# Patient Record
Sex: FEMALE | Race: WHITE | Employment: OTHER | URBAN - NONMETROPOLITAN AREA
[De-identification: names, ages, dates, MRNs, and addresses within clinical notes are randomized per-mention and may not be internally consistent; named-entity substitution may affect disease eponyms.]

---

## 2018-02-19 ENCOUNTER — HOSPITAL ENCOUNTER (OUTPATIENT)
Dept: WOMENS IMAGING | Age: 63
Discharge: HOME | End: 2018-02-19
Payer: COMMERCIAL

## 2018-02-19 DIAGNOSIS — Z13.9 VISIT FOR SCREENING: ICD-10-CM

## 2018-02-19 PROCEDURE — 77063 BREAST TOMOSYNTHESIS BI: CPT

## 2019-03-08 ENCOUNTER — HOSPITAL ENCOUNTER (OUTPATIENT)
Dept: WOMENS IMAGING | Age: 64
Discharge: HOME | End: 2019-03-08
Payer: COMMERCIAL

## 2019-03-08 DIAGNOSIS — Z13.9 VISIT FOR SCREENING: ICD-10-CM

## 2019-03-08 PROCEDURE — 77063 BREAST TOMOSYNTHESIS BI: CPT

## 2020-03-16 ENCOUNTER — HOSPITAL ENCOUNTER (OUTPATIENT)
Dept: WOMENS IMAGING | Age: 65
Discharge: HOME | End: 2020-03-16
Payer: MEDICARE

## 2020-03-16 PROCEDURE — 77063 BREAST TOMOSYNTHESIS BI: CPT

## 2020-06-19 ENCOUNTER — OFFICE VISIT (OUTPATIENT)
Dept: FAMILY MEDICINE CLINIC | Age: 65
End: 2020-06-19
Payer: MEDICARE

## 2020-06-19 VITALS
HEART RATE: 80 BPM | WEIGHT: 108 LBS | DIASTOLIC BLOOD PRESSURE: 60 MMHG | RESPIRATION RATE: 20 BRPM | HEIGHT: 64 IN | SYSTOLIC BLOOD PRESSURE: 138 MMHG | TEMPERATURE: 98.5 F | BODY MASS INDEX: 18.44 KG/M2

## 2020-06-19 PROBLEM — Z87.891 HISTORY OF SMOKING AT LEAST 1 PACK PER DAY FOR AT LEAST 30 YEARS: Status: ACTIVE | Noted: 2020-06-19

## 2020-06-19 PROBLEM — Z80.3 FAMILY HISTORY OF BREAST CANCER IN SISTER: Status: ACTIVE | Noted: 2020-06-19

## 2020-06-19 PROCEDURE — 99204 OFFICE O/P NEW MOD 45 MIN: CPT | Performed by: NURSE PRACTITIONER

## 2020-06-19 RX ORDER — MULTIVITAMIN
TABLET ORAL DAILY
COMMUNITY

## 2020-06-19 RX ORDER — BIOTIN 1 MG
TABLET ORAL DAILY
COMMUNITY

## 2020-06-19 SDOH — HEALTH STABILITY: MENTAL HEALTH: HOW MANY STANDARD DRINKS CONTAINING ALCOHOL DO YOU HAVE ON A TYPICAL DAY?: 3 OR 4

## 2020-06-19 SDOH — HEALTH STABILITY: MENTAL HEALTH: HOW OFTEN DO YOU HAVE A DRINK CONTAINING ALCOHOL?: 4 OR MORE TIMES A WEEK

## 2020-06-19 ASSESSMENT — ENCOUNTER SYMPTOMS
CONSTIPATION: 0
EYE PAIN: 0
VOMITING: 0
RHINORRHEA: 0
COUGH: 0
ABDOMINAL PAIN: 0
ALLERGIC/IMMUNOLOGIC NEGATIVE: 1
BACK PAIN: 0
EYE REDNESS: 0
NAUSEA: 0
SHORTNESS OF BREATH: 0
SORE THROAT: 0
DIARRHEA: 0
EYE DISCHARGE: 0
WHEEZING: 0
TROUBLE SWALLOWING: 0

## 2020-06-19 ASSESSMENT — PATIENT HEALTH QUESTIONNAIRE - PHQ9
2. FEELING DOWN, DEPRESSED OR HOPELESS: 0
1. LITTLE INTEREST OR PLEASURE IN DOING THINGS: 0
SUM OF ALL RESPONSES TO PHQ9 QUESTIONS 1 & 2: 0
SUM OF ALL RESPONSES TO PHQ QUESTIONS 1-9: 0
SUM OF ALL RESPONSES TO PHQ QUESTIONS 1-9: 0

## 2020-06-19 NOTE — PROGRESS NOTES
 DTaP/Tdap/Td vaccine (1 - Tdap) 03/30/1973    Lipid screen  03/30/1994    Shingles Vaccine (1 of 2) 03/30/2004    Colon cancer screen colonoscopy  03/30/2004    DEXA (modify frequency per FRAX score)  03/30/2009    Pneumococcal 65+ years Vaccine (1 of 1 - PPSV23) 03/30/2019    Annual Wellness Visit (AWV)  02/16/2020    Flu vaccine (Season Ended) 09/01/2020    Breast cancer screen  03/16/2021    Hepatitis A vaccine  Aged Out    Hepatitis B vaccine  Aged Out    Hib vaccine  Aged Out    Meningococcal (ACWY) vaccine  Aged Out       Future Appointments   Date Time Provider Yvonne Dailey   8/20/2020 10:40 AM NATALY Moe CNP [facility] [address]      Exam is fairly normal, but I did note what may be  systolic and diastolic murmurs. She states she has had a murmur since she was a child but never been evaluated. Diagnosis Orders   1. Wellness examination  CBC With Auto Differential    Basic Metabolic Panel    Lipid Panel    Hepatic Function Panel    Vitamin B12 & Folate    Hepatitis C Antibody   2. History of smoking at least 1 pack per day for at least 30 years     3. Screening for colon cancer  Cologuard (For External Results Only)   4. Family history of breast cancer in sister     11. Vaginal discharge  Culture, Genital       PLAN      1. Patient self swabbed for vaginal culture. 2.  Wellness labs ordered. 3.  Cologuard prescribed. 4.  Patient will inquire about vaccine payment in the office. 5.  Follow-up in 2 months.   Electronically signed by NATALY Moe CNP on 6/19/2020 at 12:04 PM

## 2020-06-19 NOTE — PATIENT INSTRUCTIONS
Patient Education        Stopping Smoking: Care Instructions  Your Care Instructions     Cigarette smokers crave the nicotine in cigarettes. Giving it up is much harder than simply changing a habit. Your body has to stop craving the nicotine. It is hard to quit, but you can do it. There are many tools that people use to quit smoking. You may find that combining tools works best for you. There are several steps to quitting. First you get ready to quit. Then you get support to help you. After that, you learn new skills and behaviors to become a nonsmoker. For many people, a necessary step is getting and using medicine. Your doctor will help you set up the plan that best meets your needs. You may want to attend a smoking cessation program to help you quit smoking. When you choose a program, look for one that has proven success. Ask your doctor for ideas. You will greatly increase your chances of success if you take medicine as well as get counseling or join a cessation program.  Some of the changes you feel when you first quit tobacco are uncomfortable. Your body will miss the nicotine at first, and you may feel short-tempered and grumpy. You may have trouble sleeping or concentrating. Medicine can help you deal with these symptoms. You may struggle with changing your smoking habits and rituals. The last step is the tricky one: Be prepared for the smoking urge to continue for a time. This is a lot to deal with, but keep at it. You will feel better. Follow-up care is a key part of your treatment and safety. Be sure to make and go to all appointments, and call your doctor if you are having problems. It's also a good idea to know your test results and keep a list of the medicines you take. How can you care for yourself at home? · Ask your family, friends, and coworkers for support. You have a better chance of quitting if you have help and support.   · Join a support group, such as Nicotine Anonymous, for people who

## 2020-06-22 LAB
GENITAL CULTURE, ROUTINE: NORMAL
GRAM STAIN RESULT: NORMAL

## 2020-06-23 ENCOUNTER — NURSE ONLY (OUTPATIENT)
Dept: LAB | Age: 65
End: 2020-06-23

## 2020-06-23 LAB
ALBUMIN SERPL-MCNC: 4.1 G/DL (ref 3.5–5.1)
ALP BLD-CCNC: 71 U/L (ref 38–126)
ALT SERPL-CCNC: 18 U/L (ref 11–66)
ANION GAP SERPL CALCULATED.3IONS-SCNC: 12 MEQ/L (ref 8–16)
AST SERPL-CCNC: 22 U/L (ref 5–40)
BASOPHILS # BLD: 0.8 %
BASOPHILS ABSOLUTE: 0 THOU/MM3 (ref 0–0.1)
BILIRUB SERPL-MCNC: 0.4 MG/DL (ref 0.3–1.2)
BILIRUBIN DIRECT: < 0.2 MG/DL (ref 0–0.3)
BUN BLDV-MCNC: 19 MG/DL (ref 7–22)
CALCIUM SERPL-MCNC: 9 MG/DL (ref 8.5–10.5)
CHLORIDE BLD-SCNC: 108 MEQ/L (ref 98–111)
CHOLESTEROL, TOTAL: 155 MG/DL (ref 100–199)
CO2: 23 MEQ/L (ref 23–33)
CREAT SERPL-MCNC: 0.9 MG/DL (ref 0.4–1.2)
EOSINOPHIL # BLD: 1.9 %
EOSINOPHILS ABSOLUTE: 0.1 THOU/MM3 (ref 0–0.4)
ERYTHROCYTE [DISTWIDTH] IN BLOOD BY AUTOMATED COUNT: 11.9 % (ref 11.5–14.5)
ERYTHROCYTE [DISTWIDTH] IN BLOOD BY AUTOMATED COUNT: 46.7 FL (ref 35–45)
FOLATE: > 20 NG/ML (ref 4.8–24.2)
GFR SERPL CREATININE-BSD FRML MDRD: 63 ML/MIN/1.73M2
GLUCOSE BLD-MCNC: 94 MG/DL (ref 70–108)
HCT VFR BLD CALC: 38.5 % (ref 37–47)
HDLC SERPL-MCNC: 85 MG/DL
HEMOGLOBIN: 12.4 GM/DL (ref 12–16)
HEPATITIS C ANTIBODY: NEGATIVE
IMMATURE GRANS (ABS): 0.1 THOU/MM3 (ref 0–0.07)
IMMATURE GRANULOCYTES: 1.9 %
LDL CHOLESTEROL CALCULATED: 57 MG/DL
LYMPHOCYTES # BLD: 27.6 %
LYMPHOCYTES ABSOLUTE: 1.5 THOU/MM3 (ref 1–4.8)
MCH RBC QN AUTO: 34.3 PG (ref 26–33)
MCHC RBC AUTO-ENTMCNC: 32.2 GM/DL (ref 32.2–35.5)
MCV RBC AUTO: 106.6 FL (ref 81–99)
MONOCYTES # BLD: 13 %
MONOCYTES ABSOLUTE: 0.7 THOU/MM3 (ref 0.4–1.3)
NUCLEATED RED BLOOD CELLS: 0 /100 WBC
PLATELET # BLD: 200 THOU/MM3 (ref 130–400)
PMV BLD AUTO: 11 FL (ref 9.4–12.4)
POTASSIUM SERPL-SCNC: 4.7 MEQ/L (ref 3.5–5.2)
RBC # BLD: 3.61 MILL/MM3 (ref 4.2–5.4)
SCAN OF BLOOD SMEAR: NORMAL
SEG NEUTROPHILS: 54.8 %
SEGMENTED NEUTROPHILS ABSOLUTE COUNT: 2.9 THOU/MM3 (ref 1.8–7.7)
SODIUM BLD-SCNC: 143 MEQ/L (ref 135–145)
TOTAL PROTEIN: 6.3 G/DL (ref 6.1–8)
TRIGL SERPL-MCNC: 63 MG/DL (ref 0–199)
VITAMIN B-12: 384 PG/ML (ref 211–911)
WBC # BLD: 5.3 THOU/MM3 (ref 4.8–10.8)

## 2020-06-24 ENCOUNTER — TELEPHONE (OUTPATIENT)
Dept: FAMILY MEDICINE CLINIC | Age: 65
End: 2020-06-24

## 2020-06-24 NOTE — TELEPHONE ENCOUNTER
----- Message from NATALY Winston CNP sent at 6/24/2020  8:49 AM EDT -----  Please advise patient her labs were normal, with the exception of the genital culture. It suggested the possibility of bacterial vaginosis, but the test is less reliable in postmenopausal women. I would like the patient to try an over-the-counter vaginal probiotic (lactobacillus) product that comes in either suppository or tablets. She should try this for a couple of weeks and see if she gets an improvement in her symptoms. I do not believe antibiotics are called for at this time.

## 2020-06-29 ENCOUNTER — TELEPHONE (OUTPATIENT)
Dept: FAMILY MEDICINE CLINIC | Age: 65
End: 2020-06-29

## 2020-07-02 NOTE — TELEPHONE ENCOUNTER
Pt came into office 7/2/20 for [place] for  Shingrix, but did not want to sign ABN waiver so immunization was not given. She was informed typically medicare does not cover shingrix and tetanus in the office, they will cover flu and pneumovax but states she will have all done at her pharmacy.

## 2020-07-08 ENCOUNTER — TELEPHONE (OUTPATIENT)
Dept: FAMILY MEDICINE CLINIC | Age: 65
End: 2020-07-08

## 2020-07-08 NOTE — TELEPHONE ENCOUNTER
Pt req order for Dexa Scan, she has already been given the scheduling information and will self schedule. Pt does not need re-notified of order being placed.

## 2020-07-09 NOTE — TELEPHONE ENCOUNTER
Patient said she called [facility] to schedule her Dexa Scan and they told her the office needed to send and order or schedule this. Please advise patient.

## 2020-08-09 ENCOUNTER — HOSPITAL ENCOUNTER (EMERGENCY)
Age: 65
Discharge: HOME | End: 2020-08-09
Payer: MEDICARE

## 2020-08-09 ENCOUNTER — APPOINTMENT (OUTPATIENT)
Dept: GENERAL RADIOLOGY | Age: 65
End: 2020-08-09
Payer: MEDICARE

## 2020-08-09 VITALS
RESPIRATION RATE: 16 BRPM | BODY MASS INDEX: 18.05 KG/M2 | SYSTOLIC BLOOD PRESSURE: 129 MMHG | DIASTOLIC BLOOD PRESSURE: 70 MMHG | OXYGEN SATURATION: 97 % | HEIGHT: 67 IN | TEMPERATURE: 97.8 F | HEART RATE: 79 BPM | WEIGHT: 115 LBS

## 2020-08-09 PROCEDURE — 29125 APPL SHORT ARM SPLINT STATIC: CPT

## 2020-08-09 PROCEDURE — 73110 X-RAY EXAM OF WRIST: CPT

## 2020-08-09 PROCEDURE — 99283 EMERGENCY DEPT VISIT LOW MDM: CPT

## 2020-08-09 PROCEDURE — 6370000000 HC RX 637 (ALT 250 FOR IP): Performed by: PHYSICIAN ASSISTANT

## 2020-08-09 RX ORDER — NAPROXEN 375 MG/1
375 TABLET ORAL 2 TIMES DAILY WITH MEALS
Qty: 60 TABLET | Refills: 0 | Status: SHIPPED | OUTPATIENT
Start: 2020-08-09 | End: 2020-09-21 | Stop reason: ALTCHOICE

## 2020-08-09 RX ORDER — LIDOCAINE 40 MG/G
CREAM TOPICAL PRN
Status: DISCONTINUED | OUTPATIENT
Start: 2020-08-09 | End: 2020-08-09 | Stop reason: HOSPADM

## 2020-08-09 RX ORDER — NAPROXEN 250 MG/1
375 TABLET ORAL ONCE
Status: COMPLETED | OUTPATIENT
Start: 2020-08-09 | End: 2020-08-09

## 2020-08-09 RX ADMIN — LIDOCAINE 4%: 4 CREAM TOPICAL at 12:44

## 2020-08-09 RX ADMIN — NAPROXEN 375 MG: 250 TABLET ORAL at 12:44

## 2020-08-09 ASSESSMENT — PAIN SCALES - GENERAL
PAINLEVEL_OUTOF10: 9
PAINLEVEL_OUTOF10: 9

## 2020-08-09 ASSESSMENT — PAIN DESCRIPTION - DESCRIPTORS: DESCRIPTORS: SHARP;SORE

## 2020-08-09 ASSESSMENT — PAIN DESCRIPTION - LOCATION: LOCATION: WRIST

## 2020-08-09 ASSESSMENT — PAIN DESCRIPTION - PAIN TYPE: TYPE: ACUTE PAIN

## 2020-08-09 ASSESSMENT — PAIN DESCRIPTION - ORIENTATION: ORIENTATION: LEFT

## 2020-08-09 NOTE — ED PROVIDER NOTES
[facility]  eMERGENCY dEPARTMENT eNCOUnter          CHIEF COMPLAINT       Chief Complaint   Patient presents with    Wrist Injury     Left       Nurses Notes reviewed and I agree except as noted inthe HPI. HISTORY OF PRESENT ILLNESS    Kathryne Cowden is a 76 y.o. female who presents to the Emergency Department for the evaluation of left wrist injury. Patient states that yesterday evening she was accidentally hit in the left wrist by the heavy, spring-loaded metal screen door at her home. She states she had immediate onset of pain which is been persistent since onset. It worsens most notably with supination but to a degree with all movement. She is taken Tylenol at home which did provide some improvement in the pain. She is right-hand dominant. She denies any associated numbness, tingling or weakness. She denies any anticoagulant use. No other injuries or complaints at this time. The HPI was provided by the patient. REVIEW OF SYSTEMS     Review of Systems   Constitutional: Negative for chills and fever. Musculoskeletal: Positive for arthralgias. Skin: Negative for wound. Neurological: Negative for weakness and numbness. PAST MEDICAL HISTORY    has no past medical history on file. SURGICAL HISTORY      has a past surgical history that includes Total hip arthroplasty (). CURRENT MEDICATIONS       Discharge Medication List as of 2020  1:02 PM      CONTINUE these medications which have NOT CHANGED    Details   Multiple Vitamin TABS Take by mouth dailyHistorical Med      Biotin 1000 MCG TABS Take by mouth dailyHistorical Med             ALLERGIES     has No Known Allergies. FAMILY HISTORY     She indicated that her mother is . She indicated that her father is . family history includes Cancer in her father; Kidney Disease in her mother. SOCIAL HISTORY      reports that she has been smoking cigarettes.  She started smoking about 48 years ago. She has been smoking about 1.00 pack per day. She has never used smokeless tobacco. She reports current alcohol use. She reports that she does not use drugs. PHYSICAL EXAM     INITIAL VITALS:  height is 5' 7\" (1.702 m) and weight is 115 lb (52.2 kg). Her oral temperature is 97.8 °F (36.6 °C). Her blood pressure is 129/70 and her pulse is 79. Her respiration is 16 and oxygen saturation is 97%. Physical Exam  Vitals signs and nursing note reviewed. Constitutional:       Appearance: Normal appearance. HENT:      Head: Normocephalic and atraumatic. Eyes:      Conjunctiva/sclera: Conjunctivae normal.   Neck:      Musculoskeletal: Normal range of motion. Cardiovascular:      Pulses:           Radial pulses are 2+ on the left side. Pulmonary:      Effort: Pulmonary effort is normal. No respiratory distress. Musculoskeletal:      Left wrist: She exhibits bony tenderness and swelling. She exhibits normal range of motion and no deformity. Left forearm: Normal.      Left hand: Normal.      Comments: Swelling noted primarily along the dorsum of the left wrist as well as the radial aspect. There is tenderness to the distal radius, no ulnar tenderness and no tenderness over the carpal bones. Sensation intact with full range of motion of the hand and wrist on the left   Skin:     General: Skin is warm and dry. Neurological:      General: No focal deficit present. Mental Status: She is alert and oriented to person, place, and time.    Psychiatric:         Mood and Affect: Mood normal.         Behavior: Behavior normal.         DIFFERENTIAL DIAGNOSIS:   Differential diagnoses are discussed    DIAGNOSTIC RESULTS     EKG: All EKG's are interpreted by the Emergency Department Physician who either signs or Co-signsthis chart in the absence of a cardiologist.        RADIOLOGY: non-plain film images(s) such as CT, Ultrasound and MRI are read by the radiologist.    XR WRIST LEFT (MIN 3 VIEWS) Final Result   Dorsally angulated fracture distal radius. **This report has been created using voice recognition software. It may contain minor errors which are inherent in voice recognition technology. **      Final report electronically signed by Dr. Leonidas Rutherford on 8/9/2020 12:40 PM          LABS:    Labs Reviewed - No data to display    EMERGENCY DEPARTMENT COURSE:   Vitals:    Vitals:    08/09/20 1152 08/09/20 1320   BP: 134/75 129/70   Pulse: 89 79   Resp: 17 16   Temp: 97.8 °F (36.6 °C)    TempSrc: Oral    SpO2: 99% 97%   Weight: 115 lb (52.2 kg)    Height: 5' 7\" (1.702 m)       12:51 PM EDT: The patient was seen and evaluated. Patient presents for complaints of left wrist pain after an injury that occurred yesterday. She is neurovascularly intact with good range of motion on exam though supination especially elicits pain. She is treated with Naprosyn and lidocaine cream.  X-ray confirms a dorsally angulated fracture of the distal radius. As the injury occurred yesterday, pain is well controlled and she is neurovascularly intact with good range of motion, we will defer reduction at this time. She was placed in a sugar tong splint and provided with sling. Follow-up tomorrow with the orthopedic service was discussed and she was agreeable. She will be discharged home with short course of pain control and return precautions were discussed. She denied further needs upon discharge. CRITICAL CARE:   None    CONSULTS:  None    PROCEDURES:  Patient was placed in sugar tong splint by nursing staff. She is in appropriate position and neurovascularly intact following placement. FINAL IMPRESSION      1.  Closed fracture of distal end of left radius, unspecified fracture morphology, initial encounter          DISPOSITION/PLAN   Discharge    PATIENT REFERRED TO:  [address]  [place] [address] [address] [facility] [phone]  In 1 day  Appointment scheduled for Monday, 8/10/2020 at 12:40 PM    [facility] EMERGENCY DEPT  [address] [facility]  [phone]    If symptoms worsen      DISCHARGEMEDICATIONS:  Discharge Medication List as of 8/9/2020  1:02 PM      START taking these medications    Details   naproxen (NAPROSYN) 375 MG tablet Take 1 tablet by mouth 2 times daily (with meals), Disp-60 tablet,R-0Print             (Please note that portions of this note were completedwith a voice recognition program.  Efforts were made to edit the dictations but occasionally words are mis-transcribed.)       Cara Mckenna PA-C  08/09/20 3136

## 2020-08-09 NOTE — ED NOTES
Presents to ER with complaints of left wrist pain. Pt states she was pushing a screen door open when it swung back and hit her wrist. Swelling noted to left wrist. Ice pack applied. Ratespain a 9 out of 10 in severity.       Rosanne Merritt RN  08/09/20 9623

## 2020-08-10 ENCOUNTER — HOSPITAL ENCOUNTER (OUTPATIENT)
Dept: GENERAL RADIOLOGY | Age: 65
Discharge: HOME | End: 2020-08-10
Payer: MEDICARE

## 2020-08-10 ENCOUNTER — HOSPITAL ENCOUNTER (OUTPATIENT)
Age: 65
Discharge: HOME | End: 2020-08-10
Payer: MEDICARE

## 2020-08-10 LAB
EKG ATRIAL RATE: 65 BPM
EKG P AXIS: 55 DEGREES
EKG P-R INTERVAL: 134 MS
EKG Q-T INTERVAL: 400 MS
EKG QRS DURATION: 88 MS
EKG QTC CALCULATION (BAZETT): 416 MS
EKG R AXIS: 19 DEGREES
EKG T AXIS: 58 DEGREES
EKG VENTRICULAR RATE: 65 BPM

## 2020-08-10 PROCEDURE — 93005 ELECTROCARDIOGRAM TRACING: CPT | Performed by: ORTHOPAEDIC SURGERY

## 2020-08-10 PROCEDURE — 71046 X-RAY EXAM CHEST 2 VIEWS: CPT

## 2020-08-11 ENCOUNTER — TELEPHONE (OUTPATIENT)
Dept: FAMILY MEDICINE CLINIC | Age: 65
End: 2020-08-11

## 2020-08-11 ENCOUNTER — OFFICE VISIT (OUTPATIENT)
Dept: FAMILY MEDICINE CLINIC | Age: 65
End: 2020-08-11
Payer: MEDICARE

## 2020-08-11 VITALS
HEIGHT: 65 IN | WEIGHT: 107.6 LBS | RESPIRATION RATE: 16 BRPM | BODY MASS INDEX: 17.93 KG/M2 | SYSTOLIC BLOOD PRESSURE: 132 MMHG | DIASTOLIC BLOOD PRESSURE: 66 MMHG | HEART RATE: 66 BPM | TEMPERATURE: 97.9 F

## 2020-08-11 PROCEDURE — 99214 OFFICE O/P EST MOD 30 MIN: CPT | Performed by: NURSE PRACTITIONER

## 2020-08-11 PROCEDURE — 93010 ELECTROCARDIOGRAM REPORT: CPT | Performed by: INTERNAL MEDICINE

## 2020-08-11 ASSESSMENT — ENCOUNTER SYMPTOMS
DIARRHEA: 0
ABDOMINAL PAIN: 0
CONSTIPATION: 0
EYE REDNESS: 0
TROUBLE SWALLOWING: 0
SHORTNESS OF BREATH: 0
VOMITING: 0
EYE DISCHARGE: 0
ALLERGIC/IMMUNOLOGIC NEGATIVE: 1
SORE THROAT: 0
COUGH: 0
EYE PAIN: 0
WHEEZING: 0
RHINORRHEA: 0
NAUSEA: 0
BACK PAIN: 0

## 2020-08-11 NOTE — PROGRESS NOTES
[address] [place]   [address] [facility]  [address]  Dept: [phone]  Dept Fax: [phone]    SUBJECTIVE         Pt presents for PRE-OP physical exam. Surgery is scheduled for reduction of left wrist fracture  with Dr. Cecily Kwon on 8/13. General anesthesia is planned. Current symptoms include minimal pain - takes no medication. CURRENT EXERCISE REGIMEN: none    PREVIOUS ANESTHESIA PROBLEMS? none    Current medical problems include   Patient Active Problem List   Diagnosis    History of smoking at least 1 pack per day for at least 27 years    Family history of breast cancer in sister       History reviewed. No pertinent past medical history. Past Surgical History:   Procedure Laterality Date    TOTAL HIP [facility]  2012    [facility]         No Known Allergies      Current Outpatient Medications:     naproxen (NAPROSYN) 375 MG tablet, Take 1 tablet by mouth 2 times daily (with meals), Disp: 60 tablet, Rfl: 0    Multiple Vitamin TABS, Take by mouth daily, Disp: , Rfl:     Biotin 1000 MCG TABS, Take by mouth daily, Disp: , Rfl:         Family History   Problem Relation Age of Onset    Kidney Disease Mother     Cancer Father         Bone Cancer       Social History     Tobacco Use    Smoking status: Current Every Day Smoker     Packs/day: 1.00     Types: Cigarettes     Start date: 1/1/1972    Smokeless tobacco: Never Used    Tobacco comment: down to 1/2ppd as of2019   Substance Use Topics    Alcohol use: Yes     Frequency: 4 or more times a week     Drinks per session: 3 or 4     Comment: 4 beers daily    Drug use: Never       Review of Systems   Constitutional: Negative for activity change, fatigue and fever. HENT: Negative for congestion, ear pain, rhinorrhea, sore throat and trouble swallowing. Eyes: Negative for pain, discharge and redness. Respiratory: Negative for cough, shortness of breath and wheezing. Cardiovascular: Negative.     Gastrointestinal: Negative for abdominal pain, constipation, diarrhea, nausea and vomiting. Endocrine: Negative. Genitourinary: Negative for dysuria, frequency and urgency. Musculoskeletal: Negative for arthralgias, back pain and myalgias. Skin: Negative for rash. Allergic/Immunologic: Negative. Neurological: Negative for dizziness, tremors, weakness and headaches. Hematological: Negative. Psychiatric/Behavioral: Negative for dysphoric mood and sleep disturbance. The patient is not nervous/anxious. OBJECTIVE     /66 (Site: Left Upper Arm)   Pulse 66   Temp 97.9 °F (36.6 °C) (Temporal)   Resp 16   Ht 5' 4.5\" (1.638 m)   Wt 107 lb 9.6 oz (48.8 kg)   BMI 18.18 kg/m²     Wt Readings from Last 3 Encounters:   08/11/20 107 lb 9.6 oz (48.8 kg)   08/09/20 115 lb (52.2 kg)   06/19/20 108 lb (49 kg)       Physical Exam  Constitutional:       General: She is not in acute distress. Appearance: She is well-developed. She is not diaphoretic. HENT:      Right Ear: External ear normal.      Left Ear: External ear normal.      Nose: Nose normal.   Eyes:      General:         Right eye: No discharge. Left eye: No discharge. Conjunctiva/sclera: Conjunctivae normal.      Pupils: Pupils are equal, round, and reactive to light. Neck:      Musculoskeletal: Normal range of motion. Vascular: No JVD. Cardiovascular:      Rate and Rhythm: Normal rate and regular rhythm. Pulses: Normal pulses. Heart sounds: Normal heart sounds. No murmur. Pulmonary:      Effort: Pulmonary effort is normal. No respiratory distress. Breath sounds: Normal breath sounds. No wheezing. Abdominal:      General: Abdomen is flat. Bowel sounds are normal.      Palpations: Abdomen is soft. Musculoskeletal: Normal range of motion. General: No tenderness or deformity. Skin:     General: Skin is warm and dry. Capillary Refill: Capillary refill takes less than 2 seconds.       Coloration: Skin is not pale.      Findings: No erythema or rash. Neurological:      Mental Status: She is alert and oriented to person, place, and time. Coordination: Coordination normal.   Psychiatric:         Behavior: Behavior normal.         Thought Content: Thought content normal.         Judgment: Judgment normal.           Immunization History   Administered Date(s) Administered    Pneumococcal Conjugate 13-valent ([id number]) 07/02/2020    Tdap (Boostrix, Adacel) 07/15/2020    Zoster Recombinant (Shingrix) 07/02/2020           ASSESSMENT       Diagnosis Orders   1. Pre-op examination     2. History of smoking at least 1 pack per day for at least 30 years         STOP-Bang Questionnaire  * Do you currently see a pulmonologist? No              If yes STOP, do not complete. 1.  Do you snore loudly (able to be heard in the next room)? No     2. Do you often feel tired or sleepy during the daytime? No             3.  Has anyone ever told you that you stop breathing during your sleep? No          4. Do you have or are you being treated for high blood pressure? No              5.  BMI more than 35? BMI (Calculated):   18         6. Age over 48 years? yes     7. Neck Circumference greater than 17 inches for male or 16 inches for female? No                8.  Gender Male? No                    TOTAL SCORE:   1  SARAH - Low Risk : Yes to 0 - 2 questions  SARAH - Intermediate Risk : Yes to 3 - 4 questions  SARAH - High Risk : Yes to 5 - 8 questions       PLAN      Patient exam unremarkable today. Chest x-ray and EKG reviewed with no concerning issues that prevent her surgery. Recent labs are acceptable. Preoperative clearance given.     Electronically signed by NATALY Redmond CNP on 8/11/2020 at 2:01 PM

## 2020-08-11 NOTE — TELEPHONE ENCOUNTER
Lux Cheney at [facility] spoke with Marybel Das and informed her the labs were from June. States the cbc and bmp should be fine from then. Results were faxed.

## 2020-08-11 NOTE — PROGRESS NOTES
Pre-op clearance, BMP, CBC results from June was faxed to Dr. Slim Faye at [phone]. See 8/11/20 telephone encounter. Confirmation was received.

## 2020-09-16 ENCOUNTER — HOSPITAL ENCOUNTER (OUTPATIENT)
Dept: WOMENS IMAGING | Age: 65
Discharge: HOME | End: 2020-09-16
Payer: MEDICARE

## 2020-09-16 PROCEDURE — 77080 DXA BONE DENSITY AXIAL: CPT

## 2020-09-17 ENCOUNTER — TELEPHONE (OUTPATIENT)
Dept: FAMILY MEDICINE CLINIC | Age: 65
End: 2020-09-17

## 2020-09-17 NOTE — TELEPHONE ENCOUNTER
----- Message from NATALY Brown CNP sent at 9/17/2020  7:21 AM EDT -----  Please schedule the patient for an annual wellness visit, and let her know that this DEXA scans showed she had osteoporosis. I will talk to her about those results and the results of her last checks x-ray when she comes for her visit. Also place referral for the bone fragility clinic.

## 2020-09-21 ENCOUNTER — OFFICE VISIT (OUTPATIENT)
Dept: FAMILY MEDICINE CLINIC | Age: 65
End: 2020-09-21
Payer: MEDICARE

## 2020-09-21 VITALS
RESPIRATION RATE: 16 BRPM | DIASTOLIC BLOOD PRESSURE: 52 MMHG | SYSTOLIC BLOOD PRESSURE: 130 MMHG | TEMPERATURE: 97.1 F | OXYGEN SATURATION: 99 % | WEIGHT: 108.2 LBS | HEART RATE: 77 BPM | HEIGHT: 64 IN | BODY MASS INDEX: 18.47 KG/M2

## 2020-09-21 PROCEDURE — G0402 INITIAL PREVENTIVE EXAM: HCPCS | Performed by: NURSE PRACTITIONER

## 2020-09-21 ASSESSMENT — LIFESTYLE VARIABLES
HAS A RELATIVE, FRIEND, DOCTOR, OR ANOTHER HEALTH PROFESSIONAL EXPRESSED CONCERN ABOUT YOUR DRINKING OR SUGGESTED YOU CUT DOWN: 0
HOW OFTEN DURING THE LAST YEAR HAVE YOU FOUND THAT YOU WERE NOT ABLE TO STOP DRINKING ONCE YOU HAD STARTED: 0
HOW OFTEN DURING THE LAST YEAR HAVE YOU NEEDED AN ALCOHOLIC DRINK FIRST THING IN THE MORNING TO GET YOURSELF GOING AFTER A NIGHT OF HEAVY DRINKING: 0
AUDIT TOTAL SCORE: 4
HOW OFTEN DURING THE LAST YEAR HAVE YOU FAILED TO DO WHAT WAS NORMALLY EXPECTED FROM YOU BECAUSE OF DRINKING: 0
HOW OFTEN DURING THE LAST YEAR HAVE YOU BEEN UNABLE TO REMEMBER WHAT HAPPENED THE NIGHT BEFORE BECAUSE YOU HAD BEEN DRINKING: 0
HAVE YOU OR SOMEONE ELSE BEEN INJURED AS A RESULT OF YOUR DRINKING: 0
HOW OFTEN DO YOU HAVE A DRINK CONTAINING ALCOHOL: 3
HOW MANY STANDARD DRINKS CONTAINING ALCOHOL DO YOU HAVE ON A TYPICAL DAY: 1
HOW OFTEN DURING THE LAST YEAR HAVE YOU HAD A FEELING OF GUILT OR REMORSE AFTER DRINKING: 0
AUDIT-C TOTAL SCORE: 4
HOW OFTEN DO YOU HAVE SIX OR MORE DRINKS ON ONE OCCASION: 0

## 2020-09-21 ASSESSMENT — PATIENT HEALTH QUESTIONNAIRE - PHQ9
SUM OF ALL RESPONSES TO PHQ QUESTIONS 1-9: 0
SUM OF ALL RESPONSES TO PHQ9 QUESTIONS 1 & 2: 0
SUM OF ALL RESPONSES TO PHQ QUESTIONS 1-9: 0
1. LITTLE INTEREST OR PLEASURE IN DOING THINGS: 0
2. FEELING DOWN, DEPRESSED OR HOPELESS: 0

## 2020-09-21 NOTE — PATIENT INSTRUCTIONS
Personalized Preventive Plan for Chris Holland - 9/21/2020  Medicare offers a range of preventive health benefits. Some of the tests and screenings are paid in full while other may be subject to a deductible, co-insurance, and/or copay. Some of these benefits include a comprehensive review of your medical history including lifestyle, illnesses that may run in your family, and various assessments and screenings as appropriate. After reviewing your medical record and screening and assessments performed today your provider may have ordered immunizations, labs, imaging, and/or referrals for you. A list of these orders (if applicable) as well as your Preventive Care list are included within your After Visit Summary for your review. Other Preventive Recommendations:    · A preventive eye exam performed by an eye specialist is recommended every 1-2 years to screen for glaucoma; cataracts, macular degeneration, and other eye disorders. · A preventive dental visit is recommended every 6 months. · Try to get at least 150 minutes of exercise per week or 10,000 steps per day on a pedometer . · Order or download the FREE \"Exercise & Physical Activity: Your Everyday Guide\" from [employer]. Call 4-772.457.7073 or search [employer] online. · You need 6395-1436 mg of calcium and 0390-5184 IU of vitamin D per day. It is possible to meet your calcium requirement with diet alone, but a vitamin D supplement is usually necessary to meet this goal.  · When exposed to the sun, use a sunscreen that protects against both UVA and UVB radiation with an SPF of 30 or greater. Reapply every 2 to 3 hours or after sweating, drying off with a towel, or swimming. · Always wear a seat belt when traveling in a car. Always wear a helmet when riding a bicycle or motorcycle.

## 2020-09-21 NOTE — PROGRESS NOTES
dry, no rash or erythema  Head: normocephalic and atraumatic  Eyes: pupils equal, round, and reactive to light, extraocular eye movements intact, conjunctivae normal  ENT: tympanic membrane, external ear and ear canal normal bilaterally, nose without deformity, nasal mucosa and turbinates normal without polyps  Neck: supple and non-tender without mass, no thyromegaly or thyroid nodules, no cervical lymphadenopathy  Pulmonary/Chest: clear to auscultation bilaterally- no wheezes, rales or rhonchi, normal air movement, no respiratory distress  Cardiovascular: normal rate, regular rhythm, normal S1 and S2, no murmurs, rubs, clicks, or gallops, distal pulses intact, no carotid bruits  Abdomen: soft, non-tender, non-distended, normal bowel sounds, no masses or organomegaly  Extremities: no cyanosis, clubbing or edema  Musculoskeletal: normal range of motion, no joint swelling, deformity or tenderness  Neurologic: reflexes normal and symmetric, no cranial nerve deficit, gait, coordination and speech normal    Patient's complete Health Risk Assessment and screening values have been reviewed and are found in Flowsheets. The following problems were reviewed today and where indicated follow up appointments were made and/or referrals ordered. Positive Risk Factor Screenings with Interventions:     Substance History:  Social History     Tobacco History     Smoking Status  Current Every Day Smoker Smoking Start Date  1/1/1972 Smoking Frequency  1 pack/day for 48 years (50 pk yrs) Smoking Tobacco Type  Cigarettes    Smokeless Tobacco Use  Never Used    Tobacco Comment  down to 3 cigs a day as of 2020          Alcohol History     Alcohol Use Status  Yes Comment  4 beers daily          Drug Use     Drug Use Status  Never          Sexual Activity     Sexually Active  Yes Partners  Male               Alcohol Screening: Audit-C Score: 4  Total Score: 4    A score of 8 or more is associated with harmful or hazardous drinking.  A cancer screen  03/16/2021    Pneumococcal 65+ years Vaccine (2 of 2 - PPSV23) 07/02/2021    Colon cancer screen fecal DNA test (Cologuard)  07/08/2023    Lipid screen  06/23/2025    DTaP/Tdap/Td vaccine (2 - Td) 07/15/2030    DEXA (modify frequency per FRAX score)  Completed    Shingles Vaccine  Completed    Hepatitis C screen  Completed    Hepatitis A vaccine  Aged Out    Hepatitis B vaccine  Aged Out    Hib vaccine  Aged Out    Meningococcal (ACWY) vaccine  Aged Out     Recommendations for [employer] Due: see orders and patient instructions/AVS.  . Recommended screening schedule for the next 5-10 years is provided to the patient in written form: see Patient Instructions/AVS.    Bogdan Castellanos was seen today for medicare awv and other.     Diagnoses and all orders for this visit:    Routine general medical examination at a health care facility    Other osteoporosis with current pathological fracture, initial encounter  -     Titi Gary

## 2021-03-17 ENCOUNTER — HOSPITAL ENCOUNTER (OUTPATIENT)
Dept: WOMENS IMAGING | Age: 66
Discharge: HOME | End: 2021-03-17
Payer: MEDICARE

## 2021-03-17 DIAGNOSIS — Z12.31 VISIT FOR SCREENING MAMMOGRAM: ICD-10-CM

## 2021-03-17 PROCEDURE — 77063 BREAST TOMOSYNTHESIS BI: CPT

## 2021-03-22 ENCOUNTER — TELEPHONE (OUTPATIENT)
Dept: FAMILY MEDICINE CLINIC | Age: 66
End: 2021-03-22

## 2021-03-22 NOTE — TELEPHONE ENCOUNTER
After age 72, most women who have not been diagnosed with cervical cancer or precancer can stop having Pap smears as long as they have had three negative tests within the past 10 years.

## 2021-03-22 NOTE — TELEPHONE ENCOUNTER
[facility] received a call from:    Name of Caller: Emma Null    Relationship to patient: Self    Organization name: N/A    Best contact number: [phone]    Reason for call: Pt called to see if she still needs to get pap smears at her age. She is scheduled on 4/1/21 for a physical and pap. Pt will come in for physical either way but would like a call back about pap.

## 2021-04-01 ENCOUNTER — OFFICE VISIT (OUTPATIENT)
Dept: FAMILY MEDICINE CLINIC | Age: 66
End: 2021-04-01
Payer: MEDICARE

## 2021-04-01 VITALS
TEMPERATURE: 97.7 F | SYSTOLIC BLOOD PRESSURE: 152 MMHG | DIASTOLIC BLOOD PRESSURE: 60 MMHG | BODY MASS INDEX: 18.54 KG/M2 | HEART RATE: 76 BPM | WEIGHT: 108 LBS | RESPIRATION RATE: 16 BRPM

## 2021-04-01 DIAGNOSIS — J30.2 SEASONAL ALLERGIES: Primary | ICD-10-CM

## 2021-04-01 DIAGNOSIS — Z87.891 HISTORY OF SMOKING 30 OR MORE PACK YEARS: ICD-10-CM

## 2021-04-01 DIAGNOSIS — Z87.891 STOPPED SMOKING BETWEEN 1 AND 3 MONTHS AGO: ICD-10-CM

## 2021-04-01 PROCEDURE — 99214 OFFICE O/P EST MOD 30 MIN: CPT | Performed by: NURSE PRACTITIONER

## 2021-04-01 RX ORDER — FLUTICASONE PROPIONATE 50 MCG
1 SPRAY, SUSPENSION (ML) NASAL DAILY
Qty: 2 BOTTLE | Refills: 1 | Status: SHIPPED | OUTPATIENT
Start: 2021-04-01 | End: 2022-08-22

## 2021-04-01 RX ORDER — PREDNISONE 20 MG/1
20 TABLET ORAL 2 TIMES DAILY
Qty: 10 TABLET | Refills: 0 | Status: SHIPPED | OUTPATIENT
Start: 2021-04-01 | End: 2021-04-06

## 2021-04-01 ASSESSMENT — ENCOUNTER SYMPTOMS
SHORTNESS OF BREATH: 0
ALLERGIC/IMMUNOLOGIC NEGATIVE: 1
DIARRHEA: 0
TROUBLE SWALLOWING: 0
EYE DISCHARGE: 0
SINUS PRESSURE: 1
COUGH: 0
NAUSEA: 0
EYE REDNESS: 0
BACK PAIN: 0
CONSTIPATION: 0
VOMITING: 0
SORE THROAT: 0
RHINORRHEA: 1
ABDOMINAL PAIN: 0
EYE PAIN: 0
WHEEZING: 0

## 2021-04-01 ASSESSMENT — PATIENT HEALTH QUESTIONNAIRE - PHQ9
SUM OF ALL RESPONSES TO PHQ QUESTIONS 1-9: 0
SUM OF ALL RESPONSES TO PHQ QUESTIONS 1-9: 0

## 2021-04-01 NOTE — PROGRESS NOTES
[address] [place] [address] [facility]  [address] [place] [address] [address] [address]  Dept: [phone]  Dept Fax: [phone]  Loc: Allie Rashid is a 78 y. o.female for . Patient Active Problem List   Diagnosis    Stopped smoking between 1 and 3 months ago    Family history of breast cancer in sister    History of smoking 27 or more pack years       Current Outpatient Medications   Medication Sig Dispense Refill    fluticasone (FLONASE) 50 MCG/ACT nasal spray 1 spray by Each Nostril route daily 2 Bottle 1    predniSONE (DELTASONE) 20 MG tablet Take 1 tablet by mouth 2 times daily for 5 days 10 tablet 0    Multiple Vitamin TABS Take by mouth daily      Biotin 1000 MCG TABS Take by mouth daily       No current facility-administered medications for this visit. Review of Systems   Constitutional: Negative for activity change, fatigue and fever. HENT: Positive for congestion, postnasal drip, rhinorrhea and sinus pressure. Negative for ear pain, sore throat and trouble swallowing. Eyes: Negative for pain, discharge and redness. Respiratory: Negative for cough, shortness of breath and wheezing. Cardiovascular: Negative. Gastrointestinal: Negative for abdominal pain, constipation, diarrhea, nausea and vomiting. Endocrine: Negative. Genitourinary: Negative for dysuria, frequency and urgency. Musculoskeletal: Negative for arthralgias, back pain and myalgias. Skin: Negative for rash. Allergic/Immunologic: Negative. Neurological: Negative for dizziness, tremors, weakness and headaches. Hematological: Negative. Psychiatric/Behavioral: Negative for dysphoric mood and sleep disturbance. The patient is not nervous/anxious.             OBJECTIVE     BP (!) 152/60   Pulse 76   Temp 97.7 °F (36.5 °C) (Oral)   Resp 16   Wt 108 lb (49 kg)   BMI 18.54 kg/m²     Wt Readings from Last 3 Encounters:   04/01/21 108 lb (49 kg)   09/21/20 108 lb 3.2 oz (49.1 kg)   08/11/20 107 lb 9.6 oz (48.8 kg)     Body mass index is 18.54 kg/m². Physical Exam  Constitutional:       General: She is not in acute distress. Appearance: Normal appearance. She is well-developed. She is ill-appearing. She is not diaphoretic. HENT:      Right Ear: Hearing, ear canal and external ear normal. Tympanic membrane is bulging. Left Ear: Hearing, ear canal and external ear normal. Tympanic membrane is bulging. Nose: Congestion present. No rhinorrhea. Mouth/Throat:      Dentition: Normal dentition. Pharynx: Uvula midline. Posterior oropharyngeal erythema present. Tonsils: No tonsillar exudate. 0 on the right. 0 on the left. Eyes:      General: Lids are normal.         Right eye: No discharge. Left eye: No discharge. Conjunctiva/sclera: Conjunctivae normal.      Right eye: Right conjunctiva is not injected. No chemosis. Left eye: No chemosis. Pupils: Pupils are equal, round, and reactive to light. Neck:      Musculoskeletal: Full passive range of motion without pain and normal range of motion. Vascular: No JVD. Trachea: Trachea normal.   Cardiovascular:      Rate and Rhythm: Normal rate and regular rhythm. Heart sounds: Normal heart sounds. No murmur. Pulmonary:      Effort: Pulmonary effort is normal. No respiratory distress. Breath sounds: Normal breath sounds. No stridor. No wheezing. Abdominal:      General: Bowel sounds are normal.      Palpations: Abdomen is soft. Tenderness: There is no abdominal tenderness. Musculoskeletal: Normal range of motion. General: No tenderness or deformity. Lymphadenopathy:      Cervical: No cervical adenopathy. Skin:     General: Skin is warm and dry. Capillary Refill: Capillary refill takes less than 2 seconds. Coloration: Skin is not pale. Findings: No erythema or rash.    Neurological:      Mental Status: She

## 2021-07-07 ENCOUNTER — OFFICE VISIT (OUTPATIENT)
Dept: FAMILY MEDICINE CLINIC | Age: 66
End: 2021-07-07
Payer: MEDICARE

## 2021-07-07 VITALS
WEIGHT: 103.2 LBS | RESPIRATION RATE: 16 BRPM | BODY MASS INDEX: 17.71 KG/M2 | TEMPERATURE: 98.1 F | DIASTOLIC BLOOD PRESSURE: 58 MMHG | SYSTOLIC BLOOD PRESSURE: 128 MMHG | HEART RATE: 94 BPM

## 2021-07-07 DIAGNOSIS — L23.7 ALLERGIC CONTACT DERMATITIS DUE TO PLANTS, EXCEPT FOOD: Primary | ICD-10-CM

## 2021-07-07 PROCEDURE — 99213 OFFICE O/P EST LOW 20 MIN: CPT | Performed by: FAMILY MEDICINE

## 2021-07-07 RX ORDER — MOMETASONE FUROATE 1 MG/G
CREAM TOPICAL
Qty: 45 G | Refills: 0 | Status: SHIPPED | OUTPATIENT
Start: 2021-07-07 | End: 2021-08-24 | Stop reason: ALTCHOICE

## 2021-07-07 RX ORDER — PREDNISONE 20 MG/1
40 TABLET ORAL DAILY
Qty: 14 TABLET | Refills: 0 | Status: SHIPPED | OUTPATIENT
Start: 2021-07-07 | End: 2021-07-14

## 2021-07-07 SDOH — ECONOMIC STABILITY: FOOD INSECURITY: WITHIN THE PAST 12 MONTHS, YOU WORRIED THAT YOUR FOOD WOULD RUN OUT BEFORE YOU GOT MONEY TO BUY MORE.: NEVER TRUE

## 2021-07-07 SDOH — ECONOMIC STABILITY: FOOD INSECURITY: WITHIN THE PAST 12 MONTHS, THE FOOD YOU BOUGHT JUST DIDN'T LAST AND YOU DIDN'T HAVE MONEY TO GET MORE.: NEVER TRUE

## 2021-07-07 ASSESSMENT — SOCIAL DETERMINANTS OF HEALTH (SDOH): HOW HARD IS IT FOR YOU TO PAY FOR THE VERY BASICS LIKE FOOD, HOUSING, MEDICAL CARE, AND HEATING?: NOT HARD AT ALL

## 2021-07-11 ASSESSMENT — ENCOUNTER SYMPTOMS
ABDOMINAL PAIN: 0
DIARRHEA: 0
RHINORRHEA: 0
NAUSEA: 0
EYES NEGATIVE: 1
CHEST TIGHTNESS: 0
COUGH: 0
SORE THROAT: 0
SHORTNESS OF BREATH: 0
BACK PAIN: 0
VOMITING: 0

## 2021-07-11 NOTE — PROGRESS NOTES
[address] [address] [address] [facility] [address]  Dept: [phone]  Dept Fax: [phone]  Loc: [phone]  PROGRESS NOTE      VisitDate: 2021    Amrit Harper is a 78 y.o. female who presents today for:     Chief Complaint   Patient presents with   [facility]     on arms and stomach         Subjective:  HPI  Patient comes in with rash on arms and stomach this occurred after being exposed to poison ivy. No relief with over-the-counter agents    Review of Systems   Constitutional: Negative for activity change, appetite change, fatigue and fever. HENT: Negative for congestion, rhinorrhea and sore throat. Eyes: Negative. Respiratory: Negative for cough, chest tightness and shortness of breath. Cardiovascular: Negative for chest pain and palpitations. Gastrointestinal: Negative for abdominal pain, diarrhea, nausea and vomiting. Genitourinary: Negative for dysuria and urgency. Musculoskeletal: Negative for arthralgias and back pain. Skin: Positive for rash. Neurological: Negative for dizziness and headaches. Psychiatric/Behavioral: Negative for dysphoric mood. The patient is not nervous/anxious. History reviewed. No pertinent past medical history.    Past Surgical History:   Procedure Laterality Date    TOTAL HIP ARTHROPLASTY      RTh       Family History   Problem Relation Age of Onset    Kidney Disease Mother     Cancer Father         Bone Cancer     Social History     Tobacco Use    Smoking status: Former Smoker     Packs/day: 1.00     Years: 48.00     Pack years: 48.00     Types: Cigarettes     Start date: 1972     Quit date: 2021     Years since quittin.5    Smokeless tobacco: Never Used   Substance Use Topics    Alcohol use: Yes     Comment: 4 beers daily      Current Outpatient Medications   Medication Sig Dispense Refill    mometasone (ELOCON) 0.1 % cream Apply topically 2x daily. 45 g 0    predniSONE (DELTASONE) 20 MG tablet Take 2 tablets by mouth daily for 7 days 14 tablet 0    fluticasone (FLONASE) 50 MCG/ACT nasal spray 1 spray by Each Nostril route daily 2 Bottle 1    Multiple Vitamin TABS Take by mouth daily      Biotin 1000 MCG TABS Take by mouth daily       No current facility-administered medications for this visit. No Known Allergies  Health Maintenance   Topic Date Due    COVID-19 Vaccine (1) Never done    Low dose CT lung screening  Never done    Flu vaccine (1) 09/01/2021    Annual Wellness Visit (AWV)  09/22/2021    Breast cancer screen  03/17/2022    Colon cancer screen fecal DNA test (Cologuard)  07/08/2023    Lipid screen  06/23/2025    DTaP/Tdap/Td vaccine (2 - Td or Tdap) 07/15/2030    DEXA (modify frequency per FRAX score)  Completed    Shingles Vaccine  Completed    Pneumococcal 65+ years Vaccine  Completed    Hepatitis C screen  Completed    Hepatitis A vaccine  Aged Out    Hepatitis B vaccine  Aged Out    Hib vaccine  Aged Out    Meningococcal (ACWY) vaccine  Aged Out         Objective:     Physical Exam  Constitutional:       General: She is not in acute distress. Appearance: She is well-developed. She is not diaphoretic. HENT:      Head: Normocephalic and atraumatic. Eyes:      General: No scleral icterus. Conjunctiva/sclera: Conjunctivae normal.   Neck:      Thyroid: No thyromegaly. Vascular: No JVD. Comments: No bruits  Cardiovascular:      Rate and Rhythm: Normal rate and regular rhythm. Heart sounds: Normal heart sounds. Pulmonary:      Effort: Pulmonary effort is normal. No respiratory distress. Breath sounds: Normal breath sounds. No wheezing or rales. Abdominal:      Palpations: Abdomen is soft. There is no mass. Tenderness: There is no abdominal tenderness. There is no guarding. Musculoskeletal:         General: No tenderness. Skin:     General: Skin is warm and dry.       Findings: Rash present. Neurological:      Mental Status: She is alert and oriented to person, place, and time. Cranial Nerves: No cranial nerve deficit. BP (!) 128/58   Pulse 94   Temp 98.1 °F (36.7 °C) (Oral)   Resp 16   Wt 103 lb 3.2 oz (46.8 kg)   BMI 17.71 kg/m²       Impression/Plan:  1. Allergic contact dermatitis due to plants, except food      Requested Prescriptions     Signed Prescriptions Disp Refills    mometasone (ELOCON) 0.1 % cream 45 g 0     Sig: Apply topically 2x daily.  predniSONE (DELTASONE) 20 MG tablet 14 tablet 0     Sig: Take 2 tablets by mouth daily for 7 days     No orders of the defined types were placed in this encounter. Patient giveneducational materials - see patient instructions. Discussed use, benefit, and side effects of prescribed medications. All patient questions answered. Pt voiced understanding. Reviewed health maintenance. Patient agreedwith treatment plan. Follow up as directed. **This report has been created using voice recognition software. It may contain minor errorswhich are inherent in voice recognition technology. **       Electronically signed by Graham Buckley MD on 7/11/2021 at 1:37 PM

## 2021-08-23 ENCOUNTER — NURSE TRIAGE (OUTPATIENT)
Dept: OTHER | Facility: CLINIC | Age: 66
End: 2021-08-23

## 2021-08-23 NOTE — TELEPHONE ENCOUNTER
5. PAIN: \"Is the swelling painful to touch? \" If so, ask: \"How painful is it? \"   (Scale 1-10; mild, moderate or severe)      4- 5 out of 10   Noted to lower legs     6. FEVER: \"Do you have a fever? \" If so, ask: \"What is it, how was it measured, and when did it start? \"       None    7. CAUSE: \"What do you think is causing the leg swelling? \"      Standing on concrete all day and not wearing a good pair of shoes she then got getter shoes and   8. MEDICAL HISTORY: \"Do you have a history of heart failure, kidney disease, liver failure, or cancer? \"      No     9. RECURRENT SYMPTOM: \"Have you had leg swelling before? \" If so, ask: \"When was the last time? \" \"What happened that time? \"      No     10. OTHER SYMPTOMS: \"Do you have any other symptoms? \" (e.g., chest pain, difficulty breathing)        No   11. PREGNANCY: \"Is there any chance you are pregnant? \" \"When was your last menstrual period? \"        Na    Protocols used: LEG SWELLING AND EDEMA-ADULT-OH

## 2021-08-24 ENCOUNTER — OFFICE VISIT (OUTPATIENT)
Dept: FAMILY MEDICINE CLINIC | Age: 66
End: 2021-08-24
Payer: MEDICARE

## 2021-08-24 VITALS
TEMPERATURE: 98.2 F | BODY MASS INDEX: 18.68 KG/M2 | HEART RATE: 96 BPM | DIASTOLIC BLOOD PRESSURE: 58 MMHG | SYSTOLIC BLOOD PRESSURE: 122 MMHG | WEIGHT: 108.8 LBS | RESPIRATION RATE: 16 BRPM

## 2021-08-24 DIAGNOSIS — R60.0 BILATERAL LOWER EXTREMITY EDEMA: Primary | ICD-10-CM

## 2021-08-24 DIAGNOSIS — Z87.891 HISTORY OF SMOKING 30 OR MORE PACK YEARS: ICD-10-CM

## 2021-08-24 DIAGNOSIS — R01.1 HEART MURMUR: ICD-10-CM

## 2021-08-24 PROCEDURE — 99214 OFFICE O/P EST MOD 30 MIN: CPT | Performed by: NURSE PRACTITIONER

## 2021-08-24 RX ORDER — HYDROCHLOROTHIAZIDE 25 MG/1
25 TABLET ORAL EVERY MORNING
Qty: 30 TABLET | Refills: 1 | Status: SHIPPED | OUTPATIENT
Start: 2021-08-24 | End: 2021-09-15

## 2021-08-24 NOTE — PATIENT INSTRUCTIONS
Patient Education        Leg and Ankle Edema: Care Instructions  Your Care Instructions  Swelling in the legs, ankles, and feet is called edema. It is common after you sit or stand for a while. Long plane flights or car rides often cause swelling in the legs and feet. You may also have swelling if you have to stand for long periods of time at your job. Problems with the veins in the legs (varicose veins) and changes in hormones can also cause swelling. Sometimes the swelling in the ankles and feet is caused by a more serious problem, such as heart failure, infection, blood clots, or liver or kidney disease. Follow-up care is a key part of your treatment and safety. Be sure to make and go to all appointments, and call your doctor if you are having problems. It's also a good idea to know your test results and keep a list of the medicines you take. How can you care for yourself at home? · If your doctor gave you medicine, take it as prescribed. Call your doctor if you think you are having a problem with your medicine. · Whenever you are resting, raise your legs up. Try to keep the swollen area higher than the level of your heart. · Take breaks from standing or sitting in one position. ? Walk around to increase the blood flow in your lower legs. ? Move your feet and ankles often while you stand, or tighten and relax your leg muscles. · Wear support stockings. Put them on in the morning, before swelling gets worse. · Eat a balanced diet. Lose weight if you need to. · Limit the amount of salt (sodium) in your diet. Salt holds fluid in the body and may increase swelling. When should you call for help? Call 911 anytime you think you may need emergency care. For example, call if:    · You have symptoms of a blood clot in your lung (called a pulmonary embolism). These may include:  ? Sudden chest pain. ? Trouble breathing. ? Coughing up blood.    Call your doctor now or seek immediate medical care if:    · You have signs of a blood clot, such as:  ? Pain in your calf, back of the knee, thigh, or groin. ? Redness and swelling in your leg or groin.     · You have symptoms of infection, such as:  ? Increased pain, swelling, warmth, or redness. ? Red streaks or pus. ? A fever. Watch closely for changes in your health, and be sure to contact your doctor if:    · Your swelling is getting worse.     · You have new or worsening pain in your legs.     · You do not get better as expected. Where can you learn more? Go to [email or web address] and sign in to your [employer] account. Enter X992 in the ROBAUTO box to learn more about \"Leg and Ankle Edema: Care Instructions. \"     If you do not have an account, please click on the \"Sign Up Now\" link. Current as of: October 19, 2020               Content Version: 12.9  © 2006-2021 [employer]. Care instructions adapted under license by [facility] ([facility]). If you have questions about a medical condition or this instruction, always ask your healthcare professional. [place] [address] any warranty or liability for your use of this information. Patient Education        Heart Murmur: Care Instructions  Your Care Instructions     A heart murmur is a blowing, whooshing, or rasping sound made by blood moving through the heart or the blood vessels near the heart. Murmurs can be heard through a stethoscope. Heart murmurs can occur during pregnancy or during a temporary illness, such as a fever. These murmurs usually are not a problem and go away on their own. However, sometimes a heart murmur is a sign of a serious problem, such as congenital heart disease or heart valve problems, that may need treatment. You may need more tests to check your heart. The treatment depends on the specific heart problem causing the murmur. Follow-up care is a key part of your treatment and safety.  Be sure to make and go to all appointments, and call your doctor if you are having problems. It's also a good idea to know your test results and keep a list of the medicines you take. How can you care for yourself at home? · Take your medicines exactly as prescribed. Call your doctor if you think you are having a problem with your medicine. You will get more details on the specific medicines your doctor prescribes. · Follow a heart-healthy lifestyle to help keep your heart and body healthy. ? Do not smoke. Smoking increases your risk of heart attack and stroke. If you need help quitting, talk to your doctor about stop-smoking programs and medicines. These can increase your chances of quitting for good. ? Eat heart-healthy foods such as fruits, vegetables, whole grains, fish, lean meats, and low-fat or nonfat dairy foods. Limit sodium, sugars, and alcohol. ? If your doctor recommends it, get more exercise. Walking is a good choice. Bit by bit, increase the amount you walk every day. Try for 30 minutes on most days of the week. You also may want to swim, bike, or do other activities. ? Stay at a healthy weight. Lose weight if you need to. When should you call for help? Call 911 anytime you think you may need emergency care. For example, call if:    · You have severe trouble breathing.     · You cough up pink, foamy mucus and you have trouble breathing.     · You passed out (lost consciousness).     · You have a seizure.     · You have symptoms of a stroke. These may include:  ? Sudden numbness, tingling, weakness, or loss of movement in your face, arm, or leg, especially on only one side of your body. ? Sudden vision changes. ? Sudden trouble speaking. ? Sudden confusion or trouble understanding simple statements. ? Sudden problems with walking or balance. ? A sudden, severe headache that is different from past headaches.    Call your doctor now or seek immediate medical care if:    · You have new or increased shortness of breath.     · You feel

## 2021-08-28 ASSESSMENT — ENCOUNTER SYMPTOMS
WHEEZING: 0
SHORTNESS OF BREATH: 0
ABDOMINAL PAIN: 0
CHEST TIGHTNESS: 0
BACK PAIN: 0
ABDOMINAL DISTENTION: 0
COUGH: 0

## 2021-08-28 NOTE — PROGRESS NOTES
[facility]  [address] [facility] [address]  Dept: [phone]  Dept Fax: [phone]  Loc: [phone]  PROGRESS NOTE      VisitDate: 8/24/2021    Jonathan Gore is a 78 y.o. 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 Patient reports swelling up in the knees. Reports that edema will resolve with elevation. Denies chest pain, shortness of breath, fever, dizziness, syncope, abdominal pain. .. Pack-a-day smoker past 40 years. Review of Systems   Constitutional: Negative for activity change, appetite change, chills, fatigue and fever. HENT: Negative. Eyes: Negative for visual disturbance. Respiratory: Negative for cough, chest tightness, shortness of breath and wheezing. Cardiovascular: Positive for leg swelling. Negative for chest pain and palpitations. Gastrointestinal: Negative for abdominal distention and abdominal pain. Genitourinary: Negative for dysuria. Musculoskeletal: Negative for arthralgias, back pain and neck pain. Skin: Negative. Negative for rash. Neurological: Negative for dizziness, light-headedness and headaches. Hematological: Negative for adenopathy. Psychiatric/Behavioral: Negative for decreased concentration and dysphoric mood. All other systems reviewed and are negative. History reviewed. No pertinent past medical history.    Past Surgical History:   Procedure Laterality Date    TOTAL HIP ARTHROPLASTY  2012    RTh       Family History   Problem Relation Age of Onset    Kidney Disease Mother     Cancer Father         Bone Cancer     Social History     Tobacco Use    Smoking status: Former Smoker     Packs/day: 1.00 Years: 48.00     Pack years: 48.00     Types: Cigarettes     Start date: 1972     Quit date: 2021     Years since quittin.6    Smokeless tobacco: Never Used   Substance Use Topics    Alcohol use: Yes     Comment: 4 beers daily      Current Outpatient Medications   Medication Sig Dispense Refill    Multiple Vitamins-Minerals (PRESERVISION AREDS PO) Take 2 tablets by mouth daily      hydroCHLOROthiazide (HYDRODIURIL) 25 MG tablet Take 1 tablet by mouth every morning 30 tablet 1    fluticasone (FLONASE) 50 MCG/ACT nasal spray 1 spray by Each Nostril route daily 2 Bottle 1    Multiple Vitamin TABS Take by mouth daily      Biotin 1000 MCG TABS Take by mouth daily       No current facility-administered medications for this visit. No Known Allergies  Health Maintenance   Topic Date Due    COVID-19 Vaccine (1) Never done    Low dose CT lung screening  Never done    Potassium monitoring  2021    Creatinine monitoring  2021    Annual Wellness Visit (AWV)  2021    Flu vaccine (1) 2021    Breast cancer screen  2022    Colon cancer screen fecal DNA test (Cologuard)  2023    Lipid screen  2025    DTaP/Tdap/Td vaccine (2 - Td or Tdap) 07/15/2030    DEXA (modify frequency per FRAX score)  Completed    Shingles Vaccine  Completed    Pneumococcal 65+ years Vaccine  Completed    Hepatitis C screen  Completed    Hepatitis A vaccine  Aged Out    Hepatitis B vaccine  Aged Out    Hib vaccine  Aged Out    Meningococcal (ACWY) vaccine  Aged Out         Objective:     Physical Exam  Vitals and nursing note reviewed. Constitutional:       Appearance: Normal appearance. She is well-developed and normal weight. HENT:      Head: Normocephalic. Right Ear: Tympanic membrane and ear canal normal.      Left Ear: Tympanic membrane and ear canal normal.      Nose: Nose normal.      Mouth/Throat:      Pharynx: Oropharynx is clear.    Eyes:      Extraocular Movements: Extraocular movements intact. Conjunctiva/sclera: Conjunctivae normal.   Cardiovascular:      Rate and Rhythm: Normal rate and regular rhythm. Pulses: Normal pulses. Heart sounds: Murmur heard. Systolic murmur is present with a grade of 2/6. Pulmonary:      Effort: Pulmonary effort is normal.      Breath sounds: Normal breath sounds. Abdominal:      General: Bowel sounds are normal.      Palpations: Abdomen is soft. Musculoskeletal:         General: Normal range of motion. Cervical back: Normal range of motion and neck supple. Right lower leg: No tenderness. 2+ Pitting Edema present. Left lower leg: No tenderness. 2+ Pitting Edema present. Legs:    Skin:     General: Skin is warm and dry. Neurological:      General: No focal deficit present. Mental Status: She is alert and oriented to person, place, and time. Psychiatric:         Mood and Affect: Mood normal.         Thought Content: Thought content normal.         Judgment: Judgment normal.       BP (!) 122/58 (Site: Right Upper Arm)   Pulse 96   Temp 98.2 °F (36.8 °C) (Oral)   Resp 16   Wt 108 lb 12.8 oz (49.4 kg)   BMI 18.68 kg/m²       Impression/Plan:  1. Bilateral lower extremity edema    2. Heart murmur    3.  History of smoking 30 or more pack years      Requested Prescriptions     Signed Prescriptions Disp Refills    hydroCHLOROthiazide (HYDRODIURIL) 25 MG tablet 30 tablet 1     Sig: Take 1 tablet by mouth every morning     Orders Placed This Encounter   Procedures    CBC Auto Differential     Standing Status:   Future     Standing Expiration Date:   8/24/2022    Comprehensive Metabolic Panel     Standing Status:   Future     Standing Expiration Date:   8/24/2022    T4, Free     Standing Status:   Future     Standing Expiration Date:   8/24/2022    TSH without Reflex     Standing Status:   Future     Standing Expiration Date:   8/24/2022    Magnesium     Standing Status:   Future Standing Expiration Date:   8/24/2022    EKG 12 lead     Standing Status:   Future     Standing Expiration Date:   10/23/2021     Order Specific Question:   Reason for Exam?     Answer: Other     Order Specific Question:   Reason for Exam?     Answer:   Murmur    Echocardiogram complete     Standing Status:   Future     Standing Expiration Date:   10/23/2021     Order Specific Question:   Reason for exam:     Answer:   edema, sytolic murmur       Patient giveneducational materials - see patient instructions. Discussed use, benefit, and side effects of prescribed medications. All patient questions answered. Pt voiced understanding. Reviewed health maintenance. Patient agreedwith treatment plan. Follow up as directed. Hydrochlorothiazide 25 mg 1 p.o. daily. Daily weights. Low-sodium diet. Keep logbook of weights. EkG and echo ordered. cbC, CMP, TSH, free T4 magnesium ordered. Follow-up to 3 weeks.     Electronically signed by NATALY Avendano CNP on 8/28/2021 at 11:05 AM

## 2021-09-02 ENCOUNTER — TELEPHONE (OUTPATIENT)
Dept: FAMILY MEDICINE CLINIC | Age: 66
End: 2021-09-02

## 2021-09-02 DIAGNOSIS — D64.9 ANEMIA, UNSPECIFIED TYPE: Primary | ICD-10-CM

## 2021-09-02 NOTE — TELEPHONE ENCOUNTER
----- Message from NATALY Odom CNP sent at 9/2/2021  9:28 AM EDT -----  CBC indicates significant anemia hemoglobin 9 3. Recommend check B12, iron TIBC, Hemoccult.   Follow-up next week I also ordered an EKG and echo like patient to have that done this week if possible thank you

## 2021-09-02 NOTE — TELEPHONE ENCOUNTER
Patient informed. She is scheduled for echo on 9/9 so I scheduled her to f/u here to go over labs, EKG and echo on 9/14. Orders faxed to [employer] [facility].

## 2021-09-09 ENCOUNTER — HOSPITAL ENCOUNTER (OUTPATIENT)
Age: 66
Discharge: HOME | End: 2021-09-09
Payer: MEDICARE

## 2021-09-09 ENCOUNTER — HOSPITAL ENCOUNTER (OUTPATIENT)
Dept: NON INVASIVE DIAGNOSTICS | Age: 66
Discharge: HOME | End: 2021-09-09
Payer: MEDICARE

## 2021-09-09 DIAGNOSIS — R60.0 BILATERAL LOWER EXTREMITY EDEMA: ICD-10-CM

## 2021-09-09 DIAGNOSIS — R01.1 HEART MURMUR: ICD-10-CM

## 2021-09-09 DIAGNOSIS — Z87.891 HISTORY OF SMOKING 30 OR MORE PACK YEARS: ICD-10-CM

## 2021-09-09 LAB
LV EF: 50 %
LVEF MODALITY: NORMAL

## 2021-09-09 PROCEDURE — 93306 TTE W/DOPPLER COMPLETE: CPT

## 2021-09-09 PROCEDURE — 93005 ELECTROCARDIOGRAM TRACING: CPT | Performed by: NURSE PRACTITIONER

## 2021-09-10 ENCOUNTER — TELEPHONE (OUTPATIENT)
Dept: FAMILY MEDICINE CLINIC | Age: 66
End: 2021-09-10

## 2021-09-10 LAB
EKG ATRIAL RATE: 84 BPM
EKG P AXIS: -12 DEGREES
EKG P-R INTERVAL: 132 MS
EKG Q-T INTERVAL: 364 MS
EKG QRS DURATION: 88 MS
EKG QTC CALCULATION (BAZETT): 430 MS
EKG R AXIS: 54 DEGREES
EKG T AXIS: -7 DEGREES
EKG VENTRICULAR RATE: 84 BPM

## 2021-09-10 NOTE — TELEPHONE ENCOUNTER
----- Message from NATALY Irving CNP sent at 9/10/2021  8:01 AM EDT -----  Abnormal echo moderate to severe aortic regurgitation aortic stenosis noted.   Overall left ventricular function appears normal.  Consult with cardiology

## 2021-09-14 ENCOUNTER — OFFICE VISIT (OUTPATIENT)
Dept: FAMILY MEDICINE CLINIC | Age: 66
End: 2021-09-14
Payer: MEDICARE

## 2021-09-14 VITALS
RESPIRATION RATE: 20 BRPM | TEMPERATURE: 97.9 F | WEIGHT: 106 LBS | DIASTOLIC BLOOD PRESSURE: 52 MMHG | HEIGHT: 64 IN | HEART RATE: 88 BPM | BODY MASS INDEX: 18.1 KG/M2 | SYSTOLIC BLOOD PRESSURE: 118 MMHG

## 2021-09-14 DIAGNOSIS — R60.0 BILATERAL LOWER EXTREMITY EDEMA: Primary | ICD-10-CM

## 2021-09-14 DIAGNOSIS — R01.1 HEART MURMUR: ICD-10-CM

## 2021-09-14 DIAGNOSIS — R94.31 ABNORMAL EKG: ICD-10-CM

## 2021-09-14 DIAGNOSIS — I35.1 AORTIC VALVE INSUFFICIENCY, ETIOLOGY OF CARDIAC VALVE DISEASE UNSPECIFIED: ICD-10-CM

## 2021-09-14 DIAGNOSIS — I35.0 AORTIC VALVE STENOSIS, ETIOLOGY OF CARDIAC VALVE DISEASE UNSPECIFIED: ICD-10-CM

## 2021-09-14 DIAGNOSIS — R93.1 ABNORMAL ECHOCARDIOGRAM: ICD-10-CM

## 2021-09-14 DIAGNOSIS — D50.9 IRON DEFICIENCY ANEMIA, UNSPECIFIED IRON DEFICIENCY ANEMIA TYPE: ICD-10-CM

## 2021-09-14 DIAGNOSIS — Z87.891 HISTORY OF SMOKING 30 OR MORE PACK YEARS: ICD-10-CM

## 2021-09-14 PROCEDURE — 99214 OFFICE O/P EST MOD 30 MIN: CPT | Performed by: NURSE PRACTITIONER

## 2021-09-14 RX ORDER — POTASSIUM CHLORIDE 750 MG/1
10 TABLET, EXTENDED RELEASE ORAL DAILY
Qty: 30 TABLET | Refills: 0 | Status: SHIPPED | OUTPATIENT
Start: 2021-09-14 | End: 2021-11-15 | Stop reason: ALTCHOICE

## 2021-09-14 RX ORDER — FERROUS SULFATE 325(65) MG
325 TABLET ORAL 2 TIMES DAILY
Qty: 60 TABLET | Refills: 2 | Status: SHIPPED | OUTPATIENT
Start: 2021-09-14 | End: 2021-09-15 | Stop reason: ALTCHOICE

## 2021-09-14 RX ORDER — FERROUS SULFATE 325(65) MG
325 TABLET ORAL
COMMUNITY

## 2021-09-14 RX ORDER — FUROSEMIDE 20 MG/1
20 TABLET ORAL DAILY
Qty: 30 TABLET | Refills: 0 | Status: SHIPPED | OUTPATIENT
Start: 2021-09-14 | End: 2021-10-21 | Stop reason: SDUPTHER

## 2021-09-14 NOTE — PATIENT INSTRUCTIONS
Patient Education        Iron Deficiency Anemia: Care Instructions  Your Care Instructions     Anemia means that you don't have enough red blood cells. Red blood cells carry oxygen around your body. When you have anemia, it can make you pale, weak, and tired. Many things can cause anemia. The most common cause is loss of blood. This can happen if you have heavy menstrual periods. It can also happen if you have bleeding in your stomach or bowel. You can also get anemia if you don't have enough iron in your diet or if it's hard for your body to absorb iron. In some cases, pregnancy causes anemia. That's because a pregnant woman needs more iron. Your doctor may do more tests to find the cause of your anemia. If a disease or other health problem is causing it, your doctor will treat that problem. It's important to follow up with your doctor to make sure that your iron level returns to normal.  Follow-up care is a key part of your treatment and safety. Be sure to make and go to all appointments, and call your doctor if you are having problems. It's also a good idea to know your test results and keep a list of the medicines you take. How can you care for yourself at home? · If your doctor recommended iron pills, take them as directed. ? Try to take the pills on an empty stomach. You can do this about 1 hour before or 2 hours after meals. But you may need to take iron with food to avoid an upset stomach. ? Do not take antacids or drink milk or anything with caffeine within 2 hours of when you take your iron. They can keep your body from absorbing the iron well. ? Vitamin C helps your body absorb iron. You may want to take iron pills with a glass of orange juice or some other food high in vitamin C.  ? Iron pills may cause stomach problems. These include heartburn, nausea, diarrhea, constipation, and cramps. It can help to drink plenty of fluids and include fruits, vegetables, and fiber in your diet.   ? It's normal for iron pills to make your stool a greenish or grayish black. But internal bleeding can also cause dark stool. So it's important to tell your doctor about any color changes. ? Call your doctor if you think you are having a problem with your iron pills. Even after you start to feel better, it will take several months for your body to build up its supply of iron. ? If you miss a pill, don't take a double dose. ? Keep iron pills out of the reach of small children. Too much iron can be very dangerous. · Eat foods with a lot of iron. These include red meat, shellfish, poultry, and eggs. They also include beans, raisins, whole-grain bread, and leafy green vegetables. · Steam your vegetables. This is the best way to prepare them if you want to get as much iron as possible. · Be safe with medicines. Do not take nonsteroidal anti-inflammatory pain relievers unless your doctor tells you to. These include aspirin, naproxen (Aleve), and ibuprofen (Advil, Motrin). · Liquid iron can stain your teeth. But you can mix it with water or juice and drink it with a straw. Then it won't get on your teeth. When should you call for help? Call 911 anytime you think you may need emergency care. For example, call if:    · You passed out (lost consciousness). Call your doctor now or seek immediate medical care if:    · You are short of breath.     · You are dizzy or light-headed, or you feel like you may faint.     · You have new or worse bleeding. Watch closely for changes in your health, and be sure to contact your doctor if:    · You feel weaker or more tired than usual.     · You do not get better as expected. Where can you learn more? Go to https://Sense Networksadin.[employer]. org and sign in to your [employer] account. Enter B548 in the Cognitive Security box to learn more about \"Iron Deficiency Anemia: Care Instructions. \"     If you do not have an account, please click on the \"Sign Up Now\" link.   Current as attack, and stroke are lower. · After 10 years, your risk of dying from lung cancer is cut by about half. And your risk for many other types of cancer is lower too. How would quitting help others in your life? When you quit smoking, you improve the health of everyone who now breathes in your smoke. · Their heart, lung, and cancer risks drop, much like yours. · They are sick less. For babies and small children, living smoke-free means they're less likely to have ear infections, pneumonia, and bronchitis. · If you're a woman who is or will be pregnant someday, quitting smoking means a healthier . · Children who are close to you are less likely to become adult smokers. Where can you learn more? Go to https://[employer]. org and sign in to your Neofonie account. Enter [id number] [phone] in the GTxcel box to learn more about \"Learning About Benefits From Quitting Smoking. \"     If you do not have an account, please click on the \"Sign Up Now\" link. Current as of: 2021               Content Version: 12.9  © 0419-4136 [employer]. Care instructions adapted under license by [facility] [place] [facility] [facility] If you have questions about a medical condition or this instruction, always ask your healthcare professional. [place] [address] any warranty or liability for your use of this information. Patient Education        Aortic Valve Stenosis: Care Instructions  Overview     Having aortic valve stenosis means that the valve between your heart and the large blood vessel that carries blood to the body (aorta) has narrowed. That forces the heart to pump harder to get enough blood through the valve. As stenosis gets worse, the valve gets narrower. This can cause symptoms. Symptoms include chest pain, dizziness, fainting, or shortness of breath. Your doctor will check your heart regularly. You may take medicine to lower blood pressure or cholesterol.  If the stenosis gets worse, you may choose to have the valve replaced. Follow-up care is a key part of your treatment and safety. Be sure to make and go to all appointments, and call your doctor if you are having problems. It's also a good idea to know your test results and keep a list of the medicines you take. How can you care for yourself at home? · Be safe with medicines. Take your medicines exactly as prescribed. Call your doctor if you think you are having a problem with your medicine. · Call your doctor if you have new symptoms or your symptoms get worse. · Eat heart-healthy foods. These include vegetables, fruits, nuts, beans, lean meat, fish, and whole grains. Limit sodium, alcohol, and sugar. · Be active. Ask your doctor what type and level of exercise is safe for you. · Do not smoke. · Stay at a healthy weight. Lose weight if you need to. · Manage other health problems. If you think you may have a problem with alcohol or drug use, talk to your doctor. · Avoid colds and flu. Get a pneumococcal vaccine shot if you need to. Get a flu vaccine every year. · Take care of your teeth and gums. Get regular dental checkups. Good dental health is important because bacteria can spread from infected teeth and gums to the heart valves. When should you call for help? Call 911 anytime you think you may need emergency care. For example, call if:    · You passed out (lost consciousness).     · You have symptoms of a heart attack. These may include:  ? Chest pain or pressure, or a strange feeling in the chest.  ? Sweating. ? Shortness of breath. ? Nausea or vomiting. ? Pain, pressure, or a strange feeling in the back, neck, jaw, or upper belly or in one or both shoulders or arms. ? Lightheadedness or sudden weakness. ? A fast or irregular heartbeat. After you call 911, the  may tell you to chew 1 adult-strength or 2 to 4 low-dose aspirin. Wait for an ambulance. Do not try to drive yourself.   Call your doctor now or seek immediate medical care if:    · You have new symptoms or your symptoms get worse.     · You have new or increased shortness of breath.     · You are dizzy or lightheaded, or you feel like you may faint.     · You have sudden weight gain, such as more than 2 to 3 pounds in a day or 5 pounds in a week. (Your doctor may suggest a different range of weight gain.)     · You have new or increased swelling in your legs, ankles, or feet.     · You are suddenly so tired or weak that you cannot do your usual activities. Watch closely for changes in your health, and be sure to contact your doctor if you have any problems. Where can you learn more? Go to https://[employer].Evernote. org and sign in to your Amazing Hiring account. Enter W901 in the [employer]. box to learn more about \"Aortic Valve Stenosis: Care Instructions. \"     If you do not have an account, please click on the \"Sign Up Now\" link. Current as of: August 31, 2020               Content Version: 12.9  © 5564-3934 Healthwise, Incorporated. Care instructions adapted under license by [facility] ([facility]). If you have questions about a medical condition or this instruction, always ask your healthcare professional. [address] any warranty or liability for your use of this information.

## 2021-09-15 ENCOUNTER — OFFICE VISIT (OUTPATIENT)
Dept: CARDIOLOGY CLINIC | Age: 66
End: 2021-09-15
Payer: MEDICARE

## 2021-09-15 VITALS
DIASTOLIC BLOOD PRESSURE: 54 MMHG | HEART RATE: 100 BPM | HEIGHT: 60 IN | WEIGHT: 106.4 LBS | BODY MASS INDEX: 20.89 KG/M2 | SYSTOLIC BLOOD PRESSURE: 120 MMHG

## 2021-09-15 DIAGNOSIS — I35.1 MODERATE AORTIC REGURGITATION: Primary | ICD-10-CM

## 2021-09-15 PROCEDURE — 99204 OFFICE O/P NEW MOD 45 MIN: CPT | Performed by: INTERNAL MEDICINE

## 2021-09-15 ASSESSMENT — ENCOUNTER SYMPTOMS
CHEST TIGHTNESS: 0
SHORTNESS OF BREATH: 0
BACK PAIN: 0
ABDOMINAL PAIN: 0
WHEEZING: 0
COUGH: 0
ABDOMINAL DISTENTION: 0

## 2021-09-15 NOTE — PROGRESS NOTES
[facility]  [address] [facility] [place] [address]  Dept: [phone]  Dept Fax: [phone]  Loc: [phone]  PROGRESS NOTE      VisitDate: 9/14/2021    Ashleigh Garcia is a 78 y.o. female who presents today for:     Chief Complaint   Patient presents with    Results     go over results         Subjective:  Patient here to review recent abnormal echo, EKG labs. Follow-up regarding lower extremity edema/new heart murmur. Patient does report fatigue. Denies fever. Denies chest pain, dizziness, shortness of breath, syncope, abdominal pain. History of pack-a-day smoker past 48 years stopped in January 2021. Recently placed on hydrochlorothiazide. Patient reports subtle improvement to edema lower extremities. Review of Systems   Constitutional: Positive for fatigue. Negative for activity change, appetite change, chills and fever. Eyes: Negative for visual disturbance. Respiratory: Negative for cough, chest tightness, shortness of breath and wheezing. Cardiovascular: Positive for leg swelling. Negative for chest pain and palpitations. Gastrointestinal: Negative for abdominal distention and abdominal pain. Genitourinary: Negative for dysuria. Musculoskeletal: Negative for arthralgias, back pain and neck pain. Skin: Negative. Negative for rash. Neurological: Negative for dizziness, syncope, light-headedness and headaches. Hematological: Negative for adenopathy. Psychiatric/Behavioral: Negative for decreased concentration and dysphoric mood. All other systems reviewed and are negative. History reviewed. No pertinent past medical history.    Past Surgical History:   Procedure Laterality Date    TOTAL HIP ARTHROPLASTY  2012    RTh       Family History   Problem Relation Age of Onset    Kidney Disease Mother     Cancer Father         Bone Cancer     Social History     Tobacco Use    Smoking status: Former Smoker     Packs/day: 1.00     Years: 48.00     Pack years: 48.00     Types: Cigarettes     Start date: 1972     Quit date: 2021     Years since quittin.7    Smokeless tobacco: Never Used   Substance Use Topics    Alcohol use: Yes     Comment: 4 beers daily      Current Outpatient Medications   Medication Sig Dispense Refill    ferrous sulfate (IRON 325) 325 (65 Fe) MG tablet Take 325 mg by mouth daily (with breakfast)      ferrous sulfate (IRON 325) 325 (65 Fe) MG tablet Take 1 tablet by mouth 2 times daily 60 tablet 2    furosemide (LASIX) 20 MG tablet Take 1 tablet by mouth daily 30 tablet 0    potassium chloride (KLOR-CON M) 10 MEQ extended release tablet Take 1 tablet by mouth daily 30 tablet 0    Multiple Vitamins-Minerals (PRESERVISION AREDS PO) Take 2 tablets by mouth daily      hydroCHLOROthiazide (HYDRODIURIL) 25 MG tablet Take 1 tablet by mouth every morning 30 tablet 1    fluticasone (FLONASE) 50 MCG/ACT nasal spray 1 spray by Each Nostril route daily 2 Bottle 1    Multiple Vitamin TABS Take by mouth daily      Biotin 1000 MCG TABS Take by mouth daily       No current facility-administered medications for this visit.      No Known Allergies  Health Maintenance   Topic Date Due    COVID-19 Vaccine (1) Never done    Low dose CT lung screening  Never done    Flu vaccine (1) 2021    Annual Wellness Visit (AWV)  2021    Breast cancer screen  2022    Potassium monitoring  2022    Creatinine monitoring  2022    Colon cancer screen fecal DNA test (Cologuard)  2023    Lipid screen  2025    DTaP/Tdap/Td vaccine (2 - Td or Tdap) 07/15/2030    DEXA (modify frequency per FRAX score)  Completed    Shingles Vaccine  Completed    Pneumococcal 65+ years Vaccine  Completed    Hepatitis C screen  Completed    Hepatitis A vaccine  Aged Out    Hepatitis B vaccine  Aged Out    Hib vaccine  Aged Out    Meningococcal (ACWY) vaccine  Aged Out Objective:     Physical Exam  Vitals and nursing note reviewed. Constitutional:       Appearance: Normal appearance. She is underweight. HENT:      Head: Normocephalic. Nose: Nose normal.      Mouth/Throat:      Pharynx: Oropharynx is clear. Neck:      Vascular: No carotid bruit. Cardiovascular:      Rate and Rhythm: Normal rate and regular rhythm. Pulses: Normal pulses. Heart sounds: Murmur heard. Systolic murmur is present with a grade of 3/6. Pulmonary:      Effort: Pulmonary effort is normal.      Breath sounds: Normal breath sounds. No rales. Abdominal:      General: Bowel sounds are normal. There is no distension. Palpations: Abdomen is soft. There is no mass. Musculoskeletal:      Cervical back: Normal range of motion. Right lower le+ Pitting Edema present. Left lower le+ Pitting Edema present. Legs:    Lymphadenopathy:      Cervical: No cervical adenopathy. Skin:     General: Skin is warm. Neurological:      General: No focal deficit present. Mental Status: She is alert and oriented to person, place, and time. Psychiatric:         Mood and Affect: Mood normal.         Thought Content:  Thought content normal.         Judgment: Judgment normal.       BP (!) 118/52   Pulse 88   Temp 97.9 °F (36.6 °C) (Oral)   Resp 20   Ht 5' 4.25\" (1.632 m)   Wt 106 lb (48.1 kg)   BMI 18.05 kg/m²                                Reading Date Result Priority   Husam Herrera MD  [phone] 2021       Narrative & Impression  Transthoracic Echocardiography Report (TTE)      Demographics      Patient Name    Mahin Sweeney  Gender               Female                   M      MR #            [id number]      Race                                                      Ethnicity      Account #       [de-identified]      Room Number      Accession       [id number]     Date of Study        2021   Number      Date of Birth   1955 Referring Physician  TEETEE Urbina Jose      Age             78 year(s)     [address] [facility]                                     Interpreting         Echo reader of the                                  Physician            su Goodman MD     Procedure     Type of Study      TTE procedure:ECHOCARDIOGRAM COMPLETE 2D W DOPPLER W COLOR. Procedure Date  Date: 09/09/2021 Start: 10:12 AM     Study Location: Echo Lab  Technical Quality: Adequate visualization     Indications:Lower extremity edema and Heart murmur.     Additional Medical History: Former smoker, heart murmur     Patient Status: Routine     Height: 64 inches Weight: 108 pounds BSA: 1.51 m^2 BMI: 18.54 kg/m^2     BP: 122/58 mmHg      Conclusions      Summary   Ejection fraction is visually estimated at 50%. Overall left ventricular function is normal.   Aortic valve appears tricuspid. Thickened aortic valve leaflets noted. Aortic valve leaflets are Moderately calcified. Moderate-to-severe aortic regurgitation is noted. Mild aortic stenosis is present. Signature      ----------------------------------------------------------------   Electronically signed by Janie Goodman MD (Interpreting   physician) on 09/09/2021 at 04:33 PM   ----------------------------------------------------------------      Findings      Mitral Valve   Mild mitral regurgitation is present. Aortic Valve   Aortic valve appears tricuspid. Thickened aortic valve leaflets noted. Aortic valve leaflets are Moderately calcified. Moderate-to-severe aortic regurgitation is noted. Mild aortic stenosis is present. Tricuspid Valve   Mild tricuspid regurgitation visualized. Pulmonic Valve   The pulmonic valve leaflets exhibited normal thickness, no calcification,   and normal cuspal separation.  DOPPLER: The transpulmonic velocity was   within the normal range with no evidence for regurgitation. Left Atrium   Left atrial size was normal.      Left Ventricle   Ejection fraction is visually estimated at 50%. Overall left ventricular function is normal.      Right Atrium   Right atrial size was normal.      Right Ventricle   The right ventricular size was normal with normal systolic function and   wall thickness. Pericardial Effusion   The pericardium was normal in appearance with no evidence of a pericardial   effusion. Pleural Effusion   No evidence of pleural effusion. Aorta / Great Vessels   -Aortic root dimension within normal limits.   -The Pulmonary artery is within normal limits. -IVC size is within normal limits with normal respiratory phasic changes.      M-Mode/2D Measurements & Calculations      LV Diastolic   LV Systolic Dimension:    AV Cusp Separation: 1.4 cmLA   Dimension: 5   3.6 cm                    Dimension: 3.2 cmAO Root   cm             LV Volume Diastolic: 546  Dimension: 3.7 cmLA Area: 18.2   LV FS:28 %     ml                        cm^2   LV PW          LV Volume Systolic: 53.2   Diastolic: 0.9 ml   cm             LV EDV/LV EDV Index: 118   Septum         ml/78 m^2LV ESV/LV ESV    RV Diastolic Dimension: 2.4 cm   Diastolic: 0.7 Index: 42.5 ml/36 m^2   cm             EF Calculated: 53.9 %     LA/Aorta: 0.86                                               LA volume/Index: 52.5 ml /35m^2                     LVOT: 2.1 cm     Doppler Measurements & Calculations      MV Peak E-Wave:     AV Peak Velocity: 183   LVOT Peak Velocity: 131 cm/s   93.8 cm/s           cm/s                    LVOT Mean Velocity: 95.6 cm/s   MV Peak A-Wave: 59  AV Peak Gradient: 13.4  LVOT Peak Gradient: 7 mmHgLVOT   cm/s                mmHg                    Mean Gradient: 4 mmHg   MV E/A Ratio: 1.59  AV Mean Velocity: 135   MV Peak Gradient:   cm/s                    TV Peak E-Wave: 74.6 cm/s   3.52 mmHg           AV Mean Gradient: 8     TV Peak A-Wave: 42.4 cm/s mmHg   MV Deceleration     AV VTI: 42 cm           TV Peak Gradient: 2.23 mmHg   Time: 201 msec      AV Area                 TR Velocity:271 cm/s   MV P1/2t: 59 msec   (Continuity):2.09 cm^2  TR Gradient:29.38 mmHg   MVA by PHT:3.73                             PV Peak Velocity: 55.7 cm/s   cm^2                LVOT VTI: 25.4 cm       PV Peak Gradient: 1.24 mmHg                       AV P1/2t: 255 msec   MV E' Septal   Velocity: 11.9 cm/s   MV A' Septal   Velocity: 15.8 cm/s AV DVI (VTI): 0.6AV DVI   MV E' Lateral       (Vmax):0.72   Velocity: 12.1 cm/s   MV A' Lateral   Velocity: 7 cm/s   E/E' septal: 7.88   E/E' lateral: 7.75   MR Velocity: 404   cm/s     [email or web address] [id number]        Specimen Collected: 09/09/21 10:12 Last Resulted: 09/09/21 16:33       Order Details     View Encounter     Lab and Collection Details     Routing     Result History           Result Care Coordination    Result Notes   NATALY Abraham CNP   9/10/2021  8:01 AM EDT Back to Top      Abnormal echo moderate to severe aortic regurgitation aortic stenosis noted.  Overall left ventricular function appears normal.  Consult with cardiology          Follow-up Encounters     9/10/2021 Telephone Back to Top            All Reviewers List    NATALY Isabel CNP on 9/10/2021 08:01   Echocardiogram complete: Result Notes   NATALY Abraham CNP   9/10/2021  8:01 AM EDT       Abnormal echo moderate to severe aortic regurgitation aortic stenosis noted.  Overall left ventricular function appears normal.  Consult with cardiology          Routing History    Priority Sent On From To Message Type    9/10/2021  8:01 AM Devendra Risser, APRN - CNP [facility] Clinical Support Result Notes    9/9/2021  4:33 PM Jason, Lynnetta Party Incoming Cardiovascular Orders To Joaquín Norton [address], APRN - CNP Results   PACS Images     Show images for Echocardiogram complete   Order Report    Order Details      Impression/Plan:  1. Bilateral lower extremity edema    2. Heart murmur    3. History of smoking 30 or more pack years    4. Iron deficiency anemia, unspecified iron deficiency anemia type    5. Abnormal echocardiogram    6. Abnormal EKG    7. Aortic valve insufficiency, etiology of cardiac valve disease unspecified    8. Aortic valve stenosis, etiology of cardiac valve disease unspecified      Requested Prescriptions     Signed Prescriptions Disp Refills    ferrous sulfate (IRON 325) 325 (65 Fe) MG tablet 60 tablet 2     Sig: Take 1 tablet by mouth 2 times daily    furosemide (LASIX) 20 MG tablet 30 tablet 0     Sig: Take 1 tablet by mouth daily    potassium chloride (KLOR-CON M) 10 MEQ extended release tablet 30 tablet 0     Sig: Take 1 tablet by mouth daily     Orders Placed This Encounter   Procedures   Lucía Shook MD, Hematology & Oncology, [facility]     Referral Priority:   Routine     Referral Type:   Eval and Treat     Referral Reason:   Specialty Services Required     Referred to Provider:   Kelley Peace MD     Requested Specialty:   Hematology and Oncology     Number of Visits Requested:   Tapan Oviedo MD, Cardiology, [facility]     Referral Priority:   Routine     Referral Type:   Eval and Treat     Referral Reason:   Specialty Services Required     Referred to Provider:   Eleuterio Madrigal MD     Requested Specialty:   Cardiology     Number of Visits Requested:   1       Patient giveneducational materials - see patient instructions. Discussed use, benefit, and side effects of prescribed medications. All patient questions answered. Pt voiced understanding. Reviewed health maintenance. Patient agreedwith treatment plan. Follow up as directed. Hold hydrochlorothiazide. Consult with hematology regarding anemia status. Consult cardiology regarding abnormal EKG and echo. Lasix 20 mg 1 p.o. daily. Potassium tablet 10 mEq 1 p.o. daily. Ferrous sulfate 3 2 5 mg 1 p.o. twice daily. Off work until consultations. Follow-up me 2 weeks.  30 min  Electronically signed by NATALY Hager CNP on 9/15/2021 at 7:55 AM

## 2021-10-01 ENCOUNTER — HOSPITAL ENCOUNTER (OUTPATIENT)
Dept: NURSING | Age: 66
Discharge: HOME | End: 2021-10-01
Payer: MEDICARE

## 2021-10-01 ENCOUNTER — TELEPHONE (OUTPATIENT)
Dept: CARDIOLOGY CLINIC | Age: 66
End: 2021-10-01

## 2021-10-01 VITALS
TEMPERATURE: 98.3 F | OXYGEN SATURATION: 97 % | DIASTOLIC BLOOD PRESSURE: 54 MMHG | RESPIRATION RATE: 16 BRPM | SYSTOLIC BLOOD PRESSURE: 114 MMHG | HEART RATE: 84 BPM

## 2021-10-01 DIAGNOSIS — R93.1 ABNORMAL ECHOCARDIOGRAM: Primary | ICD-10-CM

## 2021-10-01 DIAGNOSIS — I35.1 MODERATE AORTIC REGURGITATION: ICD-10-CM

## 2021-10-01 LAB
LV EF: 38 %
LVEF MODALITY: NORMAL

## 2021-10-01 PROCEDURE — 93312 ECHO TRANSESOPHAGEAL: CPT

## 2021-10-01 PROCEDURE — 6370000000 HC RX 637 (ALT 250 FOR IP)

## 2021-10-01 PROCEDURE — 2580000003 HC RX 258: Performed by: NURSE PRACTITIONER

## 2021-10-01 PROCEDURE — 99152 MOD SED SAME PHYS/QHP 5/>YRS: CPT

## 2021-10-01 PROCEDURE — 93325 DOPPLER ECHO COLOR FLOW MAPG: CPT

## 2021-10-01 PROCEDURE — 93320 DOPPLER ECHO COMPLETE: CPT

## 2021-10-01 PROCEDURE — 6360000002 HC RX W HCPCS: Performed by: INTERNAL MEDICINE

## 2021-10-01 RX ORDER — FLUMAZENIL 0.1 MG/ML
0.2 INJECTION, SOLUTION INTRAVENOUS PRN
Status: DISCONTINUED | OUTPATIENT
Start: 2021-10-01 | End: 2021-10-02 | Stop reason: HOSPADM

## 2021-10-01 RX ORDER — SODIUM CHLORIDE 9 MG/ML
INJECTION, SOLUTION INTRAVENOUS CONTINUOUS
Status: DISCONTINUED | OUTPATIENT
Start: 2021-10-01 | End: 2021-10-02 | Stop reason: HOSPADM

## 2021-10-01 RX ORDER — NALOXONE HYDROCHLORIDE 0.4 MG/ML
0.4 INJECTION, SOLUTION INTRAMUSCULAR; INTRAVENOUS; SUBCUTANEOUS PRN
Status: DISCONTINUED | OUTPATIENT
Start: 2021-10-01 | End: 2021-10-02 | Stop reason: HOSPADM

## 2021-10-01 RX ORDER — FENTANYL CITRATE 50 UG/ML
25 INJECTION, SOLUTION INTRAMUSCULAR; INTRAVENOUS EVERY 5 MIN PRN
Status: DISCONTINUED | OUTPATIENT
Start: 2021-10-01 | End: 2021-10-02 | Stop reason: HOSPADM

## 2021-10-01 RX ORDER — SODIUM CHLORIDE 9 MG/ML
25 INJECTION, SOLUTION INTRAVENOUS PRN
Status: DISCONTINUED | OUTPATIENT
Start: 2021-10-01 | End: 2021-10-02 | Stop reason: HOSPADM

## 2021-10-01 RX ORDER — SODIUM CHLORIDE 0.9 % (FLUSH) 0.9 %
5-40 SYRINGE (ML) INJECTION PRN
Status: DISCONTINUED | OUTPATIENT
Start: 2021-10-01 | End: 2021-10-02 | Stop reason: HOSPADM

## 2021-10-01 RX ORDER — SODIUM CHLORIDE 0.9 % (FLUSH) 0.9 %
5-40 SYRINGE (ML) INJECTION EVERY 12 HOURS SCHEDULED
Status: DISCONTINUED | OUTPATIENT
Start: 2021-10-01 | End: 2021-10-02 | Stop reason: HOSPADM

## 2021-10-01 RX ORDER — MIDAZOLAM HYDROCHLORIDE 1 MG/ML
1 INJECTION INTRAMUSCULAR; INTRAVENOUS EVERY 5 MIN PRN
Status: DISCONTINUED | OUTPATIENT
Start: 2021-10-01 | End: 2021-10-02 | Stop reason: HOSPADM

## 2021-10-01 RX ADMIN — MIDAZOLAM 1 MG: 1 INJECTION INTRAMUSCULAR; INTRAVENOUS at 09:49

## 2021-10-01 RX ADMIN — SODIUM CHLORIDE: 9 INJECTION, SOLUTION INTRAVENOUS at 07:32

## 2021-10-01 RX ADMIN — FENTANYL CITRATE 50 MCG: 0.05 INJECTION, SOLUTION INTRAMUSCULAR; INTRAVENOUS at 09:49

## 2021-10-01 RX ADMIN — MIDAZOLAM 1 MG: 1 INJECTION INTRAMUSCULAR; INTRAVENOUS at 09:48

## 2021-10-01 RX ADMIN — FENTANYL CITRATE 50 MCG: 0.05 INJECTION, SOLUTION INTRAMUSCULAR; INTRAVENOUS at 09:47

## 2021-10-01 ASSESSMENT — PAIN SCALES - GENERAL
PAINLEVEL_OUTOF10: 0

## 2021-10-01 ASSESSMENT — PAIN - FUNCTIONAL ASSESSMENT: PAIN_FUNCTIONAL_ASSESSMENT: 0-10

## 2021-10-01 NOTE — TELEPHONE ENCOUNTER
Per Dr. Vicenta LIN he would like to have pt scheduled for [facility] during his interventional week. Giving to scheduling.      Agree to Vo

## 2021-10-01 NOTE — PROGRESS NOTES
9040 Patient arrived to [facility] ambulatory for DEREK. Oriented to room and call light  PT RIGHTS AND RESPONSIBILITIES OFFERED TO PT. She states she has a  and she has been [facility] Dr. Camarillo Creed arrived  0945 Time out completed  [phone] Scope in  0959Scope out. Procedure completed and she tolerated well. Dr Camarillo Creed out to speak with family. jhonny as charted. She denies pain. 1015 jhonny as charted. She denies pain. Fiance at bedside    1030 vitals as charted. She denies pain    1045 vitals as charted. She denies pain    1100 vitals as charted. She denies pain. Iv removed. Discharge instructions given and explained and she denies questions.   Discharged in wheel chair      _M___ Safety:       (Environmental)  [facility] environment   Ensure ID band is correct and in place/ allergy band as needed   Assess for fall risk   Initiate fall precautions as applicable (fall band, side rails, etc.)   Call light within reach   Bed in low position/ wheels locked    _M___ Pain:        Assess pain level and characteristics   Administer analgesics as ordered   Assess effectiveness of pain management and report to MD as needed    __M__ Knowledge Deficit:   Assess baseline knowledge   Provide teaching at level of understanding   Provide teaching via preferred learning method   Evaluate teaching effectiveness    _M___ Hemodynamic/Respiratory Status:       (Pre and Post Procedure Monitoring)   Assess/Monitor vital signs and LOC   Assess Baseline SpO2 prior to any sedation   Obtain weight/height   Assess vital signs/ LOC until patient meets discharge criteria   Monitor procedure site and notify MD of any issues

## 2021-10-04 ENCOUNTER — TELEPHONE (OUTPATIENT)
Dept: CARDIOLOGY CLINIC | Age: 66
End: 2021-10-04

## 2021-10-04 DIAGNOSIS — I35.1 MODERATE AORTIC REGURGITATION: Primary | ICD-10-CM

## 2021-10-04 NOTE — TELEPHONE ENCOUNTER
Pt returned call  Date, time and instructions reviewed and mailed to pt  Pt seemed confused on why she was having heart cath, she states she was told she needed to see a surgeon for her valve. Informed CV surgeon appt is not scheduled yet. Reviewed upcoming appts and date of heart cath.   Pt understood

## 2021-10-04 NOTE — TELEPHONE ENCOUNTER
Heart cath scheduled 10-20-21 @ 1:00pm    Left msg for pt to call office for date, time and instructions

## 2021-10-04 NOTE — TELEPHONE ENCOUNTER
PROCEDURE: CARDIAC CATH     DATE OF SERVICE: 10/20/2021. SERVICE LOCATION: [facility]. CPT CODE: 38213. PHYSICIAN: Dr. Siobhan Alejandra. DATE PRIOR AUTH SUBMITTED: 10/04/2021. STATUS: APPROVED. AUTH NUMBER: [id number]. VALID: 10/04/2021 - 12/02/2021.

## 2021-10-05 ENCOUNTER — OFFICE VISIT (OUTPATIENT)
Dept: FAMILY MEDICINE CLINIC | Age: 66
End: 2021-10-05
Payer: MEDICARE

## 2021-10-05 VITALS
WEIGHT: 105 LBS | HEIGHT: 64 IN | DIASTOLIC BLOOD PRESSURE: 50 MMHG | BODY MASS INDEX: 17.93 KG/M2 | HEART RATE: 86 BPM | RESPIRATION RATE: 16 BRPM | SYSTOLIC BLOOD PRESSURE: 128 MMHG | TEMPERATURE: 97.6 F

## 2021-10-05 DIAGNOSIS — Z00.00 ROUTINE GENERAL MEDICAL EXAMINATION AT A HEALTH CARE FACILITY: Primary | ICD-10-CM

## 2021-10-05 DIAGNOSIS — Z87.891 PERSONAL HISTORY OF TOBACCO USE: ICD-10-CM

## 2021-10-05 PROCEDURE — G0008 ADMIN INFLUENZA VIRUS VAC: HCPCS | Performed by: NURSE PRACTITIONER

## 2021-10-05 PROCEDURE — G0438 PPPS, INITIAL VISIT: HCPCS | Performed by: NURSE PRACTITIONER

## 2021-10-05 PROCEDURE — 90694 VACC AIIV4 NO PRSRV 0.5ML IM: CPT | Performed by: NURSE PRACTITIONER

## 2021-10-05 RX ORDER — CALCIUM CARBONATE 500(1250)
500 TABLET ORAL DAILY
COMMUNITY

## 2021-10-05 ASSESSMENT — PATIENT HEALTH QUESTIONNAIRE - PHQ9
2. FEELING DOWN, DEPRESSED OR HOPELESS: 0
SUM OF ALL RESPONSES TO PHQ QUESTIONS 1-9: 0
SUM OF ALL RESPONSES TO PHQ9 QUESTIONS 1 & 2: 0
1. LITTLE INTEREST OR PLEASURE IN DOING THINGS: 0
SUM OF ALL RESPONSES TO PHQ QUESTIONS 1-9: 0
SUM OF ALL RESPONSES TO PHQ QUESTIONS 1-9: 0

## 2021-10-05 ASSESSMENT — LIFESTYLE VARIABLES
HOW MANY STANDARD DRINKS CONTAINING ALCOHOL DO YOU HAVE ON A TYPICAL DAY: 1
AUDIT TOTAL SCORE: 5
HOW OFTEN DURING THE LAST YEAR HAVE YOU BEEN UNABLE TO REMEMBER WHAT HAPPENED THE NIGHT BEFORE BECAUSE YOU HAD BEEN DRINKING: 0
HOW OFTEN DURING THE LAST YEAR HAVE YOU FOUND THAT YOU WERE NOT ABLE TO STOP DRINKING ONCE YOU HAD STARTED: 0
HOW OFTEN DURING THE LAST YEAR HAVE YOU FAILED TO DO WHAT WAS NORMALLY EXPECTED FROM YOU BECAUSE OF DRINKING: 0
HOW OFTEN DO YOU HAVE A DRINK CONTAINING ALCOHOL: 4
HOW OFTEN DO YOU HAVE SIX OR MORE DRINKS ON ONE OCCASION: 0
HOW OFTEN DURING THE LAST YEAR HAVE YOU NEEDED AN ALCOHOLIC DRINK FIRST THING IN THE MORNING TO GET YOURSELF GOING AFTER A NIGHT OF HEAVY DRINKING: 0
HOW OFTEN DURING THE LAST YEAR HAVE YOU HAD A FEELING OF GUILT OR REMORSE AFTER DRINKING: 0
HAS A RELATIVE, FRIEND, DOCTOR, OR ANOTHER HEALTH PROFESSIONAL EXPRESSED CONCERN ABOUT YOUR DRINKING OR SUGGESTED YOU CUT DOWN: 0
AUDIT-C TOTAL SCORE: 5
HAVE YOU OR SOMEONE ELSE BEEN INJURED AS A RESULT OF YOUR DRINKING: 0

## 2021-10-05 NOTE — PROGRESS NOTES
Medicare Annual Wellness Visit  Name: Aixa Silverio Date: 10/5/2021   MRN: [id number] Sex: Female   Age: 78 y.o. Ethnicity: Non- / Non    : 1955 Race: White (non-)      Bhumika Espinosa is here for Medicare AWV and Flu Vaccine    Screenings for behavioral, psychosocial and functional/safety risks, and cognitive dysfunction are all negative except as indicated below. These results, as well as other patient data from the [address] form, are documented in Flowsheets linked to this Encounter. No Known Allergies      Prior to Visit Medications    Medication Sig Taking? Authorizing Provider   calcium carbonate (OSCAL) 500 MG TABS tablet Take 500 mg by mouth daily Yes Historical Provider, MD   ferrous sulfate (IRON 325) 325 (65 Fe) MG tablet Take 325 mg by mouth daily (with breakfast) Yes Historical Provider, MD   furosemide (LASIX) 20 MG tablet Take 1 tablet by mouth daily Yes NATALY Membreno CNP   potassium chloride (KLOR-CON M) 10 MEQ extended release tablet Take 1 tablet by mouth daily Yes NATALY Membreno CNP   Multiple Vitamins-Minerals (PRESERVISION AREDS PO) Take 2 tablets by mouth daily Yes Historical Provider, MD   fluticasone (FLONASE) 50 MCG/ACT nasal spray 1 spray by Each Nostril route daily Yes NATALY Hernandez CNP   Multiple Vitamin TABS Take by mouth daily Yes Historical Provider, MD   Biotin 1000 MCG TABS Take by mouth daily Yes Historical Provider, MD       History reviewed. No pertinent past medical history.     Past Surgical History:   Procedure Laterality Date    TOTAL HIP ARTHROPLASTY      RT           Family History   Problem Relation Age of Onset    Kidney Disease Mother     Cancer Father         Bone Cancer       CareTeam (Including outside providers/suppliers regularly involved in providing care):   Patient Care Team:  NATALY Hernandez CNP as PCP - General (Nurse Practitioner Family)  Kristin Perry APRN - CNP as PCP - [address] [place] Provider    Wt Readings from Last 3 Encounters:   10/05/21 105 lb (47.6 kg)   09/15/21 106 lb 6.4 oz (48.3 kg)   09/14/21 106 lb (48.1 kg)     Vitals:    10/05/21 0934   BP: (!) 128/50   Pulse: 86   Resp: 16   Temp: 97.6 °F (36.4 °C)   TempSrc: Oral   Weight: 105 lb (47.6 kg)   Height: 5' 4\" (1.626 m)     Body mass index is 18.02 kg/m². Based upon direct observation of the patient, evaluation of cognition reveals recent and remote memory intact. General Appearance: alert and oriented to person, place and time, well developed and well- nourished, in no acute distress  Skin: warm and dry, no rash or erythema  Head: normocephalic and atraumatic  Eyes: pupils equal, round, and reactive to light, extraocular eye movements intact, conjunctivae normal  ENT: tympanic membrane, external ear and ear canal normal bilaterally, nose without deformity, nasal mucosa and turbinates normal without polyps  Neck: supple and non-tender without mass, no thyromegaly or thyroid nodules, no cervical lymphadenopathy  Pulmonary/Chest: clear to auscultation bilaterally- no wheezes, rales or rhonchi, normal air movement, no respiratory distress  Cardiovascular: normal rate, regular rhythm, normal S1 and S2, murmur noted, no rubs, clicks, or gallops, distal pulses intact, no carotid bruits  Abdomen: soft, non-tender, non-distended, normal bowel sounds, no masses or organomegaly  Extremities: no cyanosis, clubbing or edema  Musculoskeletal: normal range of motion, no joint swelling, deformity or tenderness  Neurologic: reflexes normal and symmetric, no cranial nerve deficit, gait, coordination and speech normal    Patient's complete Health Risk Assessment and screening values have been reviewed and are found in Flowsheets. The following problems were reviewed today and where indicated follow up appointments were made and/or referrals ordered.     Positive Risk Factor Screenings with Interventions: General Health and ACP:  General  In general, how would you say your health is?: Good  In the past 7 days, have you experienced any of the following?  New or Increased Pain, New or Increased Fatigue, Loneliness, Social Isolation, Stress or Anger?: None of These  Do you get the social and emotional support that you need?: Yes  Do you have a Living Will?: (!) No  Advance Directives     Power of [facility] Will ACP-Advance Directive ACP-Power of     Not on File Not on File Not on File Not on File      General Health Risk Interventions:  · recommended will assistance    Health Habits/Nutrition:  Health Habits/Nutrition  Do you exercise for at least 20 minutes 2-3 times per week?: Yes  Have you lost any weight without trying in the past 3 months?: (!) Yes  Do you eat only one meal per day?: No  Have you seen the dentist within the past year?: Yes  Body mass index: (!) 18.02  Health Habits/Nutrition Interventions:  · Declines     Safety:  Safety  Do you have working smoke detectors?: Yes  Have all throw rugs been removed or fastened?: (!) No  Do you have non-slip mats or surfaces in all bathtubs/showers?: Yes  Do all of your stairways have a railing or banister?: Yes  Are your doorways, halls and stairs free of clutter?: Yes  Do you always fasten your seatbelt when you are in a car?: Yes  Safety Interventions:  · Patient declines any further evaluation/treatment for this issue     Personalized Preventive Plan   Current Health Maintenance Status  Immunization History   Administered Date(s) Administered    Influenza, High Dose (Fluzone 65 yrs and older) 11/13/2020    Pneumococcal Conjugate 13-valent ([id number]) 07/02/2020    Pneumococcal Polysaccharide ([id number]) 07/06/2021    Tdap (Boostrix, Adacel) 07/15/2020    Zoster Recombinant (Shingrix) 07/02/2020, 09/16/2020        Health Maintenance   Topic Date Due    COVID-19 Vaccine (1) Never done    Low dose CT lung screening  Never done   [employer] Wellness Visit (AWV)  Never done    Flu vaccine (1) 09/01/2021    Breast cancer screen  03/17/2022    Potassium monitoring  08/31/2022    Creatinine monitoring  08/31/2022    Colon cancer screen fecal DNA test (Cologuard)  07/08/2023    Lipid screen  06/23/2025    DTaP/Tdap/Td vaccine (2 - Td or Tdap) 07/15/2030    DEXA (modify frequency per FRAX score)  Completed    Shingles Vaccine  Completed    Pneumococcal 65+ years Vaccine  Completed    Hepatitis C screen  Completed    Hepatitis A vaccine  Aged Out    Hepatitis B vaccine  Aged Out    Hib vaccine  Aged Out    Meningococcal (ACWY) vaccine  Aged Out     Recommendations for [employer] Due: see orders and patient instructions/AVS.  . Recommended screening schedule for the next 5-10 years is provided to the patient in written form: see Patient Instructions/AVS.    Ester Martin was seen today for medicare awv and flu vaccine. Diagnoses and all orders for this visit:    Routine general medical examination at a health care facility    Personal history of tobacco use  -     CT chest diagnostic low dose;  Future    Other orders  -     INFLUENZA, QUADV, ADJUVANTED, 65 YRS =, IM, PF, PREFILL SYR, 0.5ML (FLUAD)

## 2021-10-05 NOTE — PATIENT INSTRUCTIONS
Personalized Preventive Plan for Elisha Umaña - 10/5/2021  Medicare offers a range of preventive health benefits. Some of the tests and screenings are paid in full while other may be subject to a deductible, co-insurance, and/or copay. Some of these benefits include a comprehensive review of your medical history including lifestyle, illnesses that may run in your family, and various assessments and screenings as appropriate. After reviewing your medical record and screening and assessments performed today your provider may have ordered immunizations, labs, imaging, and/or referrals for you. A list of these orders (if applicable) as well as your Preventive Care list are included within your After Visit Summary for your review. Other Preventive Recommendations:    · A preventive eye exam performed by an eye specialist is recommended every 1-2 years to screen for glaucoma; cataracts, macular degeneration, and other eye disorders. · A preventive dental visit is recommended every 6 months. · Try to get at least 150 minutes of exercise per week or 10,000 steps per day on a pedometer . · Order or download the FREE \"Exercise & Physical Activity: Your Everyday Guide\" from [employer]. Call 2-816.968.1779 or search [employer] online. · You need 0134-3984 mg of calcium and 3035-9294 IU of vitamin D per day. It is possible to meet your calcium requirement with diet alone, but a vitamin D supplement is usually necessary to meet this goal.  · When exposed to the sun, use a sunscreen that protects against both UVA and UVB radiation with an SPF of 30 or greater. Reapply every 2 to 3 hours or after sweating, drying off with a towel, or swimming. · Always wear a seat belt when traveling in a car. Always wear a helmet when riding a bicycle or motorcycle.

## 2021-10-11 ENCOUNTER — HOSPITAL ENCOUNTER (OUTPATIENT)
Dept: INFUSION THERAPY | Age: 66
Discharge: HOME | End: 2021-10-11
Payer: MEDICARE

## 2021-10-11 ENCOUNTER — OFFICE VISIT (OUTPATIENT)
Dept: ONCOLOGY | Age: 66
End: 2021-10-11
Payer: MEDICARE

## 2021-10-11 VITALS
BODY MASS INDEX: 16.4 KG/M2 | DIASTOLIC BLOOD PRESSURE: 53 MMHG | HEIGHT: 67 IN | SYSTOLIC BLOOD PRESSURE: 113 MMHG | HEART RATE: 100 BPM | OXYGEN SATURATION: 97 % | RESPIRATION RATE: 16 BRPM | TEMPERATURE: 97.7 F | WEIGHT: 104.5 LBS

## 2021-10-11 DIAGNOSIS — D64.9 NORMOCYTIC ANEMIA: Primary | ICD-10-CM

## 2021-10-11 DIAGNOSIS — D64.9 NORMOCYTIC ANEMIA: ICD-10-CM

## 2021-10-11 LAB
ATYPICAL LYMPHOCYTES: ABNORMAL %
BASOPHILS # BLD: 0.5 %
BASOPHILS ABSOLUTE: 0 THOU/MM3 (ref 0–0.1)
EOSINOPHIL # BLD: 1 %
EOSINOPHILS ABSOLUTE: 0 THOU/MM3 (ref 0–0.4)
ERYTHROCYTE [DISTWIDTH] IN BLOOD BY AUTOMATED COUNT: 15.9 % (ref 11.5–14.5)
ERYTHROCYTE [DISTWIDTH] IN BLOOD BY AUTOMATED COUNT: 60.3 FL (ref 35–45)
FERRITIN: 124 NG/ML (ref 10–291)
FOLATE: 18.9 NG/ML (ref 4.8–24.2)
HCT VFR BLD CALC: 35.5 % (ref 37–47)
HEMOGLOBIN: 10.7 GM/DL (ref 12–16)
IMMATURE GRANS (ABS): 0.01 THOU/MM3 (ref 0–0.07)
IMMATURE GRANULOCYTES: 0.3 %
IRON: 85 UG/DL (ref 50–170)
LYMPHOCYTES # BLD: 35.6 %
LYMPHOCYTES ABSOLUTE: 1.4 THOU/MM3 (ref 1–4.8)
MCH RBC QN AUTO: 30.7 PG (ref 26–33)
MCHC RBC AUTO-ENTMCNC: 30.1 GM/DL (ref 32.2–35.5)
MCV RBC AUTO: 102 FL (ref 81–99)
MONOCYTES # BLD: 29.6 %
MONOCYTES ABSOLUTE: 1.1 THOU/MM3 (ref 0.4–1.3)
NUCLEATED RED BLOOD CELLS: 0 /100 WBC
PLATELET # BLD: 137 THOU/MM3 (ref 130–400)
PMV BLD AUTO: 11.4 FL (ref 9.4–12.4)
RBC # BLD: 3.48 MILL/MM3 (ref 4.2–5.4)
SCAN OF BLOOD SMEAR: NORMAL
SEG NEUTROPHILS: 33 %
SEGMENTED NEUTROPHILS ABSOLUTE COUNT: 1.3 THOU/MM3 (ref 1.8–7.7)
TOTAL IRON BINDING CAPACITY: 281 UG/DL (ref 171–450)
VITAMIN B-12: 591 PG/ML (ref 211–911)
WBC # BLD: 3.8 THOU/MM3 (ref 4.8–10.8)

## 2021-10-11 PROCEDURE — 85025 COMPLETE CBC W/AUTO DIFF WBC: CPT

## 2021-10-11 PROCEDURE — 82746 ASSAY OF FOLIC ACID SERUM: CPT

## 2021-10-11 PROCEDURE — 83540 ASSAY OF IRON: CPT

## 2021-10-11 PROCEDURE — 36415 COLL VENOUS BLD VENIPUNCTURE: CPT

## 2021-10-11 PROCEDURE — 99211 OFF/OP EST MAY X REQ PHY/QHP: CPT

## 2021-10-11 PROCEDURE — 82607 VITAMIN B-12: CPT

## 2021-10-11 PROCEDURE — 83550 IRON BINDING TEST: CPT

## 2021-10-11 PROCEDURE — 82728 ASSAY OF FERRITIN: CPT

## 2021-10-11 PROCEDURE — 99204 OFFICE O/P NEW MOD 45 MIN: CPT | Performed by: PHYSICIAN ASSISTANT

## 2021-10-11 NOTE — PATIENT INSTRUCTIONS
1. Will obtain CBC, ferritin, iron, TIBC, folate, vitamin B12 today  2. Will call with results to determine further work-up/plan.      Update 10/18/21:  Return to clinic in 4 weeks  Labs on RTC

## 2021-10-11 NOTE — PROGRESS NOTES
[facility], [address] [address]  [address] [address]  Dept: [phone]  Dept Fax: [phone]: [phone]      Visit Date:10/11/2021     Maryellen Torres is a 78 y.o. female who presents today for:   Chief Complaint   Patient presents with    New Patient     Iron deficiency anemia        HPI:   Maryellen Torres is a 78 y.o. female referred to Hematology/Oncology clinic for evaluation of iron deficiency anemia per her PCP GABRIELLE Blas. The patient had lab work completed on 8/31/2021 with CBC showing Hgb 9.3, Hct 29.1, MCV 95. WBC count 4.7, platelet count 873. She had iron studies on 9/2/21 showing iron 26, iron sat 14%, TIBC 183. Vitamin B12 within normal limits. She was recommended over-the-counter iron supplementation daily and referred for further evaluation. The patient denies any abnormal bleeding. Denies epistaxis, hemoptysis, hematemesis, melena, hematochezia, hematuria or vaginal bleeding. She denies prior history of EGD or colonoscopy. Patient states she has Cologuard testing annually with last 1 year ago which was negative. The patient denies history of malabsorptive disorder such as IBD or celiac disease. She denies prior history of gastrointestinal surgeries. The patient affirms adequate intake of iron in her diet. She has been taking oral iron supplementation 2 times daily for the last 4 weeks. She denies recent increase in fatigue. She affirms intermittent lightheadedness, dizziness, chest pain, shortness of breath, and palpitations. The patient attributes this to known aortic regurgitation and is following with Cardiology. Her PMH includes aortic regurgitation. The patient is a current smoker of 1 ppd and drinks 3 beers nightly.       Past Medical History:   Diagnosis Date    Anemia     Valvular heart disease 09/2021      Past Surgical History:   Procedure Laterality Date    HIP SURGERY  2012    TOTAL HIP ARTHROPLASTY  2012    [address]Th       Family History   Problem Relation Age of Onset    Kidney Disease Mother     Cancer Father         Bone Cancer      Social History     Tobacco Use    Smoking status: Former Smoker     Packs/day: 1.00     Years: 48.00     Pack years: 48.00     Types: Cigarettes     Start date: 1972     Quit date: 2021     Years since quittin.7    Smokeless tobacco: Never Used   Substance Use Topics    Alcohol use: Yes     Comment: 4 beers daily      Current Outpatient Medications   Medication Sig Dispense Refill    calcium carbonate (OSCAL) 500 MG TABS tablet Take 500 mg by mouth daily      ferrous sulfate (IRON 325) 325 (65 Fe) MG tablet Take 325 mg by mouth 2 times daily       furosemide (LASIX) 20 MG tablet Take 1 tablet by mouth daily 30 tablet 0    potassium chloride (KLOR-CON M) 10 MEQ extended release tablet Take 1 tablet by mouth daily 30 tablet 0    Multiple Vitamins-Minerals (PRESERVISION AREDS PO) Take 2 tablets by mouth daily      fluticasone (FLONASE) 50 MCG/ACT nasal spray 1 spray by Each Nostril route daily 2 Bottle 1    Multiple Vitamin TABS Take by mouth daily      Biotin 1000 MCG TABS Take by mouth daily       No current facility-administered medications for this visit. No Known Allergies       Review of Systems:   Review of Systems   Pertinent review of systems noted in HPI, all other ROS negative. Objective:   Physical Exam   BP (!) 113/53 (Site: Right Upper Arm, Position: Sitting, Cuff Size: Medium Adult)   Pulse 100   Temp 97.7 °F (36.5 °C) (Oral)   Resp 16   Ht 5' 7\" (1.702 m)   Wt 104 lb 8 oz (47.4 kg)   SpO2 97%   BMI 16.37 kg/m²    General appearance: No apparent distress, chronically ill appearing, thin, and cooperative. HEENT: Pupils equal, round, and reactive to light. Conjunctivae/corneas clear. Neck: Supple, with full range of motion. Trachea midline. Respiratory:  Normal respiratory effort.  Clear to auscultation, bilaterally without

## 2021-10-15 ENCOUNTER — HOSPITAL ENCOUNTER (OUTPATIENT)
Dept: CT IMAGING | Age: 66
Discharge: HOME | End: 2021-10-15
Payer: MEDICARE

## 2021-10-15 ENCOUNTER — TELEPHONE (OUTPATIENT)
Dept: ONCOLOGY | Age: 66
End: 2021-10-15

## 2021-10-15 DIAGNOSIS — Z00.00 ROUTINE GENERAL MEDICAL EXAMINATION AT A HEALTH CARE FACILITY: ICD-10-CM

## 2021-10-15 DIAGNOSIS — Z87.891 PERSONAL HISTORY OF TOBACCO USE: ICD-10-CM

## 2021-10-15 PROCEDURE — 71271 CT THORAX LUNG CANCER SCR C-: CPT

## 2021-10-18 ENCOUNTER — TELEPHONE (OUTPATIENT)
Dept: ONCOLOGY | Age: 66
End: 2021-10-18

## 2021-10-18 ENCOUNTER — TELEPHONE (OUTPATIENT)
Dept: FAMILY MEDICINE CLINIC | Age: 66
End: 2021-10-18

## 2021-10-18 NOTE — TELEPHONE ENCOUNTER
----- Message from NATALY Rob CNP sent at 10/18/2021  7:59 AM EDT -----  COPD and other chronic findings noted. Nothing concerning or urgent. Repeat in 1 year.

## 2021-10-19 ENCOUNTER — PREP FOR PROCEDURE (OUTPATIENT)
Dept: CARDIOLOGY | Age: 66
End: 2021-10-19

## 2021-10-19 DIAGNOSIS — Z01.818 PRE-OP TESTING: Primary | ICD-10-CM

## 2021-10-19 RX ORDER — ASPIRIN 325 MG
325 TABLET ORAL ONCE
Status: CANCELLED | OUTPATIENT
Start: 2021-10-19 | End: 2021-10-19

## 2021-10-19 RX ORDER — NITROGLYCERIN 0.4 MG/1
0.4 TABLET SUBLINGUAL EVERY 5 MIN PRN
Status: CANCELLED | OUTPATIENT
Start: 2021-10-19

## 2021-10-19 RX ORDER — SODIUM CHLORIDE 0.9 % (FLUSH) 0.9 %
5-40 SYRINGE (ML) INJECTION EVERY 12 HOURS SCHEDULED
Status: CANCELLED | OUTPATIENT
Start: 2021-10-19

## 2021-10-19 RX ORDER — SODIUM CHLORIDE 9 MG/ML
INJECTION, SOLUTION INTRAVENOUS CONTINUOUS
Status: CANCELLED | OUTPATIENT
Start: 2021-10-19

## 2021-10-19 RX ORDER — SODIUM CHLORIDE 9 MG/ML
25 INJECTION, SOLUTION INTRAVENOUS PRN
Status: CANCELLED | OUTPATIENT
Start: 2021-10-19

## 2021-10-19 RX ORDER — SODIUM CHLORIDE 0.9 % (FLUSH) 0.9 %
5-40 SYRINGE (ML) INJECTION PRN
Status: CANCELLED | OUTPATIENT
Start: 2021-10-19

## 2021-10-20 NOTE — TELEPHONE ENCOUNTER
Pt said her swelling has improved since taking the lasix. Cardio thought she should've continued it but didn't know if it was a short term to protect her kidneys due to her upcoming surgery.

## 2021-10-20 NOTE — TELEPHONE ENCOUNTER
----- Message from Jessica Braxton sent at 10/20/2021  3:27 PM EDT -----  Subject: Refill Request    QUESTIONS  Name of Medication? potassium chloride (KLOR-CON M) 10 MEQ extended   release tablet  Patient-reported dosage and instructions? once daily  How many days do you have left? 0  Preferred Pharmacy? [facility] phone number (if available)? [phone]  ---------------------------------------------------------------------------  --------------,  Name of Medication? furosemide (LASIX) 20 MG tablet  Patient-reported dosage and instructions? once daily  How many days do you have left? 0  Preferred Pharmacy? [facility] phone number (if available)? [phone]  Additional Information for Provider? Pt was advised by the nurse at her   cardiac appt that she should still be taking these meds. ---------------------------------------------------------------------------  --------------  Omelia Counter INFO  What is the best way for the office to contact you? OK to leave message on   voicemail  Preferred Call Back Phone Number?  [phone]

## 2021-10-21 RX ORDER — FUROSEMIDE 20 MG/1
20 TABLET ORAL DAILY
Qty: 30 TABLET | Refills: 0 | Status: ON HOLD | OUTPATIENT
Start: 2021-10-21 | End: 2021-11-23 | Stop reason: HOSPADM

## 2021-10-21 NOTE — TELEPHONE ENCOUNTER
Patient was placed on Lasix due to her bilateral leg swelling. If she is not having this problem, she does not need to take Lasix anymore. If she is it is okay to continue it until her surgery which should help resolve it.

## 2021-10-24 NOTE — H&P
[address] [address]  Sedation/Analgesia History & Physical    Pt Name: Damaris Hi  Account number: [de-identified]  MRN: [id number]  YOB: 1955  Provider Performing Procedure: Florencio Ayers MD MD  Referring Provider: No att. providers found   Primary Care Physician: NATALY Robert - TEETEE  Date: 10/24/2021    PRE-PROCEDURE    Code Status: FULL CODE  Brief History/Pre-Procedure Diagnosis:   AI    Consent: : I have discussed with the patient risks, benefits, and alternatives (and relevant risks, benefits, and side effects related to alternatives or not receiving care), and likelihood of the success. The patient and/or representative appear to understand and agree to proceed. The discussion encompasses risks, benefits, and side effects related to the alternatives and the risks related to not receiving the proposed care, treatment, and services. The indication, risks and benefits of the procedure and possible therapeutic consequences and alternatives were discussed with the patient. The patient was given the opportunity to ask questions and to have them answered to his/her satisfaction. Risks of the procedure include but are not limited to the following: Bleeding, hematoma including retroperitoneal hemmorhage, infection, pain and discomfort, injury to the aorta and other blood vessels, rhythm disturbance, low blood pressure, myocardial infarction, stroke, kidney damage/failure, myocardial perforation, allergic reactions to sedatives/contrast material, loss of pulse/vascular compromise requiring surgery, aneurysm/pseudoaneurysm formation, possible loss of a limb/hand/leg, needing blood transfusion, requiring emergent open heart surgery or emergent coronary intervention, the need for intubation/respiratory support, the requirement for defibrillation/cardioversion, and death. Alternatives to and omission of the suggested procedure were discussed.  The patient had no further questions and wished to proceed; the consent form was signed. MEDICAL HISTORY  [x]ASHD/ANGINA/MI/CHF   []Hypertension  []Diabetes  []Hyperlipidemia  []Smoking  []Family Hx of ASHD  [x]Additional information:       has a past medical history of Anemia and Valvular heart disease. SURGICAL HISTORY   has a past surgical history that includes Total hip arthroplasty (2012) and hip surgery (2012). Additional information:       ALLERGIES   Allergies as of 10/01/2021    (No Known Allergies)     Additional information:       MEDICATIONS   Aspirin  [x] 81 mg  [] 325 mg  [] None  Antiplatelet drug therapy use last 5 days  [x]No []Yes  Coumadin Use Last 5 Days [x]No []Yes  Other anticoagulant use last 5 days  [x]No []Yes    Current Outpatient Medications:     ferrous sulfate (IRON 325) 325 (65 Fe) MG tablet, Take 325 mg by mouth 2 times daily , Disp: , Rfl:     potassium chloride (KLOR-CON M) 10 MEQ extended release tablet, Take 1 tablet by mouth daily, Disp: 30 tablet, Rfl: 0    Multiple Vitamins-Minerals (PRESERVISION AREDS PO), Take 2 tablets by mouth daily, Disp: , Rfl:     fluticasone (FLONASE) 50 MCG/ACT nasal spray, 1 spray by Each Nostril route daily, Disp: 2 Bottle, Rfl: 1    Multiple Vitamin TABS, Take by mouth daily, Disp: , Rfl:     Biotin 1000 MCG TABS, Take by mouth daily, Disp: , Rfl:     furosemide (LASIX) 20 MG tablet, Take 1 tablet by mouth daily, Disp: 30 tablet, Rfl: 0    calcium carbonate (OSCAL) 500 MG TABS tablet, Take 500 mg by mouth daily, Disp: , Rfl:   Prior to Admission medications    Medication Sig Start Date End Date Taking?  Authorizing Provider   ferrous sulfate (IRON 325) 325 (65 Fe) MG tablet Take 325 mg by mouth 2 times daily    Yes Historical Provider, MD   potassium chloride (KLOR-CON M) 10 MEQ extended release tablet Take 1 tablet by mouth daily 9/14/21  Yes Jose Urbina APRN - CNP   Multiple Vitamins-Minerals (PRESERVISION AREDS PO) Take 2 tablets by mouth daily   Yes Historical Provider, MD   fluticasone [facility]) 50 MCG/ACT nasal spray 1 spray by Each Nostril route daily 4/1/21  Yes Judd Him, APRN - CNP   Multiple Vitamin TABS Take by mouth daily   Yes Historical Provider, MD   Biotin 1000 MCG TABS Take by mouth daily   Yes Historical Provider, MD   furosemide (LASIX) 20 MG tablet Take 1 tablet by mouth daily 10/21/21   Judd Garner, APRN - CNP   calcium carbonate (OSCAL) 500 MG TABS tablet Take 500 mg by mouth daily    Historical Provider, MD     Additional information:       VITAL SIGNS   Vitals:    10/01/21 1100   BP: (!) 114/54   Pulse: 84   Resp: 16   Temp:    SpO2: 97%       PHYSICAL:   General: No acute distress  HEENT:  Unremarkable for age  Neck: without increased JVD, carotid pulses 2+ bilaterally without bruits  Heart: RRR, S1 & S2 WNL, S4 gallop, without murmurs or rubs   NYHA: 2  Lungs: Clear to auscultation    Abdomen: BS present, without HSM, masses, or tenderness    Extremities: without C,C,E.  Pulses 2+ bilaterally  Mental Status: Alert & Oriented        PLANNED PROCEDURE   []Cath  []PCI                []Pacemaker/AICD  [x]DEREK             []Cardioversion []Peripheral angiography/PTA  []Other:      SEDATION  Planned agent:[x]Midazolam []Meperidine [x]Sublimaze []Morphine  []Diazepam  []Other:     ASA Classification:  []1 []2 [x]3 []4 []5  Class 1: A normal healthy patient  Class 2: Pt with mild to moderate systemic disease  Class 3: Severe systemic disease or disturbance  Class 4: Severe systemic disorders that are already life threatening. Class 5: Moribund pt with little chances of survival, for more than 24 hours. Mallampati I Airway Classification:   []1 [x]2 []3 []4    [x]Pre-procedure diagnostic studies complete and results available. Comment:    [x]Previous sedation/anesthesia experiences assessed. Comment:    [x]The patient is an appropriate candidate to undergo the planned procedure sedation and anesthesia.  (Refer to nursing sedation/analgesia documentation record)  [x]Formulation and discussion of sedation/procedure plan, risks, and expectations with patient and/or responsible adult completed. [x]Patient examined immediately prior to the procedure.  (Refer to nursing sedation/analgesia documentation record)    Prakash John MD MD   Electronically signed 10/24/2021 at 11:08 AM

## 2021-10-26 ENCOUNTER — TELEPHONE (OUTPATIENT)
Dept: CARDIOTHORACIC SURGERY | Age: 66
End: 2021-10-26

## 2021-10-26 ENCOUNTER — OFFICE VISIT (OUTPATIENT)
Dept: CARDIOTHORACIC SURGERY | Age: 66
End: 2021-10-26
Payer: MEDICARE

## 2021-10-26 VITALS
WEIGHT: 106 LBS | DIASTOLIC BLOOD PRESSURE: 45 MMHG | HEART RATE: 86 BPM | SYSTOLIC BLOOD PRESSURE: 100 MMHG | HEIGHT: 64 IN | BODY MASS INDEX: 18.1 KG/M2

## 2021-10-26 DIAGNOSIS — Z09 ENCOUNTER FOR FOLLOW-UP EXAMINATION AFTER COMPLETED TREATMENT FOR CONDITIONS OTHER THAN MALIGNANT NEOPLASM: ICD-10-CM

## 2021-10-26 DIAGNOSIS — Z01.818 PRE-OP EXAMINATION: Primary | ICD-10-CM

## 2021-10-26 DIAGNOSIS — I35.1 NONRHEUMATIC AORTIC VALVE INSUFFICIENCY: ICD-10-CM

## 2021-10-26 PROCEDURE — 99205 OFFICE O/P NEW HI 60 MIN: CPT | Performed by: THORACIC SURGERY (CARDIOTHORACIC VASCULAR SURGERY)

## 2021-10-26 ASSESSMENT — ENCOUNTER SYMPTOMS
ABDOMINAL PAIN: 0
EYE DISCHARGE: 0
BACK PAIN: 0
COLOR CHANGE: 0
SHORTNESS OF BREATH: 1

## 2021-10-26 NOTE — PROGRESS NOTES
[address] [facility] SURGERY  [address]  Dept: [phone]  Dept Fax: [phone]: [phone]    Visit Date: 10/26/2021    Ms. Ki Waddell is a 78 y. o.female  who presented for:  Chief Complaint   Patient presents with    New Patient     Moderate aortic regurgitation       HPI:   78year old female, no previous cardiovascular history, presents with recent diagnosis of aortic insufficiency prompted by new onset of peripheral edema. Patient additionally reports mild dyspnea on exertion; denies chest pain, orthopnea or presyncope. Current Outpatient Medications:     furosemide (LASIX) 20 MG tablet, Take 1 tablet by mouth daily, Disp: 30 tablet, Rfl: 0    calcium carbonate (OSCAL) 500 MG TABS tablet, Take 500 mg by mouth daily, Disp: , Rfl:     ferrous sulfate (IRON 325) 325 (65 Fe) MG tablet, Take 325 mg by mouth 2 times daily , Disp: , Rfl:     potassium chloride (KLOR-CON M) 10 MEQ extended release tablet, Take 1 tablet by mouth daily, Disp: 30 tablet, Rfl: 0    Multiple Vitamins-Minerals (PRESERVISION AREDS PO), Take 2 tablets by mouth daily, Disp: , Rfl:     fluticasone (FLONASE) 50 MCG/ACT nasal spray, 1 spray by Each Nostril route daily, Disp: 2 Bottle, Rfl: 1    Multiple Vitamin TABS, Take by mouth daily, Disp: , Rfl:     Biotin 1000 MCG TABS, Take by mouth daily, Disp: , Rfl:     No Known Allergies    Past Medical History  Hui Rivera  has a past medical history of Anemia and Valvular heart disease. Social History  Hui Rivera  reports that she quit smoking about 9 months ago. Her smoking use included cigarettes. She started smoking about 49 years ago. She has a 48.00 pack-year smoking history. She has never used smokeless tobacco. She reports current alcohol use. She reports that she does not use drugs.     Family History  Hui Rivera family history includes Cancer in her father; Kidney Disease in her mother. There is no family history of bicuspid aortic valve, aneurysms, heart transplant, pacemakers, defibrillators, or sudden cardiac death. Past Surgical History   Past Surgical History:   Procedure Laterality Date    HIP SURGERY  2012    TOTAL HIP ARTHROPLASTY  2012    [facility]         Subjective:     Review of Systems   Constitutional: Positive for activity change and fatigue. HENT: Negative for congestion. Eyes: Negative for discharge. Respiratory: Positive for shortness of breath. Cardiovascular: Positive for leg swelling. Negative for chest pain and palpitations. Gastrointestinal: Negative for abdominal pain. Endocrine: Negative for cold intolerance. Genitourinary: Negative for difficulty urinating. Musculoskeletal: Negative for back pain. Skin: Negative for color change. Allergic/Immunologic: Negative for environmental allergies. Neurological: Negative for dizziness. Hematological: Negative for adenopathy. Psychiatric/Behavioral: Negative for agitation. Objective:     BP (!) 100/45 (Site: Left Upper Arm, Position: Sitting, Cuff Size: Medium Adult)   Pulse 86   Ht 5' 4\" (1.626 m)   Wt 106 lb (48.1 kg)   BMI 18.19 kg/m²     Wt Readings from Last 3 Encounters:   10/26/21 106 lb (48.1 kg)   10/20/21 104 lb (47.2 kg)   10/11/21 104 lb 8 oz (47.4 kg)     BP Readings from Last 3 Encounters:   10/26/21 (!) 100/45   10/20/21 (!) 103/51   10/11/21 (!) 113/53       Physical Exam  Constitutional:       Appearance: Normal appearance. HENT:      Head: Normocephalic and atraumatic. Right Ear: Tympanic membrane, ear canal and external ear normal.      Left Ear: Tympanic membrane, ear canal and external ear normal.      Nose: Nose normal.      Mouth/Throat:      Mouth: Mucous membranes are dry. Eyes:      Extraocular Movements: Extraocular movements intact.       Conjunctiva/sclera: Conjunctivae normal.      Pupils: Pupils are equal, round, and reactive to light. Neck:      Vascular: No carotid bruit. Cardiovascular:      Rate and Rhythm: Normal rate and regular rhythm. Heart sounds: Murmur heard. Pulmonary:      Effort: Pulmonary effort is normal.      Breath sounds: Normal breath sounds. Abdominal:      General: Abdomen is flat. Palpations: Abdomen is soft. Musculoskeletal:         General: Swelling present. Normal range of motion. Cervical back: Normal range of motion and neck supple. Skin:     General: Skin is warm and dry. Capillary Refill: Capillary refill takes 2 to 3 seconds. Neurological:      General: No focal deficit present. Mental Status: She is alert and oriented to person, place, and time.    Psychiatric:         Mood and Affect: Mood normal.         Lab Results   Component Value Date    WBC 2.6 10/20/2021    RBC 3.22 10/20/2021    HGB 10.3 10/20/2021    HCT 32.7 10/20/2021    .6 10/20/2021    MCH 32.0 10/20/2021    MCHC 31.5 10/20/2021    RDW 12.5 06/13/2012     10/20/2021    MPV 11.2 10/20/2021       Lab Results   Component Value Date     10/20/2021    K 4.1 10/20/2021     10/20/2021    CO2 26 10/20/2021    BUN 21 10/20/2021    LABALBU 4.1 06/23/2020    CREATININE 0.7 10/20/2021    CALCIUM 9.0 10/20/2021    LABGLOM 83 10/20/2021    GLUCOSE 98 10/20/2021       Lab Results   Component Value Date    ALKPHOS 71 06/23/2020    ALT 18 06/23/2020    AST 22 06/23/2020    PROT 6.3 06/23/2020    BILITOT 0.4 06/23/2020    BILIDIR <0.2 06/23/2020    LABALBU 4.1 06/23/2020       Lab Results   Component Value Date    MG 2.5 06/11/2012       Lab Results   Component Value Date    INR 1.07 10/20/2021    INR 0.97 06/11/2012    INR 0.96 06/11/2012         Lab Results   Component Value Date    LABA1C 5.3 06/11/2012       Lab Results   Component Value Date    TRIG 30 10/20/2021    HDL 61 10/20/2021    LDLCALC 40 10/20/2021       No results found for: TSH      Testing Reviewed:      I have individually reviewed the images of the following tests:    CT chest: no calcification of ascending aorta; mild aneurysm of ascending aorta    Echocardiogram:Results for orders placed during the hospital encounter of 09/09/21    Echocardiogram complete    Narrative  Transthoracic Echocardiography Report (TTE)    Demographics    Patient Name    Reginaldo Reynolds  Gender               Female  RADHA    MR #            [id number]      Race                     Ethnicity    Account #       [de-identified]      Room Number    Accession       [id number]     Date of Study        09/09/2021  Number    Date of Birth   1955     Referring Physician  Fredi Urbina W Eliza Summers    Age             78 year(s)     [address] [facility], [facility]    Interpreting         Echo reader of the  Physician            week  Delano Barbosa MD    Procedure    Type of Study    TTE procedure:ECHOCARDIOGRAM COMPLETE 2D W DOPPLER W COLOR. Procedure Date  Date: 09/09/2021 Start: 10:12 AM    Study Location: Echo Lab  Technical Quality: Adequate visualization    Indications:Lower extremity edema and Heart murmur. Additional Medical History: Former smoker, heart murmur    Patient Status: Routine    Height: 64 inches Weight: 108 pounds BSA: 1.51 m^2 BMI: 18.54 kg/m^2    BP: 122/58 mmHg    Conclusions    Summary  Ejection fraction is visually estimated at 50%. Overall left ventricular function is normal.  Aortic valve appears tricuspid. Thickened aortic valve leaflets noted. Aortic valve leaflets are Moderately calcified. Moderate-to-severe aortic regurgitation is noted. Mild aortic stenosis is present. Signature    ----------------------------------------------------------------  Electronically signed by Delano Barbosa MD (Interpreting  physician) on 09/09/2021 at 04:33 PM  ----------------------------------------------------------------    Findings    Mitral Valve  Mild mitral regurgitation is present.     Aortic Valve  Aortic valve appears tricuspid. Thickened aortic valve leaflets noted. Aortic valve leaflets are Moderately calcified. Moderate-to-severe aortic regurgitation is noted. Mild aortic stenosis is present. Tricuspid Valve  Mild tricuspid regurgitation visualized. Pulmonic Valve  The pulmonic valve leaflets exhibited normal thickness, no calcification,  and normal cuspal separation. DOPPLER: The transpulmonic velocity was  within the normal range with no evidence for regurgitation. Left Atrium  Left atrial size was normal.    Left Ventricle  Ejection fraction is visually estimated at 50%. Overall left ventricular function is normal.    Right Atrium  Right atrial size was normal.    Right Ventricle  The right ventricular size was normal with normal systolic function and  wall thickness. Pericardial Effusion  The pericardium was normal in appearance with no evidence of a pericardial  effusion. Pleural Effusion  No evidence of pleural effusion. Aorta / Great Vessels  -Aortic root dimension within normal limits.  -The Pulmonary artery is within normal limits. -IVC size is within normal limits with normal respiratory phasic changes.     M-Mode/2D Measurements & Calculations    LV Diastolic   LV Systolic Dimension:    AV Cusp Separation: 1.4 cmLA  Dimension: 5   3.6 cm                    Dimension: 3.2 cmAO Root  cm             LV Volume Diastolic: 101  Dimension: 3.7 cmLA Area: 18.2  LV FS:28 %     ml                        cm^2  LV PW          LV Volume Systolic: 48.2  Diastolic: 0.9 ml  cm             LV EDV/LV EDV Index: 118  Septum         ml/78 m^2LV ESV/LV ESV    RV Diastolic Dimension: 2.4 cm  Diastolic: 0.7 Index: 35.1 ml/36 m^2  cm             EF Calculated: 53.9 %     LA/Aorta: 0.86    LA volume/Index: 52.5 ml /35m^2    LVOT: 2.1 cm    Doppler Measurements & Calculations    MV Peak E-Wave:     AV Peak Velocity: 183   LVOT Peak Velocity: 131 cm/s  93.8 cm/s           cm/s                    LVOT Mean Velocity: 95.6 cm/s  MV Peak A-Wave: 59  AV Peak Gradient: 13.4  LVOT Peak Gradient: 7 mmHgLVOT  cm/s                mmHg                    Mean Gradient: 4 mmHg  MV E/A Ratio: 1.59  AV Mean Velocity: 135  MV Peak Gradient:   cm/s                    TV Peak E-Wave: 74.6 cm/s  3.52 mmHg           AV Mean Gradient: 8     TV Peak A-Wave: 42.4 cm/s  mmHg  MV Deceleration     AV VTI: 42 cm           TV Peak Gradient: 2.23 mmHg  Time: 201 msec      AV Area                 TR Velocity:271 cm/s  MV P1/2t: 59 msec   (Continuity):2.09 cm^2  TR Gradient:29.38 mmHg  MVA by PHT:3.73                             PV Peak Velocity: 55.7 cm/s  cm^2                LVOT VTI: 25.4 cm       PV Peak Gradient: 1.24 mmHg  AV P1/2t: 255 msec  MV E' Septal  Velocity: 11.9 cm/s  MV A' Septal  Velocity: 15.8 cm/s AV DVI (VTI): 0.6AV DVI  MV E' Lateral       (Vmax):0.72  Velocity: 12.1 cm/s  MV A' Lateral  Velocity: 7 cm/s  E/E' septal: 7.88  E/E' lateral: 7.75  MR Velocity: 404  cm/s    [email or web address]\""[email or web address]? [id number]%3d       Left heart catheterization: no CAD    Assessment/Plan     78year old female with moderate-severe symptomatic aortic insufficiency. Plan bioprosthetic AVR via a minimally invasive approach, scheduled for 11/19. Patient will need dental clearance prior to valve replacement. The risks, benefits and alternatives were discussed in detail with the patient and family. The risks include bleeding, infection, graft failure, cardiac arrhythmias, thromboembolism, stroke, need for reoperation and death. The patient expressed understanding of these issues, confirmed that all questions were answered, and desires to proceed. Total time spent on patient: 80 minutes, excluding any procedure.     Electronically signed by Dennis Fernandez MD   10/26/2021 at 10:42 AM EDT

## 2021-10-26 NOTE — TELEPHONE ENCOUNTER
Patient was seen in clinic today for surgical consultation with Dr Dillan Newby. MINIMALLY INVASIVE TISSUE AVR scheduled for 11/19/21 at 0AM. Patient agreed to date and time. Surgery instructions are as follows:  - Arrive to [facility] [address] [facility] at 530AM  - NPO after midnight the night before surgery  - Carotid duplex scheduled for 11/12/21 at 1000AM  - PAT visit scheduled for 11/15/21 at 800AM  - No meds to hold    Dental clearance form given. All instructions discussed and form given during office visit. All questions and concerns answered. She verbalized understanding.

## 2021-11-02 RX ORDER — SODIUM CHLORIDE 0.9 % (FLUSH) 0.9 %
10 SYRINGE (ML) INJECTION EVERY 12 HOURS SCHEDULED
Status: CANCELLED | OUTPATIENT
Start: 2021-11-02

## 2021-11-02 RX ORDER — CHLORHEXIDINE GLUCONATE ORAL RINSE 1.2 MG/ML
15 SOLUTION DENTAL ONCE
Status: CANCELLED | OUTPATIENT
Start: 2021-11-02 | End: 2021-11-02

## 2021-11-02 RX ORDER — SODIUM CHLORIDE 0.9 % (FLUSH) 0.9 %
10 SYRINGE (ML) INJECTION PRN
Status: CANCELLED | OUTPATIENT
Start: 2021-11-02

## 2021-11-02 RX ORDER — AMIODARONE HYDROCHLORIDE 200 MG/1
200 TABLET ORAL ONCE
Status: CANCELLED | OUTPATIENT
Start: 2021-11-02 | End: 2021-11-02

## 2021-11-02 RX ORDER — CHLORHEXIDINE GLUCONATE 4 G/100ML
SOLUTION TOPICAL ONCE
Status: CANCELLED | OUTPATIENT
Start: 2021-11-02 | End: 2021-11-02

## 2021-11-02 RX ORDER — SODIUM CHLORIDE 9 MG/ML
25 INJECTION, SOLUTION INTRAVENOUS PRN
Status: CANCELLED | OUTPATIENT
Start: 2021-11-02

## 2021-11-03 NOTE — TELEPHONE ENCOUNTER
PRIOR AUTHORIZATION:    APPROVED  Authorization # [id number]  CPT: L8965753  ICD: I35.1  DOS: 11/19/21  Valid for 1 inpatient day

## 2021-11-04 ENCOUNTER — TELEPHONE (OUTPATIENT)
Dept: CARDIOTHORACIC SURGERY | Age: 66
End: 2021-11-04

## 2021-11-04 NOTE — TELEPHONE ENCOUNTER
Dr Terryann Goldberg - patient's insurance is denying carotid duplex scan scheduled for 11/12/21 because it is not meeting criteria for medical necessity  A [id number] was done by Misael Mcclellan PA-C on 10/26/21 but it was still denied  Prakash Frey from [employer] called me today to inform me that we can submit an appeal letter if this test is necessary  Also spoke with Chayo Muir with [employer], who states there is no claim number since the services have not been rendered yet  Call reference # [id number]    Would you be willing to do an appeal letter for this scan? Please advise.     Fax: [phone]  Mailing address[facility] [address], [facility]

## 2021-11-05 NOTE — TELEPHONE ENCOUNTER
Noted. Carotid duplex scan cancelled per Dr Delano Saenz. Called patient and informed her of this. She voiced understanding.

## 2021-11-08 PROCEDURE — APPSS15 APP SPLIT SHARED TIME 0-15 MINUTES: Performed by: PHYSICIAN ASSISTANT

## 2021-11-08 NOTE — PROGRESS NOTES
STS Adult Cardiac Surgery Database Version 4.20  RISK SCORES  Procedure: Isolated AVR  Risk of Mortality:  2.139%  Renal Failure:  1.990%  Permanent Stroke:  1.258%  Prolonged Ventilation:  8.694%  DSW Infection:  0.032%  Reoperation:  5.870%  Morbidity or Mortality:  15.893%  Short Length of Stay:  40.223%  Long Length of Stay:  4.988%

## 2021-11-08 NOTE — PROGRESS NOTES
PAT appointment reminder call  Arrival time and location given 11/15 at 07:30 in [facility]  Bring drivers license and insurance  Bring list of medications with dosage and frequency  If possible bring caregiver for appointment  Appointment may last 2 hours

## 2021-11-15 ENCOUNTER — HOSPITAL ENCOUNTER (OUTPATIENT)
Dept: GENERAL RADIOLOGY | Age: 66
Discharge: HOME | End: 2021-11-15
Payer: MEDICARE

## 2021-11-15 ENCOUNTER — OFFICE VISIT (OUTPATIENT)
Dept: CARDIOTHORACIC SURGERY | Age: 66
End: 2021-11-15

## 2021-11-15 ENCOUNTER — HOSPITAL ENCOUNTER (OUTPATIENT)
Dept: PREADMISSION TESTING | Age: 66
Discharge: HOME | End: 2021-11-19
Payer: MEDICARE

## 2021-11-15 VITALS
RESPIRATION RATE: 16 BRPM | OXYGEN SATURATION: 100 % | TEMPERATURE: 98.7 F | HEART RATE: 82 BPM | BODY MASS INDEX: 17.93 KG/M2 | DIASTOLIC BLOOD PRESSURE: 49 MMHG | WEIGHT: 105 LBS | SYSTOLIC BLOOD PRESSURE: 139 MMHG | HEIGHT: 64 IN

## 2021-11-15 DIAGNOSIS — I35.1 AORTIC VALVE INSUFFICIENCY, ACQUIRED: Primary | ICD-10-CM

## 2021-11-15 DIAGNOSIS — Z01.818 PRE-OP TESTING: ICD-10-CM

## 2021-11-15 LAB
ABO: NORMAL
ANION GAP SERPL CALCULATED.3IONS-SCNC: 9 MEQ/L (ref 8–16)
ANTIBODY SCREEN: NORMAL
AVERAGE GLUCOSE: 78 MG/DL (ref 70–126)
BACTERIA: ABNORMAL
BILIRUBIN URINE: NEGATIVE
BLOOD, URINE: NEGATIVE
BUN BLDV-MCNC: 19 MG/DL (ref 7–22)
CALCIUM SERPL-MCNC: 9.4 MG/DL (ref 8.5–10.5)
CASTS: ABNORMAL /LPF
CASTS: ABNORMAL /LPF
CHARACTER, URINE: CLEAR
CHLORIDE BLD-SCNC: 99 MEQ/L (ref 98–111)
CO2: 27 MEQ/L (ref 23–33)
COLOR: YELLOW
CREAT SERPL-MCNC: 0.7 MG/DL (ref 0.4–1.2)
CRYSTALS: ABNORMAL
EKG ATRIAL RATE: 79 BPM
EKG P AXIS: 44 DEGREES
EKG P-R INTERVAL: 124 MS
EKG Q-T INTERVAL: 374 MS
EKG QRS DURATION: 78 MS
EKG QTC CALCULATION (BAZETT): 428 MS
EKG R AXIS: 55 DEGREES
EKG T AXIS: 74 DEGREES
EKG VENTRICULAR RATE: 79 BPM
EPITHELIAL CELLS, UA: ABNORMAL /HPF
ERYTHROCYTE [DISTWIDTH] IN BLOOD BY AUTOMATED COUNT: 14.5 % (ref 11.5–14.5)
ERYTHROCYTE [DISTWIDTH] IN BLOOD BY AUTOMATED COUNT: 54.1 FL (ref 35–45)
GFR SERPL CREATININE-BSD FRML MDRD: 83 ML/MIN/1.73M2
GLUCOSE BLD-MCNC: 99 MG/DL (ref 70–108)
GLUCOSE, URINE: NEGATIVE MG/DL
HBA1C MFR BLD: 4.6 % (ref 4.4–6.4)
HCT VFR BLD CALC: 36 % (ref 37–47)
HEMOGLOBIN: 11.5 GM/DL (ref 12–16)
INR BLD: 1.07 (ref 0.85–1.13)
KETONES, URINE: NEGATIVE
LEUKOCYTE EST, POC: ABNORMAL
MCH RBC QN AUTO: 32.9 PG (ref 26–33)
MCHC RBC AUTO-ENTMCNC: 31.9 GM/DL (ref 32.2–35.5)
MCV RBC AUTO: 102.9 FL (ref 81–99)
MISCELLANEOUS LAB TEST RESULT: ABNORMAL
MRSA NASAL SCREEN RT-PCR: NEGATIVE
NITRITE, URINE: NEGATIVE
PH UA: 5.5 (ref 5–9)
PLATELET # BLD: 137 THOU/MM3 (ref 130–400)
PMV BLD AUTO: 11.2 FL (ref 9.4–12.4)
POTASSIUM SERPL-SCNC: 4.8 MEQ/L (ref 3.5–5.2)
PROTEIN UA: NEGATIVE MG/DL
RBC # BLD: 3.5 MILL/MM3 (ref 4.2–5.4)
RBC URINE: ABNORMAL /HPF
RENAL EPITHELIAL, UA: ABNORMAL
RH FACTOR: NORMAL
SODIUM BLD-SCNC: 135 MEQ/L (ref 135–145)
SPECIFIC GRAVITY UA: < 1.005 (ref 1–1.03)
STAPH AUREUS SCREEN RT-PCR: NEGATIVE
UROBILINOGEN, URINE: 0.2 EU/DL (ref 0–1)
VANCOMYCIN RESISTANT ENTEROCOCCUS: NEGATIVE
WBC # BLD: 3.2 THOU/MM3 (ref 4.8–10.8)
WBC UA: ABNORMAL /HPF
YEAST: ABNORMAL

## 2021-11-15 PROCEDURE — 99999 PR OFFICE/OUTPT VISIT,PROCEDURE ONLY: CPT | Performed by: THORACIC SURGERY (CARDIOTHORACIC VASCULAR SURGERY)

## 2021-11-15 PROCEDURE — 81001 URINALYSIS AUTO W/SCOPE: CPT

## 2021-11-15 PROCEDURE — 93010 ELECTROCARDIOGRAM REPORT: CPT | Performed by: NUCLEAR MEDICINE

## 2021-11-15 PROCEDURE — 86850 RBC ANTIBODY SCREEN: CPT

## 2021-11-15 PROCEDURE — 87081 CULTURE SCREEN ONLY: CPT

## 2021-11-15 PROCEDURE — 86923 COMPATIBILITY TEST ELECTRIC: CPT

## 2021-11-15 PROCEDURE — 93005 ELECTROCARDIOGRAM TRACING: CPT

## 2021-11-15 PROCEDURE — 86901 BLOOD TYPING SEROLOGIC RH(D): CPT

## 2021-11-15 PROCEDURE — 85610 PROTHROMBIN TIME: CPT

## 2021-11-15 PROCEDURE — 71046 X-RAY EXAM CHEST 2 VIEWS: CPT

## 2021-11-15 PROCEDURE — 80048 BASIC METABOLIC PNL TOTAL CA: CPT

## 2021-11-15 PROCEDURE — 86900 BLOOD TYPING SEROLOGIC ABO: CPT

## 2021-11-15 PROCEDURE — 87500 VANOMYCIN DNA AMP PROBE: CPT

## 2021-11-15 PROCEDURE — 36415 COLL VENOUS BLD VENIPUNCTURE: CPT

## 2021-11-15 PROCEDURE — 87640 STAPH A DNA AMP PROBE: CPT

## 2021-11-15 PROCEDURE — 83036 HEMOGLOBIN GLYCOSYLATED A1C: CPT

## 2021-11-15 PROCEDURE — 85027 COMPLETE CBC AUTOMATED: CPT

## 2021-11-15 PROCEDURE — 87641 MR-STAPH DNA AMP PROBE: CPT

## 2021-11-15 NOTE — PROGRESS NOTES
Patient is here today for Consent signing. Patient will undergo a minimally invasive tissue aortic valve replacement on 11.19.2021 with Dr. Kim Farias reviewed.    Consents signed

## 2021-11-15 NOTE — PROGRESS NOTES
In preparation for their surgical procedure above patient was screened for Obstructive Sleep Apnea (SARAH) using the STOP-Bang Questionnaire by the Pre-Admission Testing department. This is a pre-surgical screening tool for patient safety and serves as a recommendation, this WILL NOT cause cancellation of surgery. STOP-Bang Questionnaire  * Do you currently see a pulmonologist?  No     If yes STOP, do not complete. Patient follows with Dr.     1.  Do you snore loudly (able to be heard in the next room)? No    2. Do you often feel tired or sleepy during the daytime? No       3. Has anyone ever told you that you stop breathing during your sleep? No    4. Do you have or are you being treated for high blood pressure? No      5. BMI more than 35? BMI (Calculated): 18        No    6. Age over 48 years? 78 y.o. Yes    7. Neck Circumference greater than 17 inches for male or 16 inches for female? Measured  13\"       (visits only)            No    8. Gender Male? No      TOTAL SCORE: 1    SARAH - Low Risk : Yes to 0 - 2 questions  SARAH - Intermediate Risk : Yes to 3 - 4 questions  SARAH - High Risk : Yes to 5 - 8 questions    Adapted from:   STOP Questionnaire: A Tool to Screen Patients for Obstructive Sleep Apnea   [employer]., Paul Cárdenas M.B.B.S., Syeda Watt M.D., Maura Rocha, Ph.D., RADHA Barrientos.B.B.S., Christie Mcclelland, M.Sc., Ly Cantu M.D., Osei Maya. [employer] Mathis[employer]    Anesthesiology 2008; 383:306-62 Copyright 2008, the [address] of Anesthesi[employer], [facility] 37.   ----------------------------------------------------------------------------------------------------------------

## 2021-11-15 NOTE — PROGRESS NOTES
Pain: Today Grijalva Cassette denies pain. Pain Scale and pain management reviewed with understanding verbalized.

## 2021-11-15 NOTE — PROGRESS NOTES
NPO after midnight  [employer] and drivers license  Wear comfortable clean clothing  Do not bring jewelry  Bring medications in original bottles  Routine preop instructions given for pain, hand hygiene, fall prevention, infection prevention, anesthesia, cough and deep breath, and progressive diet and ambulation  Shower and wash hair with chlorhexidine soap morning of surgery  IS instruction given and return demonstration  Reviewed information in open heart booklet and questions answered  To help prevent bowel problems after surgery we ask that you drink a hot beverage and eat an Activia yogurt with each meal starting 3-4 days before surgery.   Viewed open heart video  Exercise physiology in and instructed pt  IS 1750  Follow all instructions give by your physician     needed at discharge  Report to SDS on 2nd floor  If you would become ill prior to surgery, please call the surgeon  Please bring and wear mask

## 2021-11-15 NOTE — PROGRESS NOTES
Exercise Physiology  Pre [facility] Exercise Instruction & 5 Meter Walk Test    Pre OHS exercise education given:  Yes    Sternal precautions reviewed:  Yes    Phase 2 Cardiac Rehab post surgery discussed:  Yes    Where does patient prefer to do Phase 2 Cardiac Rehab:  [facility]    Orthopedic issues:  Yes   *If yes, please list:  Has had a hip replacement

## 2021-11-17 LAB — MRSA SCREEN: NORMAL

## 2021-11-18 ENCOUNTER — ANESTHESIA EVENT (OUTPATIENT)
Dept: OPERATING ROOM | Age: 66
DRG: 220 | End: 2021-11-18
Payer: MEDICARE

## 2021-11-19 ENCOUNTER — ANESTHESIA (OUTPATIENT)
Dept: OPERATING ROOM | Age: 66
DRG: 220 | End: 2021-11-19
Payer: MEDICARE

## 2021-11-19 ENCOUNTER — APPOINTMENT (OUTPATIENT)
Dept: GENERAL RADIOLOGY | Age: 66
DRG: 220 | End: 2021-11-19
Attending: THORACIC SURGERY (CARDIOTHORACIC VASCULAR SURGERY)
Payer: MEDICARE

## 2021-11-19 ENCOUNTER — HOSPITAL ENCOUNTER (INPATIENT)
Age: 66
LOS: 4 days | Discharge: HOME | DRG: 220 | End: 2021-11-23
Attending: THORACIC SURGERY (CARDIOTHORACIC VASCULAR SURGERY) | Admitting: THORACIC SURGERY (CARDIOTHORACIC VASCULAR SURGERY)
Payer: MEDICARE

## 2021-11-19 VITALS — TEMPERATURE: 97.2 F | OXYGEN SATURATION: 99 % | SYSTOLIC BLOOD PRESSURE: 94 MMHG | DIASTOLIC BLOOD PRESSURE: 50 MMHG

## 2021-11-19 DIAGNOSIS — Z95.2 S/P AVR: Primary | ICD-10-CM

## 2021-11-19 PROBLEM — I35.1 MODERATE TO SEVERE AORTIC VALVE REGURGITATION: Status: ACTIVE | Noted: 2021-11-19

## 2021-11-19 LAB
ACT TEG: 97 SECONDS (ref 86–118)
ALLEN TEST: ABNORMAL
ALLEN TEST: ABNORMAL
ANGLE, RAPID TEG: 53.5 DEG (ref 64–80)
ANION GAP SERPL CALCULATED.3IONS-SCNC: 13 MEQ/L (ref 8–16)
BASE EXCESS (CALCULATED): -0.5 MMOL/L (ref -2.5–2.5)
BASE EXCESS (CALCULATED): -1 MMOL/L (ref -2.5–2.5)
BASE EXCESS (CALCULATED): -1.8 MMOL/L (ref -2.5–2.5)
BASE EXCESS (CALCULATED): -2 MMOL/L (ref -2.5–2.5)
BASE EXCESS (CALCULATED): -3.9 MMOL/L (ref -2.5–2.5)
BASE EXCESS (CALCULATED): 1.1 MMOL/L (ref -2.5–2.5)
BASE EXCESS MIXED: 0.6 MMOL/L (ref -2–3)
BUN BLDV-MCNC: 15 MG/DL (ref 7–22)
CALCIUM IONIZED SERUM: 0.93 MMOL/L (ref 1.12–1.32)
CALCIUM IONIZED SERUM: 1.03 MMOL/L (ref 1.12–1.32)
CALCIUM IONIZED SERUM: 1.08 MMOL/L (ref 1.12–1.32)
CALCIUM IONIZED SERUM: 1.22 MMOL/L (ref 1.12–1.32)
CALCIUM IONIZED SERUM: 1.31 MMOL/L (ref 1.12–1.32)
CALCIUM IONIZED: 1.07 MMOL/L (ref 1.12–1.32)
CALCIUM SERPL-MCNC: 7.2 MG/DL (ref 8.5–10.5)
CHLORIDE BLD-SCNC: 107 MEQ/L (ref 98–111)
CO2: 21 MEQ/L (ref 23–33)
COLLECTED BY:: ABNORMAL
COMMENT: ABNORMAL
CREAT SERPL-MCNC: 0.6 MG/DL (ref 0.4–1.2)
DEVICE: ABNORMAL
DEVICE: ABNORMAL
EPL-TEG: 1 % (ref 0–15)
ERYTHROCYTE [DISTWIDTH] IN BLOOD BY AUTOMATED COUNT: 19.1 % (ref 11.5–14.5)
ERYTHROCYTE [DISTWIDTH] IN BLOOD BY AUTOMATED COUNT: 19.6 % (ref 11.5–14.5)
ERYTHROCYTE [DISTWIDTH] IN BLOOD BY AUTOMATED COUNT: 67.6 FL (ref 35–45)
ERYTHROCYTE [DISTWIDTH] IN BLOOD BY AUTOMATED COUNT: 69 FL (ref 35–45)
GFR SERPL CREATININE-BSD FRML MDRD: > 90 ML/MIN/1.73M2
GLUCOSE BLD-MCNC: 103 MG/DL (ref 70–108)
GLUCOSE BLD-MCNC: 104 MG/DL (ref 70–108)
GLUCOSE BLD-MCNC: 105 MG/DL (ref 70–108)
GLUCOSE BLD-MCNC: 108 MG/DL (ref 70–108)
GLUCOSE BLD-MCNC: 142 MG/DL (ref 70–108)
GLUCOSE BLD-MCNC: 143 MG/DL (ref 70–108)
GLUCOSE BLD-MCNC: 163 MG/DL (ref 70–108)
GLUCOSE BLD-MCNC: 195 MG/DL (ref 70–108)
GLUCOSE BLD-MCNC: 70 MG/DL (ref 70–108)
GLUCOSE, WHOLE BLOOD: 112 MG/DL (ref 70–108)
GLUCOSE, WHOLE BLOOD: 117 MG/DL (ref 70–108)
GLUCOSE, WHOLE BLOOD: 147 MG/DL (ref 70–108)
GLUCOSE, WHOLE BLOOD: 175 MG/DL (ref 70–108)
GLUCOSE, WHOLE BLOOD: 183 MG/DL (ref 70–108)
GLUCOSE, WHOLE BLOOD: 218 MG/DL (ref 70–108)
GLUCOSE, WHOLE BLOOD: 92 MG/DL (ref 70–108)
HCO3, MIXED: 25 MMOL/L (ref 23–28)
HCO3: 22 MMOL/L (ref 23–28)
HCO3: 24 MMOL/L (ref 23–28)
HCO3: 24 MMOL/L (ref 23–28)
HCO3: 25 MMOL/L (ref 23–28)
HCO3: 26 MMOL/L (ref 23–28)
HCO3: 27 MMOL/L (ref 23–28)
HCT VFR BLD CALC: 19.9 % (ref 37–47)
HCT VFR BLD CALC: 26.4 % (ref 37–47)
HCT VFR BLD CALC: 29.9 % (ref 37–47)
HCT VFR BLD CALC: 31.9 % (ref 37–47)
HEMOGLOBIN FINGERSTICK, POC: 6.1 G/DL (ref 12–16)
HEMOGLOBIN FINGERSTICK, POC: 6.6 G/DL (ref 12–16)
HEMOGLOBIN FINGERSTICK, POC: 7 G/DL (ref 12–16)
HEMOGLOBIN FINGERSTICK, POC: 8.3 G/DL (ref 12–16)
HEMOGLOBIN FINGERSTICK, POC: 8.3 G/DL (ref 12–16)
HEMOGLOBIN: 10.3 GM/DL (ref 12–16)
HEMOGLOBIN: 6.5 GM/DL (ref 12–16)
HEMOGLOBIN: 8.7 GM/DL (ref 12–16)
HEMOGLOBIN: 9.8 GM/DL (ref 12–16)
HEPARIN THERAPY: NO
IFIO2: 40
IFIO2: 80
KINETICS RAPID TEG: 3.8 MINUTES (ref 0.5–2.3)
LY30 (LYSIS) TEG: 1 % (ref 0–7.5)
MA(MAX CLOT) RAPID TEG: 40.1 MM (ref 52–71)
MAGNESIUM: 3.1 MG/DL (ref 1.6–2.4)
MCH RBC QN AUTO: 31 PG (ref 26–33)
MCH RBC QN AUTO: 31.1 PG (ref 26–33)
MCHC RBC AUTO-ENTMCNC: 32.3 GM/DL (ref 32.2–35.5)
MCHC RBC AUTO-ENTMCNC: 32.8 GM/DL (ref 32.2–35.5)
MCV RBC AUTO: 94.6 FL (ref 81–99)
MCV RBC AUTO: 96.4 FL (ref 81–99)
MODE: ABNORMAL
MODE: ABNORMAL
O2 SAT, MIXED: 76 %
O2 SATURATION: 100 %
O2 SATURATION: 98 %
PCO2, MIXED VENOUS: 39 MMHG (ref 41–51)
PCO2: 42 MMHG (ref 35–45)
PCO2: 42 MMHG (ref 35–45)
PCO2: 44 MMHG (ref 35–45)
PCO2: 45 MMHG (ref 35–45)
PCO2: 46 MMHG (ref 35–45)
PCO2: 70 MMHG (ref 35–45)
PH BLOOD GAS: 7.19 (ref 7.35–7.45)
PH BLOOD GAS: 7.31 (ref 7.35–7.45)
PH BLOOD GAS: 7.33 (ref 7.35–7.45)
PH BLOOD GAS: 7.33 (ref 7.35–7.45)
PH BLOOD GAS: 7.38 (ref 7.35–7.45)
PH BLOOD GAS: 7.4 (ref 7.35–7.45)
PH, MIXED: 7.42 (ref 7.31–7.41)
PIP: 20 CMH2O
PLATELET # BLD: 101 THOU/MM3 (ref 130–400)
PLATELET # BLD: 59 THOU/MM3 (ref 130–400)
PLATELET # BLD: 64 THOU/MM3 (ref 130–400)
PLATELET # BLD: 78 THOU/MM3 (ref 130–400)
PMV BLD AUTO: 10.8 FL (ref 9.4–12.4)
PMV BLD AUTO: 11 FL (ref 9.4–12.4)
PO2 MIXED: 40 MMHG (ref 25–40)
PO2: 117 MMHG (ref 71–104)
PO2: 346 MMHG (ref 71–104)
PO2: 352 MMHG (ref 71–104)
PO2: 373 MMHG (ref 71–104)
PO2: 499 MMHG (ref 71–104)
PO2: 538 MMHG (ref 71–104)
POTASSIUM SERPL-SCNC: 3.9 MEQ/L (ref 3.5–5.2)
POTASSIUM SERPL-SCNC: 4 MEQ/L (ref 3.5–5.2)
POTASSIUM, WHOLE BLOOD: 3.8 MEQ/L (ref 3.5–4.9)
POTASSIUM, WHOLE BLOOD: 3.9 MEQ/L (ref 3.5–4.9)
POTASSIUM, WHOLE BLOOD: 4.1 MEQ/L (ref 3.5–4.9)
POTASSIUM, WHOLE BLOOD: 4.4 MEQ/L (ref 3.5–4.9)
POTASSIUM, WHOLE BLOOD: 4.6 MEQ/L (ref 3.5–4.9)
RBC # BLD: 3.16 MILL/MM3 (ref 4.2–5.4)
RBC # BLD: 3.31 MILL/MM3 (ref 4.2–5.4)
REACTION TIME RAPID TEG: 0.5 MINUTES (ref 0.4–1)
SCAN OF BLOOD SMEAR: NORMAL
SET PEEP: 5 MMHG
SET PEEP: 5 MMHG
SET PRESS SUPP: 10 CMH2O
SET RESPIRATORY RATE: 16 BPM
SODIUM BLD-SCNC: 141 MEQ/L (ref 135–145)
SODIUM, WHOLE BLOOD: 138 MEQ/L (ref 138–146)
SODIUM, WHOLE BLOOD: 138 MEQ/L (ref 138–146)
SODIUM, WHOLE BLOOD: 140 MEQ/L (ref 138–146)
SODIUM, WHOLE BLOOD: 140 MEQ/L (ref 138–146)
SODIUM, WHOLE BLOOD: 143 MEQ/L (ref 138–146)
SOURCE, BLOOD GAS: ABNORMAL
SOURCE, BLOOD GAS: ABNORMAL
WBC # BLD: 3.5 THOU/MM3 (ref 4.8–10.8)
WBC # BLD: 4.6 THOU/MM3 (ref 4.8–10.8)

## 2021-11-19 PROCEDURE — 85027 COMPLETE CBC AUTOMATED: CPT

## 2021-11-19 PROCEDURE — 85018 HEMOGLOBIN: CPT

## 2021-11-19 PROCEDURE — 2500000003 HC RX 250 WO HCPCS: Performed by: REGISTERED NURSE

## 2021-11-19 PROCEDURE — 82948 REAGENT STRIP/BLOOD GLUCOSE: CPT

## 2021-11-19 PROCEDURE — 33405 REPLACEMENT AORTIC VALVE OPN: CPT | Performed by: PHYSICIAN ASSISTANT

## 2021-11-19 PROCEDURE — 2580000003 HC RX 258: Performed by: THORACIC SURGERY (CARDIOTHORACIC VASCULAR SURGERY)

## 2021-11-19 PROCEDURE — 2720000010 HC SURG SUPPLY STERILE: Performed by: THORACIC SURGERY (CARDIOTHORACIC VASCULAR SURGERY)

## 2021-11-19 PROCEDURE — 88305 TISSUE EXAM BY PATHOLOGIST: CPT

## 2021-11-19 PROCEDURE — P9047 ALBUMIN (HUMAN), 25%, 50ML: HCPCS

## 2021-11-19 PROCEDURE — 6360000002 HC RX W HCPCS: Performed by: THORACIC SURGERY (CARDIOTHORACIC VASCULAR SURGERY)

## 2021-11-19 PROCEDURE — 2709999900 HC NON-CHARGEABLE SUPPLY: Performed by: THORACIC SURGERY (CARDIOTHORACIC VASCULAR SURGERY)

## 2021-11-19 PROCEDURE — 82947 ASSAY GLUCOSE BLOOD QUANT: CPT

## 2021-11-19 PROCEDURE — 6360000002 HC RX W HCPCS

## 2021-11-19 PROCEDURE — 6370000000 HC RX 637 (ALT 250 FOR IP): Performed by: THORACIC SURGERY (CARDIOTHORACIC VASCULAR SURGERY)

## 2021-11-19 PROCEDURE — 84132 ASSAY OF SERUM POTASSIUM: CPT

## 2021-11-19 PROCEDURE — 33405 REPLACEMENT AORTIC VALVE OPN: CPT | Performed by: THORACIC SURGERY (CARDIOTHORACIC VASCULAR SURGERY)

## 2021-11-19 PROCEDURE — 6370000000 HC RX 637 (ALT 250 FOR IP): Performed by: REGISTERED NURSE

## 2021-11-19 PROCEDURE — 3600000008 HC SURGERY OHS BASE: Performed by: THORACIC SURGERY (CARDIOTHORACIC VASCULAR SURGERY)

## 2021-11-19 PROCEDURE — 85520 HEPARIN ASSAY: CPT

## 2021-11-19 PROCEDURE — 2580000003 HC RX 258

## 2021-11-19 PROCEDURE — 2500000003 HC RX 250 WO HCPCS: Performed by: THORACIC SURGERY (CARDIOTHORACIC VASCULAR SURGERY)

## 2021-11-19 PROCEDURE — B543ZZA ULTRASONOGRAPHY OF RIGHT JUGULAR VEINS, GUIDANCE: ICD-10-PCS | Performed by: ANESTHESIOLOGY

## 2021-11-19 PROCEDURE — 82803 BLOOD GASES ANY COMBINATION: CPT

## 2021-11-19 PROCEDURE — 6370000000 HC RX 637 (ALT 250 FOR IP): Performed by: PHYSICIAN ASSISTANT

## 2021-11-19 PROCEDURE — 4A143B0 MONITORING OF VENOUS PRESSURE, CENTRAL, PERCUTANEOUS APPROACH: ICD-10-PCS | Performed by: ANESTHESIOLOGY

## 2021-11-19 PROCEDURE — 02HQ33Z INSERTION OF INFUSION DEVICE INTO RIGHT PULMONARY ARTERY, PERCUTANEOUS APPROACH: ICD-10-PCS | Performed by: THORACIC SURGERY (CARDIOTHORACIC VASCULAR SURGERY)

## 2021-11-19 PROCEDURE — C1729 CATH, DRAINAGE: HCPCS | Performed by: THORACIC SURGERY (CARDIOTHORACIC VASCULAR SURGERY)

## 2021-11-19 PROCEDURE — 3700000000 HC ANESTHESIA ATTENDED CARE: Performed by: THORACIC SURGERY (CARDIOTHORACIC VASCULAR SURGERY)

## 2021-11-19 PROCEDURE — 71045 X-RAY EXAM CHEST 1 VIEW: CPT

## 2021-11-19 PROCEDURE — 5A1221Z PERFORMANCE OF CARDIAC OUTPUT, CONTINUOUS: ICD-10-PCS | Performed by: THORACIC SURGERY (CARDIOTHORACIC VASCULAR SURGERY)

## 2021-11-19 PROCEDURE — 82330 ASSAY OF CALCIUM: CPT

## 2021-11-19 PROCEDURE — 83735 ASSAY OF MAGNESIUM: CPT

## 2021-11-19 PROCEDURE — 36415 COLL VENOUS BLD VENIPUNCTURE: CPT

## 2021-11-19 PROCEDURE — P9041 ALBUMIN (HUMAN),5%, 50ML: HCPCS

## 2021-11-19 PROCEDURE — 80048 BASIC METABOLIC PNL TOTAL CA: CPT

## 2021-11-19 PROCEDURE — 2580000003 HC RX 258: Performed by: REGISTERED NURSE

## 2021-11-19 PROCEDURE — P9016 RBC LEUKOCYTES REDUCED: HCPCS

## 2021-11-19 PROCEDURE — 2500000003 HC RX 250 WO HCPCS

## 2021-11-19 PROCEDURE — 03HC33Z INSERTION OF INFUSION DEVICE INTO LEFT RADIAL ARTERY, PERCUTANEOUS APPROACH: ICD-10-PCS | Performed by: ANESTHESIOLOGY

## 2021-11-19 PROCEDURE — P9041 ALBUMIN (HUMAN),5%, 50ML: HCPCS | Performed by: THORACIC SURGERY (CARDIOTHORACIC VASCULAR SURGERY)

## 2021-11-19 PROCEDURE — 6360000002 HC RX W HCPCS: Performed by: REGISTERED NURSE

## 2021-11-19 PROCEDURE — 84295 ASSAY OF SERUM SODIUM: CPT

## 2021-11-19 PROCEDURE — 2500000003 HC RX 250 WO HCPCS: Performed by: PHYSICIAN ASSISTANT

## 2021-11-19 PROCEDURE — B246ZZ4 ULTRASONOGRAPHY OF RIGHT AND LEFT HEART, TRANSESOPHAGEAL: ICD-10-PCS | Performed by: ANESTHESIOLOGY

## 2021-11-19 PROCEDURE — 02RF08Z REPLACEMENT OF AORTIC VALVE WITH ZOOPLASTIC TISSUE, OPEN APPROACH: ICD-10-PCS | Performed by: THORACIC SURGERY (CARDIOTHORACIC VASCULAR SURGERY)

## 2021-11-19 PROCEDURE — 0BH17EZ INSERTION OF ENDOTRACHEAL AIRWAY INTO TRACHEA, VIA NATURAL OR ARTIFICIAL OPENING: ICD-10-PCS | Performed by: THORACIC SURGERY (CARDIOTHORACIC VASCULAR SURGERY)

## 2021-11-19 PROCEDURE — 3700000001 HC ADD 15 MINUTES (ANESTHESIA): Performed by: THORACIC SURGERY (CARDIOTHORACIC VASCULAR SURGERY)

## 2021-11-19 PROCEDURE — 6360000002 HC RX W HCPCS: Performed by: PHYSICIAN ASSISTANT

## 2021-11-19 PROCEDURE — 02NN0ZZ RELEASE PERICARDIUM, OPEN APPROACH: ICD-10-PCS | Performed by: THORACIC SURGERY (CARDIOTHORACIC VASCULAR SURGERY)

## 2021-11-19 PROCEDURE — 2580000003 HC RX 258: Performed by: PHYSICIAN ASSISTANT

## 2021-11-19 PROCEDURE — 3600000018 HC SURGERY OHS ADDTL 15MIN: Performed by: THORACIC SURGERY (CARDIOTHORACIC VASCULAR SURGERY)

## 2021-11-19 PROCEDURE — 94002 VENT MGMT INPAT INIT DAY: CPT

## 2021-11-19 PROCEDURE — 2780000006 HC MISC HEART VALVE: Performed by: THORACIC SURGERY (CARDIOTHORACIC VASCULAR SURGERY)

## 2021-11-19 PROCEDURE — 87086 URINE CULTURE/COLONY COUNT: CPT

## 2021-11-19 PROCEDURE — 2000000000 HC ICU R&B

## 2021-11-19 PROCEDURE — 05HM33Z INSERTION OF INFUSION DEVICE INTO RIGHT INTERNAL JUGULAR VEIN, PERCUTANEOUS APPROACH: ICD-10-PCS | Performed by: ANESTHESIOLOGY

## 2021-11-19 PROCEDURE — 37799 UNLISTED PX VASCULAR SURGERY: CPT

## 2021-11-19 PROCEDURE — 5A1935Z RESPIRATORY VENTILATION, LESS THAN 24 CONSECUTIVE HOURS: ICD-10-PCS | Performed by: THORACIC SURGERY (CARDIOTHORACIC VASCULAR SURGERY)

## 2021-11-19 DEVICE — VALVE AORT SZ 25 H18MM CUF DIA31MM TISS ANNULUS DIA25MM BOV: Type: IMPLANTABLE DEVICE | Site: HEART | Status: FUNCTIONAL

## 2021-11-19 RX ORDER — SODIUM CHLORIDE 0.9 % (FLUSH) 0.9 %
10 SYRINGE (ML) INJECTION PRN
Status: DISCONTINUED | OUTPATIENT
Start: 2021-11-19 | End: 2021-11-19 | Stop reason: HOSPADM

## 2021-11-19 RX ORDER — MULTIVITAMIN WITH IRON
1 TABLET ORAL
Status: DISCONTINUED | OUTPATIENT
Start: 2021-11-20 | End: 2021-11-23 | Stop reason: HOSPADM

## 2021-11-19 RX ORDER — SODIUM CHLORIDE 9 MG/ML
INJECTION, SOLUTION INTRAVENOUS CONTINUOUS
Status: DISCONTINUED | OUTPATIENT
Start: 2021-11-19 | End: 2021-11-20

## 2021-11-19 RX ORDER — SODIUM CHLORIDE 0.9 % (FLUSH) 0.9 %
10 SYRINGE (ML) INJECTION PRN
Status: DISCONTINUED | OUTPATIENT
Start: 2021-11-19 | End: 2021-11-23 | Stop reason: HOSPADM

## 2021-11-19 RX ORDER — ONDANSETRON 2 MG/ML
INJECTION INTRAMUSCULAR; INTRAVENOUS PRN
Status: DISCONTINUED | OUTPATIENT
Start: 2021-11-19 | End: 2021-11-19 | Stop reason: SDUPTHER

## 2021-11-19 RX ORDER — ACETAMINOPHEN 650 MG/1
650 SUPPOSITORY RECTAL EVERY 4 HOURS PRN
Status: DISCONTINUED | OUTPATIENT
Start: 2021-11-19 | End: 2021-11-23 | Stop reason: HOSPADM

## 2021-11-19 RX ORDER — FENTANYL CITRATE 50 UG/ML
INJECTION, SOLUTION INTRAMUSCULAR; INTRAVENOUS PRN
Status: DISCONTINUED | OUTPATIENT
Start: 2021-11-19 | End: 2021-11-19 | Stop reason: SDUPTHER

## 2021-11-19 RX ORDER — MIDAZOLAM HYDROCHLORIDE 1 MG/ML
INJECTION INTRAMUSCULAR; INTRAVENOUS PRN
Status: DISCONTINUED | OUTPATIENT
Start: 2021-11-19 | End: 2021-11-19 | Stop reason: SDUPTHER

## 2021-11-19 RX ORDER — SODIUM CHLORIDE 9 MG/ML
INJECTION, SOLUTION INTRAVENOUS CONTINUOUS
Status: DISCONTINUED | OUTPATIENT
Start: 2021-11-19 | End: 2021-11-19

## 2021-11-19 RX ORDER — SODIUM CHLORIDE 0.9 % (FLUSH) 0.9 %
10 SYRINGE (ML) INJECTION EVERY 12 HOURS SCHEDULED
Status: DISCONTINUED | OUTPATIENT
Start: 2021-11-19 | End: 2021-11-19 | Stop reason: HOSPADM

## 2021-11-19 RX ORDER — MORPHINE SULFATE 2 MG/ML
2 INJECTION, SOLUTION INTRAMUSCULAR; INTRAVENOUS
Status: DISCONTINUED | OUTPATIENT
Start: 2021-11-19 | End: 2021-11-23 | Stop reason: HOSPADM

## 2021-11-19 RX ORDER — METOPROLOL TARTRATE 5 MG/5ML
2.5 INJECTION INTRAVENOUS EVERY 10 MIN PRN
Status: DISCONTINUED | OUTPATIENT
Start: 2021-11-19 | End: 2021-11-23 | Stop reason: HOSPADM

## 2021-11-19 RX ORDER — ATORVASTATIN CALCIUM 20 MG/1
20 TABLET, FILM COATED ORAL NIGHTLY
Status: DISCONTINUED | OUTPATIENT
Start: 2021-11-20 | End: 2021-11-23 | Stop reason: HOSPADM

## 2021-11-19 RX ORDER — AMIODARONE HYDROCHLORIDE 200 MG/1
200 TABLET ORAL 2 TIMES DAILY
Status: DISCONTINUED | OUTPATIENT
Start: 2021-11-19 | End: 2021-11-20

## 2021-11-19 RX ORDER — PROPOFOL 10 MG/ML
INJECTION, EMULSION INTRAVENOUS PRN
Status: DISCONTINUED | OUTPATIENT
Start: 2021-11-19 | End: 2021-11-19 | Stop reason: SDUPTHER

## 2021-11-19 RX ORDER — CEFAZOLIN SODIUM 2 G/100ML
2000 INJECTION, SOLUTION INTRAVENOUS EVERY 8 HOURS
Status: COMPLETED | OUTPATIENT
Start: 2021-11-19 | End: 2021-11-21

## 2021-11-19 RX ORDER — POTASSIUM CHLORIDE 29.8 MG/ML
20 INJECTION INTRAVENOUS PRN
Status: DISCONTINUED | OUTPATIENT
Start: 2021-11-19 | End: 2021-11-23 | Stop reason: HOSPADM

## 2021-11-19 RX ORDER — SODIUM CHLORIDE 9 MG/ML
25 INJECTION, SOLUTION INTRAVENOUS PRN
Status: DISCONTINUED | OUTPATIENT
Start: 2021-11-19 | End: 2021-11-19 | Stop reason: HOSPADM

## 2021-11-19 RX ORDER — ACETAMINOPHEN 325 MG/1
650 TABLET ORAL EVERY 4 HOURS PRN
Status: DISCONTINUED | OUTPATIENT
Start: 2021-11-19 | End: 2021-11-23 | Stop reason: HOSPADM

## 2021-11-19 RX ORDER — SODIUM CHLORIDE, SODIUM GLUCONATE, SODIUM ACETATE, POTASSIUM CHLORIDE AND MAGNESIUM CHLORIDE 526; 502; 368; 37; 30 MG/100ML; MG/100ML; MG/100ML; MG/100ML; MG/100ML
INJECTION, SOLUTION INTRAVENOUS CONTINUOUS PRN
Status: DISCONTINUED | OUTPATIENT
Start: 2021-11-19 | End: 2021-11-19 | Stop reason: SDUPTHER

## 2021-11-19 RX ORDER — AMIODARONE HYDROCHLORIDE 200 MG/1
200 TABLET ORAL ONCE
Status: COMPLETED | OUTPATIENT
Start: 2021-11-19 | End: 2021-11-19

## 2021-11-19 RX ORDER — DEXTROSE MONOHYDRATE 25 G/50ML
12.5 INJECTION, SOLUTION INTRAVENOUS PRN
Status: DISCONTINUED | OUTPATIENT
Start: 2021-11-19 | End: 2021-11-23 | Stop reason: HOSPADM

## 2021-11-19 RX ORDER — OXYCODONE HYDROCHLORIDE 5 MG/1
5 TABLET ORAL EVERY 4 HOURS PRN
Status: DISCONTINUED | OUTPATIENT
Start: 2021-11-19 | End: 2021-11-23 | Stop reason: HOSPADM

## 2021-11-19 RX ORDER — LIDOCAINE HYDROCHLORIDE 20 MG/ML
INJECTION, SOLUTION INTRAVENOUS PRN
Status: DISCONTINUED | OUTPATIENT
Start: 2021-11-19 | End: 2021-11-19 | Stop reason: SDUPTHER

## 2021-11-19 RX ORDER — SODIUM CHLORIDE 9 MG/ML
25 INJECTION, SOLUTION INTRAVENOUS PRN
Status: DISCONTINUED | OUTPATIENT
Start: 2021-11-19 | End: 2021-11-23 | Stop reason: HOSPADM

## 2021-11-19 RX ORDER — ROCURONIUM BROMIDE 10 MG/ML
INJECTION, SOLUTION INTRAVENOUS PRN
Status: DISCONTINUED | OUTPATIENT
Start: 2021-11-19 | End: 2021-11-19 | Stop reason: SDUPTHER

## 2021-11-19 RX ORDER — FAMOTIDINE 20 MG/1
20 TABLET, FILM COATED ORAL 2 TIMES DAILY
Status: DISCONTINUED | OUTPATIENT
Start: 2021-11-19 | End: 2021-11-23 | Stop reason: HOSPADM

## 2021-11-19 RX ORDER — CEFAZOLIN SODIUM 2 G/100ML
2000 INJECTION, SOLUTION INTRAVENOUS
Status: COMPLETED | OUTPATIENT
Start: 2021-11-19 | End: 2021-11-19

## 2021-11-19 RX ORDER — ONDANSETRON 2 MG/ML
4 INJECTION INTRAMUSCULAR; INTRAVENOUS EVERY 6 HOURS PRN
Status: DISCONTINUED | OUTPATIENT
Start: 2021-11-19 | End: 2021-11-23 | Stop reason: HOSPADM

## 2021-11-19 RX ORDER — DEXAMETHASONE SODIUM PHOSPHATE 10 MG/ML
INJECTION, EMULSION INTRAMUSCULAR; INTRAVENOUS PRN
Status: DISCONTINUED | OUTPATIENT
Start: 2021-11-19 | End: 2021-11-19 | Stop reason: SDUPTHER

## 2021-11-19 RX ORDER — ALBUTEROL SULFATE 90 UG/1
2 AEROSOL, METERED RESPIRATORY (INHALATION) EVERY 6 HOURS PRN
Status: DISCONTINUED | OUTPATIENT
Start: 2021-11-19 | End: 2021-11-23 | Stop reason: HOSPADM

## 2021-11-19 RX ORDER — ENALAPRILAT 2.5 MG/2ML
0.62 INJECTION INTRAVENOUS
Status: DISCONTINUED | OUTPATIENT
Start: 2021-11-19 | End: 2021-11-23 | Stop reason: HOSPADM

## 2021-11-19 RX ORDER — AMINOCAPROIC ACID 250 MG/ML
INJECTION, SOLUTION INTRAVENOUS PRN
Status: DISCONTINUED | OUTPATIENT
Start: 2021-11-19 | End: 2021-11-19 | Stop reason: SDUPTHER

## 2021-11-19 RX ORDER — ALBUMIN, HUMAN INJ 5% 5 %
25 SOLUTION INTRAVENOUS PRN
Status: DISCONTINUED | OUTPATIENT
Start: 2021-11-19 | End: 2021-11-23 | Stop reason: HOSPADM

## 2021-11-19 RX ORDER — FLUTICASONE PROPIONATE 50 MCG
1 SPRAY, SUSPENSION (ML) NASAL DAILY
Status: DISCONTINUED | OUTPATIENT
Start: 2021-11-20 | End: 2021-11-23 | Stop reason: HOSPADM

## 2021-11-19 RX ORDER — CHLORHEXIDINE GLUCONATE 4 G/100ML
SOLUTION TOPICAL ONCE
Status: DISCONTINUED | OUTPATIENT
Start: 2021-11-19 | End: 2021-11-19 | Stop reason: HOSPADM

## 2021-11-19 RX ORDER — SENNOSIDES 8.8 MG/5ML
10 LIQUID ORAL DAILY PRN
Status: DISCONTINUED | OUTPATIENT
Start: 2021-11-19 | End: 2021-11-23 | Stop reason: HOSPADM

## 2021-11-19 RX ORDER — PROTAMINE SULFATE 10 MG/ML
50 INJECTION, SOLUTION INTRAVENOUS
Status: DISPENSED | OUTPATIENT
Start: 2021-11-19 | End: 2021-11-19

## 2021-11-19 RX ORDER — SODIUM CHLORIDE 0.9 % (FLUSH) 0.9 %
10 SYRINGE (ML) INJECTION EVERY 12 HOURS SCHEDULED
Status: DISCONTINUED | OUTPATIENT
Start: 2021-11-19 | End: 2021-11-23 | Stop reason: HOSPADM

## 2021-11-19 RX ORDER — CHLORHEXIDINE GLUCONATE 0.12 MG/ML
15 RINSE ORAL ONCE
Status: COMPLETED | OUTPATIENT
Start: 2021-11-19 | End: 2021-11-19

## 2021-11-19 RX ORDER — ALBUMIN, HUMAN INJ 5% 5 %
SOLUTION INTRAVENOUS
Status: COMPLETED
Start: 2021-11-19 | End: 2021-11-19

## 2021-11-19 RX ORDER — DEXTROSE MONOHYDRATE 50 MG/ML
100 INJECTION, SOLUTION INTRAVENOUS PRN
Status: DISCONTINUED | OUTPATIENT
Start: 2021-11-19 | End: 2021-11-23 | Stop reason: HOSPADM

## 2021-11-19 RX ORDER — OXYCODONE HYDROCHLORIDE 5 MG/1
10 TABLET ORAL EVERY 4 HOURS PRN
Status: DISCONTINUED | OUTPATIENT
Start: 2021-11-19 | End: 2021-11-23 | Stop reason: HOSPADM

## 2021-11-19 RX ORDER — PROTAMINE SULFATE 10 MG/ML
INJECTION, SOLUTION INTRAVENOUS PRN
Status: DISCONTINUED | OUTPATIENT
Start: 2021-11-19 | End: 2021-11-19 | Stop reason: SDUPTHER

## 2021-11-19 RX ORDER — HEPARIN SODIUM 1000 [USP'U]/ML
INJECTION, SOLUTION INTRAVENOUS; SUBCUTANEOUS PRN
Status: DISCONTINUED | OUTPATIENT
Start: 2021-11-19 | End: 2021-11-19 | Stop reason: SDUPTHER

## 2021-11-19 RX ORDER — NICOTINE POLACRILEX 4 MG
15 LOZENGE BUCCAL PRN
Status: DISCONTINUED | OUTPATIENT
Start: 2021-11-19 | End: 2021-11-23 | Stop reason: HOSPADM

## 2021-11-19 RX ADMIN — ROCURONIUM BROMIDE 50 MG: 10 INJECTION INTRAVENOUS at 08:12

## 2021-11-19 RX ADMIN — POTASSIUM CHLORIDE 20 MEQ: 29.8 INJECTION, SOLUTION INTRAVENOUS at 20:32

## 2021-11-19 RX ADMIN — FAMOTIDINE 20 MG: 10 INJECTION, SOLUTION INTRAVENOUS at 20:29

## 2021-11-19 RX ADMIN — LIDOCAINE HYDROCHLORIDE 60 MG: 20 INJECTION, SOLUTION INTRAVENOUS at 08:12

## 2021-11-19 RX ADMIN — HEPARIN SODIUM 10000 UNITS: 1000 INJECTION INTRAVENOUS; SUBCUTANEOUS at 09:36

## 2021-11-19 RX ADMIN — AMIODARONE HYDROCHLORIDE 200 MG: 200 TABLET ORAL at 20:28

## 2021-11-19 RX ADMIN — ALBUMIN (HUMAN) 25 G: 12.5 INJECTION, SOLUTION INTRAVENOUS at 12:49

## 2021-11-19 RX ADMIN — AMINOCAPROIC ACID 5000 MG: 250 INJECTION, SOLUTION INTRAVENOUS at 11:34

## 2021-11-19 RX ADMIN — ONDANSETRON HYDROCHLORIDE 4 MG: 4 INJECTION, SOLUTION INTRAMUSCULAR; INTRAVENOUS at 08:35

## 2021-11-19 RX ADMIN — FENTANYL CITRATE 50 MCG: 50 INJECTION INTRAMUSCULAR; INTRAVENOUS at 08:44

## 2021-11-19 RX ADMIN — CEFAZOLIN SODIUM 2000 MG: 2 INJECTION, SOLUTION INTRAVENOUS at 22:32

## 2021-11-19 RX ADMIN — DEXAMETHASONE SODIUM PHOSPHATE 8 MG: 10 INJECTION, EMULSION INTRAMUSCULAR; INTRAVENOUS at 08:35

## 2021-11-19 RX ADMIN — CEFAZOLIN SODIUM 2000 MG: 2 INJECTION, SOLUTION INTRAVENOUS at 11:34

## 2021-11-19 RX ADMIN — MIDAZOLAM 2 MG: 1 INJECTION INTRAMUSCULAR; INTRAVENOUS at 08:06

## 2021-11-19 RX ADMIN — HEPARIN SODIUM 16000 UNITS: 1000 INJECTION INTRAVENOUS; SUBCUTANEOUS at 08:57

## 2021-11-19 RX ADMIN — SODIUM CHLORIDE 3.2 UNITS/HR: 9 INJECTION, SOLUTION INTRAVENOUS at 15:01

## 2021-11-19 RX ADMIN — FENTANYL CITRATE 50 MCG: 50 INJECTION INTRAMUSCULAR; INTRAVENOUS at 09:21

## 2021-11-19 RX ADMIN — SODIUM CHLORIDE, PRESERVATIVE FREE 10 ML: 5 INJECTION INTRAVENOUS at 20:28

## 2021-11-19 RX ADMIN — Medication 10 UNITS: at 10:43

## 2021-11-19 RX ADMIN — SODIUM CHLORIDE: 9 INJECTION, SOLUTION INTRAVENOUS at 06:26

## 2021-11-19 RX ADMIN — FENTANYL CITRATE 50 MCG: 50 INJECTION INTRAMUSCULAR; INTRAVENOUS at 08:20

## 2021-11-19 RX ADMIN — DEXTROSE MONOHYDRATE 24 G: 25 INJECTION, SOLUTION INTRAVENOUS at 22:20

## 2021-11-19 RX ADMIN — AMIODARONE HYDROCHLORIDE 200 MG: 200 TABLET ORAL at 06:30

## 2021-11-19 RX ADMIN — ALBUMIN (HUMAN) 25 G: 12.5 INJECTION, SOLUTION INTRAVENOUS at 13:47

## 2021-11-19 RX ADMIN — AMINOCAPROIC ACID 1 G/HR: 250 INJECTION, SOLUTION INTRAVENOUS at 12:14

## 2021-11-19 RX ADMIN — OXYCODONE HYDROCHLORIDE 5 MG: 5 TABLET ORAL at 23:04

## 2021-11-19 RX ADMIN — METOPROLOL TARTRATE 25 MG: 25 TABLET, FILM COATED ORAL at 22:14

## 2021-11-19 RX ADMIN — CALCIUM GLUCONATE 1000 MG: 98 INJECTION, SOLUTION INTRAVENOUS at 19:49

## 2021-11-19 RX ADMIN — EPINEPHRINE 4 MCG/MIN: 1 INJECTION INTRAMUSCULAR; INTRAVENOUS; SUBCUTANEOUS at 11:12

## 2021-11-19 RX ADMIN — Medication 6 UNITS: at 10:13

## 2021-11-19 RX ADMIN — FENTANYL CITRATE 100 MCG: 50 INJECTION INTRAMUSCULAR; INTRAVENOUS at 12:32

## 2021-11-19 RX ADMIN — VANCOMYCIN HYDROCHLORIDE 750 MG: 1 INJECTION, POWDER, LYOPHILIZED, FOR SOLUTION INTRAVENOUS at 22:30

## 2021-11-19 RX ADMIN — Medication 5 MCG/MIN: at 12:43

## 2021-11-19 RX ADMIN — MIDAZOLAM 3 MG: 1 INJECTION INTRAMUSCULAR; INTRAVENOUS at 11:01

## 2021-11-19 RX ADMIN — ROCURONIUM BROMIDE 50 MG: 10 INJECTION INTRAVENOUS at 09:12

## 2021-11-19 RX ADMIN — FENTANYL CITRATE 50 MCG: 50 INJECTION INTRAMUSCULAR; INTRAVENOUS at 09:10

## 2021-11-19 RX ADMIN — PROPOFOL 100 MG: 10 INJECTION, EMULSION INTRAVENOUS at 08:12

## 2021-11-19 RX ADMIN — MORPHINE SULFATE 2 MG: 2 INJECTION, SOLUTION INTRAMUSCULAR; INTRAVENOUS at 13:50

## 2021-11-19 RX ADMIN — SODIUM CHLORIDE, SODIUM GLUCONATE, SODIUM ACETATE, POTASSIUM CHLORIDE AND MAGNESIUM CHLORIDE: 526; 502; 368; 37; 30 INJECTION, SOLUTION INTRAVENOUS at 08:50

## 2021-11-19 RX ADMIN — VANCOMYCIN HYDROCHLORIDE 1000 MG: 1 INJECTION, POWDER, LYOPHILIZED, FOR SOLUTION INTRAVENOUS at 08:57

## 2021-11-19 RX ADMIN — Medication 6 UNITS/HR: at 10:13

## 2021-11-19 RX ADMIN — OXYCODONE HYDROCHLORIDE 5 MG: 5 TABLET ORAL at 16:14

## 2021-11-19 RX ADMIN — DESMOPRESSIN ACETATE 24 MCG: 4 INJECTION, SOLUTION INTRAVENOUS; SUBCUTANEOUS at 11:34

## 2021-11-19 RX ADMIN — 0.12% CHLORHEXIDINE GLUCONATE 15 ML: 1.2 RINSE ORAL at 06:30

## 2021-11-19 RX ADMIN — BISACODYL 10 MG: 5 TABLET, COATED ORAL at 20:28

## 2021-11-19 RX ADMIN — OXYCODONE HYDROCHLORIDE 10 MG: 5 TABLET ORAL at 18:43

## 2021-11-19 RX ADMIN — AMINOCAPROIC ACID 5000 MG: 250 INJECTION, SOLUTION INTRAVENOUS at 08:58

## 2021-11-19 RX ADMIN — CEFAZOLIN SODIUM 2000 MG: 2 INJECTION, SOLUTION INTRAVENOUS at 08:57

## 2021-11-19 RX ADMIN — POTASSIUM CHLORIDE 20 MEQ: 29.8 INJECTION, SOLUTION INTRAVENOUS at 19:54

## 2021-11-19 RX ADMIN — FENTANYL CITRATE 50 MCG: 50 INJECTION INTRAMUSCULAR; INTRAVENOUS at 08:57

## 2021-11-19 RX ADMIN — PROTAMINE SULFATE 100 MG: 10 INJECTION, SOLUTION INTRAVENOUS at 11:28

## 2021-11-19 RX ADMIN — SODIUM CHLORIDE: 9 INJECTION, SOLUTION INTRAVENOUS at 13:38

## 2021-11-19 RX ADMIN — ROCURONIUM BROMIDE 50 MG: 10 INJECTION INTRAVENOUS at 10:11

## 2021-11-19 ASSESSMENT — PULMONARY FUNCTION TESTS
PIF_VALUE: 1
PIF_VALUE: 14
PIF_VALUE: 19
PIF_VALUE: 16
PIF_VALUE: 1
PIF_VALUE: 19
PIF_VALUE: 1
PIF_VALUE: 1
PIF_VALUE: 16
PIF_VALUE: 1
PIF_VALUE: 20
PIF_VALUE: 16
PIF_VALUE: 19
PIF_VALUE: 14
PIF_VALUE: 20
PIF_VALUE: 14
PIF_VALUE: 18
PIF_VALUE: 16
PIF_VALUE: 14
PIF_VALUE: 1
PIF_VALUE: 19
PIF_VALUE: 14
PIF_VALUE: 16
PIF_VALUE: 16
PIF_VALUE: 1
PIF_VALUE: 20
PIF_VALUE: 14
PIF_VALUE: 14
PIF_VALUE: 1
PIF_VALUE: 19
PIF_VALUE: 1
PIF_VALUE: 18
PIF_VALUE: 1
PIF_VALUE: 14
PIF_VALUE: 1
PIF_VALUE: 1
PIF_VALUE: 17
PIF_VALUE: 20
PIF_VALUE: 19
PIF_VALUE: 1
PIF_VALUE: 15
PIF_VALUE: 17
PIF_VALUE: 18
PIF_VALUE: 20
PIF_VALUE: 1
PIF_VALUE: 1
PIF_VALUE: 17
PIF_VALUE: 16
PIF_VALUE: 1
PIF_VALUE: 15
PIF_VALUE: 2
PIF_VALUE: 17
PIF_VALUE: 1
PIF_VALUE: 20
PIF_VALUE: 16
PIF_VALUE: 1
PIF_VALUE: 1
PIF_VALUE: 20
PIF_VALUE: 0
PIF_VALUE: 15
PIF_VALUE: 16
PIF_VALUE: 1
PIF_VALUE: 1
PIF_VALUE: 15
PIF_VALUE: 19
PIF_VALUE: 18
PIF_VALUE: 1
PIF_VALUE: 14
PIF_VALUE: 18
PIF_VALUE: 1
PIF_VALUE: 14
PIF_VALUE: 19
PIF_VALUE: 19
PIF_VALUE: 15
PIF_VALUE: 17
PIF_VALUE: 19
PIF_VALUE: 19
PIF_VALUE: 1
PIF_VALUE: 17
PIF_VALUE: 1
PIF_VALUE: 16
PIF_VALUE: 19
PIF_VALUE: 17
PIF_VALUE: 1
PIF_VALUE: 1
PIF_VALUE: 17
PIF_VALUE: 17
PIF_VALUE: 15
PIF_VALUE: 19
PIF_VALUE: 17
PIF_VALUE: 19
PIF_VALUE: 14
PIF_VALUE: 15
PIF_VALUE: 19
PIF_VALUE: 19
PIF_VALUE: 1
PIF_VALUE: 20
PIF_VALUE: 17
PIF_VALUE: 1
PIF_VALUE: 3
PIF_VALUE: 1
PIF_VALUE: 1
PIF_VALUE: 18
PIF_VALUE: 1
PIF_VALUE: 1
PIF_VALUE: 17
PIF_VALUE: 19
PIF_VALUE: 16
PIF_VALUE: 1
PIF_VALUE: 14
PIF_VALUE: 14
PIF_VALUE: 15
PIF_VALUE: 1
PIF_VALUE: 1
PIF_VALUE: 24
PIF_VALUE: 1
PIF_VALUE: 1
PIF_VALUE: 14
PIF_VALUE: 17
PIF_VALUE: 16
PIF_VALUE: 16
PIF_VALUE: 14
PIF_VALUE: 17
PIF_VALUE: 18
PIF_VALUE: 14
PIF_VALUE: 1
PIF_VALUE: 14
PIF_VALUE: 1
PIF_VALUE: 15
PIF_VALUE: 17
PIF_VALUE: 15
PIF_VALUE: 16
PIF_VALUE: 1
PIF_VALUE: 1
PIF_VALUE: 17
PIF_VALUE: 18
PIF_VALUE: 15
PIF_VALUE: 17
PIF_VALUE: 1
PIF_VALUE: 18
PIF_VALUE: 17
PIF_VALUE: 15
PIF_VALUE: 16
PIF_VALUE: 14
PIF_VALUE: 17
PIF_VALUE: 1
PIF_VALUE: 1
PIF_VALUE: 16
PIF_VALUE: 15
PIF_VALUE: 17
PIF_VALUE: 15
PIF_VALUE: 1
PIF_VALUE: 17
PIF_VALUE: 14
PIF_VALUE: 1
PIF_VALUE: 17
PIF_VALUE: 15
PIF_VALUE: 16
PIF_VALUE: 1
PIF_VALUE: 18
PIF_VALUE: 1
PIF_VALUE: 18
PIF_VALUE: 14
PIF_VALUE: 16
PIF_VALUE: 1
PIF_VALUE: 15
PIF_VALUE: 1
PIF_VALUE: 19
PIF_VALUE: 1
PIF_VALUE: 15
PIF_VALUE: 14
PIF_VALUE: 14
PIF_VALUE: 18
PIF_VALUE: 14
PIF_VALUE: 18
PIF_VALUE: 14
PIF_VALUE: 18
PIF_VALUE: 14
PIF_VALUE: 17
PIF_VALUE: 16
PIF_VALUE: 19
PIF_VALUE: 20
PIF_VALUE: 17
PIF_VALUE: 16
PIF_VALUE: 17
PIF_VALUE: 17
PIF_VALUE: 1
PIF_VALUE: 16
PIF_VALUE: 19
PIF_VALUE: 16
PIF_VALUE: 17
PIF_VALUE: 17
PIF_VALUE: 19
PIF_VALUE: 1
PIF_VALUE: 1
PIF_VALUE: 14
PIF_VALUE: 20
PIF_VALUE: 1
PIF_VALUE: 14
PIF_VALUE: 16
PIF_VALUE: 15
PIF_VALUE: 16
PIF_VALUE: 15
PIF_VALUE: 16
PIF_VALUE: 1
PIF_VALUE: 16
PIF_VALUE: 20
PIF_VALUE: 17
PIF_VALUE: 14
PIF_VALUE: 16
PIF_VALUE: 17
PIF_VALUE: 20
PIF_VALUE: 1
PIF_VALUE: 18
PIF_VALUE: 1
PIF_VALUE: 1
PIF_VALUE: 16
PIF_VALUE: 1
PIF_VALUE: 1
PIF_VALUE: 19
PIF_VALUE: 17
PIF_VALUE: 1
PIF_VALUE: 2
PIF_VALUE: 1
PIF_VALUE: 18
PIF_VALUE: 14
PIF_VALUE: 15
PIF_VALUE: 17
PIF_VALUE: 17
PIF_VALUE: 1
PIF_VALUE: 14
PIF_VALUE: 16
PIF_VALUE: 15
PIF_VALUE: 1
PIF_VALUE: 1
PIF_VALUE: 15
PIF_VALUE: 1
PIF_VALUE: 17
PIF_VALUE: 17
PIF_VALUE: 0
PIF_VALUE: 18
PIF_VALUE: 14
PIF_VALUE: 1
PIF_VALUE: 14
PIF_VALUE: 17
PIF_VALUE: 1
PIF_VALUE: 17
PIF_VALUE: 15
PIF_VALUE: 14
PIF_VALUE: 17
PIF_VALUE: 1
PIF_VALUE: 1
PIF_VALUE: 17
PIF_VALUE: 14
PIF_VALUE: 18
PIF_VALUE: 19
PIF_VALUE: 17
PIF_VALUE: 14
PIF_VALUE: 1
PIF_VALUE: 15
PIF_VALUE: 17
PIF_VALUE: 1

## 2021-11-19 ASSESSMENT — PAIN SCALES - GENERAL
PAINLEVEL_OUTOF10: 0
PAINLEVEL_OUTOF10: 0
PAINLEVEL_OUTOF10: 6
PAINLEVEL_OUTOF10: 7
PAINLEVEL_OUTOF10: 6
PAINLEVEL_OUTOF10: 10

## 2021-11-19 ASSESSMENT — PAIN - FUNCTIONAL ASSESSMENT
PAIN_FUNCTIONAL_ASSESSMENT: PREVENTS OR INTERFERES SOME ACTIVE ACTIVITIES AND ADLS
PAIN_FUNCTIONAL_ASSESSMENT: 0-10

## 2021-11-19 NOTE — PROGRESS NOTES
1440 pt extubated per resp therapy. placed on 3 l nc.og also removed. Pt tolerated well. Pt oriented x3. Voice weak hoarse. 1500 family in updated. 1530 pt obtained I.S. . Remains on nasal cannula.

## 2021-11-19 NOTE — ANESTHESIA PROCEDURE NOTES
Procedure Performed: DEREK      Start Time:  11/19/2021 8:50 AM       End Time:      Preanesthesia Checklist:  Patient identified, IV assessed, risks and benefits discussed, monitors and equipment assessed, procedure being performed at surgeon's request and anesthesia consent obtained. General Procedure Information  Diagnostic Indications for Echo:  assessment of ascending aorta, assessment of surgical repair, hemodynamic monitoring and assessment of valve function  Physician Requesting Echo: Tarah Keane MD  CPT Code:  68447,20902,27227  Location performed:  OR  Intubated  Bite block placed  Probe Insertion:  Easy  Probe Type:  3D  Modalities:  2D only, color flow mapping, continuous wave Doppler, pulse wave Doppler and M-mode        Anesthesia Information  Performed with CRNAs  Anesthesiologist:  Giovanni Dewey MD      Echocardiogram Comments:       INTRA OP DEREK  INSERTED WELL LUBRICATED 3 D DEREK PROBE.  EASY ATRAUMATIC INSERTION. COMPREHENSIVE STUDY ACQUIRED. AORTIC VALVE BICUSPID FUSED RIGHT AND NONCORONARY CUSPS. SEVERE AI (PROXIMAL JET WIDTH 0.8 CM ,PISA RADIUS 0.9 CM )  NO AV STENOSIS ( PEAK GRADIENT 12 MM OF HG, MEAN GRADIENT 8 MM OF HG )  PRESERVED SINO TUBULAR JUNCTION. ASCENDING AORTA DILATED 4.3 CM  MITRAL VALVE STRUCTURALLY NORMAL  NO MV STENOSIS, TRACE MR  TRICUSPID VALVE NO TV STENOSIS , TRIVIAL TR  PULMONARY VALVE NORMAL  SLIGHT PERICARDIAL EFFUSION  LV FUNCTION NO RWMA GLOBAL LV HYPOKINESIA EF 42 %  FINDINGS DISCUSSED WITH DR Michele Chen  POST CPB  ASSISTED DE AIRING PROCESS. ASSISTED WEAN OFF CPB. AORTIC VALVE BIOPROSTHETIC VALVE WAS PLACED  9 TFGT -25A )  PROPERLY MOBILE AV CUSPS, NO PERIVALVULAR LEAKS  POST PEAK GRADIENT 13 MM OF HG , MEAN GRADIENT 8 MM OF HG.  PROPERLY FUNCTIONING BIOPROSTHETIC VALVE. LV FUNCTION SLIGHTLY IMPROVED WITH INOTROPIC SUPPORT. NO RWMA EF 49%  MAGNITUDE OF MR UNCHANGED. OTHER FINDINGS UNCHANGED.   FINDINGS DISCUSSED WITH DR Michele Chen

## 2021-11-19 NOTE — FLOWSHEET NOTE
Care Plan:  Continue spiritual and emotional care for patient and family. Including prayers. 11/19/21 0710   Encounter Summary   Services provided to: Patient and family together   Referral/Consult From: [facility] other; Family members  (sister and brother)   Continue Visiting Yes  (11/19 pre surgery)   Complexity of Encounter Moderate   Length of Encounter 15 minutes   Routine   Type Initial   Spiritual/Evangelical   Type Spiritual support   Pre surgery contact with prayer.

## 2021-11-19 NOTE — H&P
Cardiovascular Surgery History and Physical       HPI:   78year old female, no previous cardiovascular history, presents with recent diagnosis of aortic insufficiency prompted by new onset of peripheral edema. Patient additionally reports mild dyspnea on exertion; denies chest pain, orthopnea or presyncope.              Current Medication      Current Outpatient Medications:     furosemide (LASIX) 20 MG tablet, Take 1 tablet by mouth daily, Disp: 30 tablet, Rfl: 0    calcium carbonate (OSCAL) 500 MG TABS tablet, Take 500 mg by mouth daily, Disp: , Rfl:     ferrous sulfate (IRON 325) 325 (65 Fe) MG tablet, Take 325 mg by mouth 2 times daily , Disp: , Rfl:     potassium chloride (KLOR-CON M) 10 MEQ extended release tablet, Take 1 tablet by mouth daily, Disp: 30 tablet, Rfl: 0    Multiple Vitamins-Minerals (PRESERVISION AREDS PO), Take 2 tablets by mouth daily, Disp: , Rfl:     fluticasone (FLONASE) 50 MCG/ACT nasal spray, 1 spray by Each Nostril route daily, Disp: 2 Bottle, Rfl: 1    Multiple Vitamin TABS, Take by mouth daily, Disp: , Rfl:     Biotin 1000 MCG TABS, Take by mouth daily, Disp: , Rfl:         No Known Allergies     Past Medical History  Jean-Claude Ochoa  has a past medical history of Anemia and Valvular heart disease.     Social History  Jean-Claude Ochoa  reports that she quit smoking about 9 months ago. Her smoking use included cigarettes. She started smoking about 49 years ago. She has a 48.00 pack-year smoking history. She has never used smokeless tobacco. She reports current alcohol use.  She reports that she does not use drugs.     Family History  Jean-Claude Ochoa family history includes Cancer in her father; Kidney Disease in her mother.     There is no family history of bicuspid aortic valve, aneurysms, heart transplant, pacemakers, defibrillators, or sudden cardiac death.        Past Surgical History   Past Surgical History         Past Surgical History:   Procedure Laterality Date    HIP SURGERY   2012    TOTAL HIP ARTHROPLASTY   2012     RTh              Subjective:      Review of Systems   Constitutional: Positive for activity change and fatigue. HENT: Negative for congestion. Eyes: Negative for discharge. Respiratory: Positive for shortness of breath. Cardiovascular: Positive for leg swelling. Negative for chest pain and palpitations. Gastrointestinal: Negative for abdominal pain. Endocrine: Negative for cold intolerance. Genitourinary: Negative for difficulty urinating. Musculoskeletal: Negative for back pain. Skin: Negative for color change. Allergic/Immunologic: Negative for environmental allergies. Neurological: Negative for dizziness. Hematological: Negative for adenopathy. Psychiatric/Behavioral: Negative for agitation.         Objective:      BP (!) 100/45 (Site: Left Upper Arm, Position: Sitting, Cuff Size: Medium Adult)   Pulse 86   Ht 5' 4\" (1.626 m)   Wt 106 lb (48.1 kg)   BMI 18.19 kg/m²          Wt Readings from Last 3 Encounters:   10/26/21 106 lb (48.1 kg)   10/20/21 104 lb (47.2 kg)   10/11/21 104 lb 8 oz (47.4 kg)          BP Readings from Last 3 Encounters:   10/26/21 (!) 100/45   10/20/21 (!) 103/51   10/11/21 (!) 113/53         Physical Exam  Constitutional:       Appearance: Normal appearance. HENT:      Head: Normocephalic and atraumatic. Right Ear: Tympanic membrane, ear canal and external ear normal.      Left Ear: Tympanic membrane, ear canal and external ear normal.      Nose: Nose normal.      Mouth/Throat:      Mouth: Mucous membranes are dry. Eyes:      Extraocular Movements: Extraocular movements intact. Conjunctiva/sclera: Conjunctivae normal.      Pupils: Pupils are equal, round, and reactive to light. Neck:      Vascular: No carotid bruit. Cardiovascular:      Rate and Rhythm: Normal rate and regular rhythm. Heart sounds: Murmur heard.      Pulmonary:      Effort: Pulmonary effort is normal.      Breath sounds: Normal breath sounds. Abdominal:      General: Abdomen is flat. Palpations: Abdomen is soft. Musculoskeletal:         General: Swelling present. Normal range of motion. Cervical back: Normal range of motion and neck supple. Skin:     General: Skin is warm and dry. Capillary Refill: Capillary refill takes 2 to 3 seconds. Neurological:      General: No focal deficit present. Mental Status: She is alert and oriented to person, place, and time.    Psychiatric:         Mood and Affect: Mood normal.                  Lab Results   Component Value Date     WBC 2.6 10/20/2021     RBC 3.22 10/20/2021     HGB 10.3 10/20/2021     HCT 32.7 10/20/2021     .6 10/20/2021     MCH 32.0 10/20/2021     MCHC 31.5 10/20/2021     RDW 12.5 06/13/2012      10/20/2021     MPV 11.2 10/20/2021               Lab Results   Component Value Date      10/20/2021     K 4.1 10/20/2021      10/20/2021     CO2 26 10/20/2021     BUN 21 10/20/2021     LABALBU 4.1 06/23/2020     CREATININE 0.7 10/20/2021     CALCIUM 9.0 10/20/2021     LABGLOM 83 10/20/2021     GLUCOSE 98 10/20/2021               Lab Results   Component Value Date     ALKPHOS 71 06/23/2020     ALT 18 06/23/2020     AST 22 06/23/2020     PROT 6.3 06/23/2020     BILITOT 0.4 06/23/2020     BILIDIR <0.2 06/23/2020     LABALBU 4.1 06/23/2020               Lab Results   Component Value Date     MG 2.5 06/11/2012               Lab Results   Component Value Date     INR 1.07 10/20/2021     INR 0.97 06/11/2012     INR 0.96 06/11/2012                  Lab Results   Component Value Date     LABA1C 5.3 06/11/2012               Lab Results   Component Value Date     TRIG 30 10/20/2021     HDL 61 10/20/2021     LDLCALC 40 10/20/2021         No results found for: TSH      Testing Reviewed:       I have individually reviewed the images of the following tests:     CT chest: no calcification of ascending aorta; mild aneurysm of ascending aorta     Echocardiogram:Results for orders placed during the hospital encounter of 09/09/21     Echocardiogram complete     Narrative  Transthoracic Echocardiography Report (TTE)     Demographics     Patient Name    Amber Meade  Gender               Female  M     MR #            [id number]      Race                      Ethnicity     Account #       [de-identified]      Room Number     Accession       [id number]     Date of Study        09/09/2021  Number     Date of Birth   1955     Referring Physician  TEETEE Urbina     Age             78 year(s)     Sonographer          Darrell Trinh RDCS     Interpreting         Echo reader of the  Physician            week  Travis Shukla MD     Procedure     Type of Study     TTE procedure:ECHOCARDIOGRAM COMPLETE 2D W DOPPLER W COLOR.     Procedure Date  Date: 09/09/2021 Start: 10:12 AM     Study Location: Echo Lab  Technical Quality: Adequate visualization     Indications:Lower extremity edema and Heart murmur.     Additional Medical History: Former smoker, heart murmur     Patient Status: Routine     Height: 64 inches Weight: 108 pounds BSA: 1.51 m^2 BMI: 18.54 kg/m^2     BP: 122/58 mmHg     Conclusions     Summary  Ejection fraction is visually estimated at 50%. Overall left ventricular function is normal.  Aortic valve appears tricuspid. Thickened aortic valve leaflets noted. Aortic valve leaflets are Moderately calcified. Moderate-to-severe aortic regurgitation is noted. Mild aortic stenosis is present.     Signature     ----------------------------------------------------------------  Electronically signed by Travis Shukla MD (Interpreting  physician) on 09/09/2021 at 04:33 PM  ----------------------------------------------------------------     Findings     Mitral Valve  Mild mitral regurgitation is present.     Aortic Valve  Aortic valve appears tricuspid. Thickened aortic valve leaflets noted.   Aortic valve leaflets are Moderately mmHgLVOT  cm/s                mmHg                    Mean Gradient: 4 mmHg  MV E/A Ratio: 1.59  AV Mean Velocity: 135  MV Peak Gradient:   cm/s                    TV Peak E-Wave: 74.6 cm/s  3.52 mmHg           AV Mean Gradient: 8     TV Peak A-Wave: 42.4 cm/s  mmHg  MV Deceleration     AV VTI: 42 cm           TV Peak Gradient: 2.23 mmHg  Time: 201 msec      AV Area                 TR Velocity:271 cm/s  MV P1/2t: 59 msec   (Continuity):2.09 cm^2  TR Gradient:29.38 mmHg  MVA by PHT:3.73                             PV Peak Velocity: 55.7 cm/s  cm^2                LVOT VTI: 25.4 cm       PV Peak Gradient: 1.24 mmHg  AV P1/2t: 255 msec  MV E' Septal  Velocity: 11.9 cm/s  MV A' Septal  Velocity: 15.8 cm/s AV DVI (VTI): 0.6AV DVI  MV E' Lateral       (Vmax):0.72  Velocity: 12.1 cm/s  MV A' Lateral  Velocity: 7 cm/s  E/E' septal: 7.88  E/E' lateral: 7.75  MR Velocity: 404  cm/s     [id number]%3d        Left heart catheterization: no CAD     Assessment/Plan      78year old female with moderate-severe symptomatic aortic insufficiency. Plan bioprosthetic AVR via a minimally invasive approach, scheduled for 11/19. Patient will need dental clearance prior to valve replacement. The risks, benefits and alternatives were discussed in detail with the patient and family. The risks include bleeding, infection, graft failure, cardiac arrhythmias, thromboembolism, stroke, need for reoperation and death. The patient expressed understanding of these issues, confirmed that all questions were answered, and desires to proceed.

## 2021-11-19 NOTE — ANESTHESIA PRE PROCEDURE
Department of Anesthesiology  Preprocedure Note       Name:  Osiel Glaser   Age:  78 y.o.  :  1955                                          MRN:  [id number]         Date:  2021      Surgeon: Lonna Frankel):  Marya Ricketts MD    Procedure: Procedure(s): MINIMALLY INVASIVE TISSUE AVR    Medications prior to admission:   Prior to Admission medications    Medication Sig Start Date End Date Taking?  Authorizing Provider   furosemide (LASIX) 20 MG tablet Take 1 tablet by mouth daily 10/21/21  Yes NATALY Ortega CNP   calcium carbonate (OSCAL) 500 MG TABS tablet Take 500 mg by mouth daily   Yes Historical Provider, MD   ferrous sulfate (IRON 325) 325 (65 Fe) MG tablet Take 325 mg by mouth Daily with lunch    Yes Historical Provider, MD   Multiple Vitamins-Minerals (PRESERVISION AREDS PO) Take 2 tablets by mouth daily   Yes Historical Provider, MD   fluticasone (FLONASE) 50 MCG/ACT nasal spray 1 spray by Each Nostril route daily  Patient taking differently: 1 spray by Each Nostril route daily as needed  21  Yes NATALY Ortega CNP   Multiple Vitamin TABS Take by mouth daily   Yes Historical Provider, MD   Biotin 1000 MCG TABS Take by mouth daily   Yes Historical Provider, MD       Current medications:    Current Facility-Administered Medications   Medication Dose Route Frequency Provider Last Rate Last Admin    0.9 % sodium chloride infusion  25 mL IntraVENous PRN Elmira Mata PA-C        ceFAZolin (ANCEF) 2000 mg in dextrose 4 % 100 mL IVPB (premix)  2,000 mg [facility]VENous [facility]        chlorhexidine (HIBICLENS) 4 % liquid   Topical Once Elmira Mata PA-C        insulin (HumuLIN R) 100 units in sodium chloride 0.9% 100 mL infusion  1 Units/hr IntraVENous Continuous Elmira Mata PA-C        sodium chloride flush 0.9 % injection 10 mL  10 mL IntraVENous 2 times per day Elmira Mata PA-C        sodium chloride flush 0.9 % injection 10 mL  10 mL IntraVENous PRN Elmira Mata PA-C  vancomycin 1000 mg IVPB in 250 mL D5W addavial  1,000 mg IntraVENous On Call to [facility]        0.9 % sodium chloride infusion   IntraVENous Continuous Sandro Boyd  mL/hr at 21 New Bag at 21       Allergies:  No Known Allergies    Problem List:    Patient Active Problem List   Diagnosis Code    Stopped smoking between 1 and 3 months ago Z87.891    Family history of breast cancer in sister Z80.2    History of smoking 27 or more pack years Z87.891    Severe aortic insufficiency I35.1       Past Medical History:        Diagnosis Date    Anemia     COPD (chronic obstructive pulmonary disease) ([facility] [address]     Valvular heart disease 2021    Aortic Valve       Past Surgical History:        Procedure Laterality Date    FRACTURE SURGERY Left     OIO     HIP SURGERY Right     TOTAL HIP ARTHROPLASTY      RT         Social History:    Social History     Tobacco Use    Smoking status: Former Smoker     Packs/day: 1.00     Years: 48.00     Pack years: 48.00     Types: Cigarettes     Start date: 1972     Quit date: 3/17/2021     Years since quittin.6    Smokeless tobacco: Never Used   Substance Use Topics    Alcohol use: Yes     Comment: 4 beers daily                                Counseling given: Not Answered      Vital Signs (Current):   Vitals:    21 0549 21 0555   BP: (!) 133/56    Pulse: 92    Resp: 18    Temp: 96.9 °F (36.1 °C)    SpO2: 98%    Weight:  103 lb (46.7 kg)   Height:  5' 4\" (1.626 m)                                              BP Readings from Last 3 Encounters:   21 (!) 133/56   11/15/21 (!) 139/49   10/26/21 (!) 100/45       NPO Status: Time of last liquid consumption:                         Time of last solid consumption:                         Date of last liquid consumption: 21                        Date of last solid food consumption: 21    BMI:   Wt Readings from Last 3 Encounters:   11/19/21 103 lb (46.7 kg)   11/15/21 105 lb (47.6 kg)   10/26/21 106 lb (48.1 kg)     Body mass index is 17.68 kg/m². CBC:   Lab Results   Component Value Date    WBC 3.2 11/15/2021    RBC 3.50 11/15/2021    HGB 11.5 11/15/2021    HCT 36.0 11/15/2021    .9 11/15/2021    RDW 12.5 06/13/2012     11/15/2021       CMP:   Lab Results   Component Value Date     11/15/2021    K 4.8 11/15/2021    CL 99 11/15/2021    CO2 27 11/15/2021    BUN 19 11/15/2021    CREATININE 0.7 11/15/2021    LABGLOM 83 11/15/2021    GLUCOSE 99 11/15/2021    PROT 6.3 06/23/2020    CALCIUM 9.4 11/15/2021    BILITOT 0.4 06/23/2020    ALKPHOS 71 06/23/2020    AST 22 06/23/2020    ALT 18 06/23/2020       POC Tests: No results for input(s): POCGLU, POCNA, POCK, POCCL, POCBUN, POCHEMO, POCHCT in the last 72 hours. Coags:   Lab Results   Component Value Date    INR 1.07 11/15/2021    APTT 28.6 10/20/2021       HCG (If Applicable): No results found for: PREGTESTUR, PREGSERUM, HCG, HCGQUANT     ABGs: No results found for: PHART, PO2ART, ZZN2DZU, OIT1AWP, BEART, E2WAMLVX     Type & Screen (If Applicable):  Lab Results   Component Value Date    LABRH POS 11/15/2021       Drug/Infectious Status (If Applicable):  Lab Results   Component Value Date    HEPCAB Negative 06/23/2020       COVID-19 Screening (If Applicable): No results found for: COVID19        Anesthesia Evaluation    Airway: Mallampati: II  TM distance: >3 FB   Neck ROM: full  Mouth opening: > = 3 FB Dental:          Pulmonary: breath sounds clear to auscultation  (+) COPD:                             Cardiovascular:    (+) valvular problems/murmurs: AI,         Rhythm: regular                      Neuro/Psych:               GI/Hepatic/Renal:             Endo/Other:                     Abdominal:             Vascular: Other Findings:             Anesthesia Plan      general     ASA 3     (Pt.'s brother and sister along with pt.   Pt. Denies upper gi symptoms  Intra op kassandra risks benefits and alternates discussed pt. cnsnted. Intra op kassandra risks benefits and alternates discussed . Pt. Consented. Intra op kassandra was requested by dr Guru Ayers. A line,  central line and pa catheter risks benefits and alternates discussed. Pt. Consented. Plan ga, invasive monitoring intra op kassandra post op icu care and vent. Support.  )  Induction: intravenous. arterial line, BIS, central line, CVP, PA catheter and KASSANDRA  MIPS: Postoperative opioids intended, Prophylactic antiemetics administered and Postoperative ventilation. Anesthetic plan and risks discussed with patient and sibling. Use of blood products discussed with patient and sibling whom consented to blood products. Plan discussed with CRNA.                   Giovanni Dewey MD   11/19/2021

## 2021-11-19 NOTE — PROGRESS NOTES
Patient has been successfully weaned from Mechanical Ventilation. SpO2 of 98 on 40% FiO2. Patient extubated and placed on 3 liters/min via nasal cannula. Post extubation SpO2 is 100% with HR  96 bpm and RR 18 breaths/min. Patient had mild cough that was non-productive. Extubation Well tolerated by patient. Kev Barrett

## 2021-11-19 NOTE — ANESTHESIA PROCEDURE NOTES
Arterial Line:    An arterial line was placed using surface landmarks, in the OR for the following indication(s): continuous blood pressure monitoring and blood sampling needed. A 20 gauge (size), 1 and 1/4 inch (length), Arrow (type) catheter was placed, Seldinger technique used, into the left radial artery, secured by suture, tape and Tegaderm. Anesthesia type: General    Events:  patient tolerated procedure well with no complications and EBL < 5mL.   11/19/2021 8:20 AM11/19/2021 8:25 AM  Anesthesiologist: María Miller MD  Performed: Anesthesiologist   Preanesthetic Checklist  Completed: patient identified, IV checked, site marked, risks and benefits discussed, surgical consent, monitors and equipment checked, pre-op evaluation, timeout performed, anesthesia consent given, oxygen available and patient being monitored

## 2021-11-19 NOTE — OP NOTE
DATE: 11/19/21    PATIENT: Adele Cruz    MRN: [id number]     Acct: [de-identified]    PREOP DIAGNOSIS:   Aortic stenosis    Postop diagnosis:  Aortic stenosis    Procedure: Aortic valve replacement with a 25 mm [employer] bioprosthesis    SURGEON:  Chip Sherman MD    FIRST ASSISTANT OF MEDICAL NECESSITY: BERNARD Monsalve    DUE TO THE COMPLEXITY OF THE OPERATION, THE FIRST ASSISTANT WAS REQUIRED TO BE PRESENT THROUGHOUT THE ENTIRE OPERATION FOR RETRACTION IN THE CHEST      INDICATION:  The patient is a 78y.o. year-old female who presents with severe symptomatic aortic insufficiency. FINDNGS: A severe pericarditis was found, with extensive intrapericardial adhesions, rendering access to the SVC impossible for purposes of venous cannulation without an extensive adhesiolysis. Therefore, the initial mini-sternotomy approach was converted to a full sternotomy. The valve was tricuspid. The mean post-cardiopulmonary bypass prosthetic valve gradient was 8 mmHg. Heladio Chele CARDIOPULMONARY BYPASS TIME: 79    CROSSCLAMP TIME: 55    DESCRIPTION:  The patient was prepped and draped in sterile fashion in the supine position. A formal time-out was performed. Heparin was administered. A measured 8 cm midline incision was made extending from the sternomanubrial junction. An upper partial sternotomy was performed extending to the left 4th intercostal space. A retractor was placed. To our surprise, on opening the pericardium, a dense pericarditis was encountered, with extensive adhesions particularly of the right heart to the pericardium. In the course of the preliminary dissection, it became quickly obvious that a full sternotomy would be needed to complete an adhesiolysis of the right heart, and thus expose the venous anatomy for cannulation. The median sternotomy was completed. A laborious adhesiolysis of the medial aspect of the inferior wall, the right heart, and the ascending aorta was performed.  A pericardial

## 2021-11-19 NOTE — PROGRESS NOTES
ADMITTED TO [facility] AND ORIENTED TO UNIT. SCDS ON. FALL BANDS ON. PT VERBALIZED APPROVAL FOR FIRST NAME, LAST INITIAL AND PHYSICIAN NAME ON UNIT WHITEBOARD.

## 2021-11-19 NOTE — PROGRESS NOTES
1250 Patient arrived to unit from surgery via bed. Patient transferred to ICU bed and placed on continuous ICU bedside monitor. Patient admitted for Moderate to severe aortic valve regurgitation [I35.1]. Vitals obtained. See flowsheets. Patient's IV access includes Glynn Prescott LT RADIAL ,LT HAND,RFA. . Current infusions and rates of infusion include EPI AT 15 ML,AMICAR AT 50 ML. Assessment completed by [facility]. Two nurse skin assessment completed by [facility] and Lisa Zamudio. See flowsheets for assessment details. Policies and procedures of ICU unable to be explained to patient at this time. Family member(s)/representative(s) present at time of admission include SIBLINGS SIGNIFICANT OTHER. . Patient rights explained to family member(s)/representatives and patient, as able. Patient/patient's family member(s)/representative(s) N/A to have physician notified of their admission. All questions posed by patient's family member(s)/representative(s) and patient answered at this time.

## 2021-11-19 NOTE — ANESTHESIA PROCEDURE NOTES
Central Venous Line:    A central venous line was placed using ultrasound guidance, in the OR for the following indication(s): central venous access and CVP monitoring. 11/19/2021 8:30 AM11/19/2021 8:40 AM    Sterility preparation included the following: hand hygiene performed prior to procedure, maximum sterile barriers used and sterile technique used to drape from head to toe. The patient was placed in Trendelenburg position. The right internal jugular vein was prepped. The site was prepped with Chloraprep. A 9 Fr (size), 10 (length), introducer single lumen was placed. During the procedure, the following specific steps were taken: target vein identified, needle advanced into vein and blood aspirated and guidewire advanced into vein. Intravenous verification was obtained by venous blood return. Post insertion care included: all ports aspirated, all ports flushed easily, guidewire removed intact, Biopatch applied, line sutured in place and dressing applied. During the procedure the patient experienced: patient tolerated procedure well with no complications. Insertion site scrubbed per usage guidelines?: Yes  Skin prep agent dried for 3 minutes prior to procedure?:yes  Anesthesia type: general 7.5 (size) Pulmonary Artery Catheter (PAC) was placed through the Introducer CVL in the right internal jugular vein. The PAC placement was confirmed by pressure tracing changes and secured at 49 cm (depth). The patient experienced the following events during the procedure: patient tolerated procedure well with no complications. PA Cath placed?: Yes  Staffing  Performed: Anesthesiologist   Anesthesiologist: Mikie Casiano MD  Preanesthetic Checklist  Completed: patient identified, IV checked, site marked, risks and benefits discussed, surgical consent, monitors and equipment checked, pre-op evaluation, timeout performed, anesthesia consent given, oxygen available and patient being monitored

## 2021-11-19 NOTE — PLAN OF CARE
Problem: Discharge Planning:  Goal: Discharged to appropriate level of care  Description: Discharged to appropriate level of care  Outcome: Completed   SW consult received and completed. See  note 11/19.

## 2021-11-19 NOTE — PROGRESS NOTES
[facility] RESPIRATORY ASSESSMENT PROGRAM (RAP)  30 kg and over      Patient Name: Marcia Terry Room#: 3J-74/554-G : 1955     Admitting diagnosis:      ASSESSMENT     Vitals  Pulse: 96   Resp: 15  BP: (!) 85/43  SpO2: 100 %  Temp: 96.4 °F (35.8 °C)  Breath Sounds:clear  Comment: pt encouraged to do IS Q1wa times 10 breaths and cough      Inspiratory Capacity:   Preoperative/predictive value: 1750 ml   33% of value: 578 ml      75% of value: 1313 ml   10 ml/kg of IBW: 547 ml   Patient's Actual Inspiratory Capacity: 750 ml    CARE PLAN  All Care Plan selections must be based on the approved algorithms located on line in the Policy and Procedures under Respiratory Assessment Adult Protocol Handbook

## 2021-11-19 NOTE — CARE COORDINATION
DISCHARGE/PLANNING EVALUATION  11/19/21, 2:13 PM EST    Reason for Referral: discharge needs, home health  Mental Status: intubated  Decision Making:   Family/Social/Home Environment: lives at home with his radha   Current Services including food security, transportation and housekeeping: radha reports patient independent in her cares prior to admission  Current Equipment:none  Payment Source:[facility] Medicare  Concerns or Barriers to Discharge: none  Post acute provider list with quality measures, geographic area and applicable managed care information provided. Questions regarding selection process answered:provided HH list    Teach Back Method used with patient's radha regarding care plan and needs  Radha and siblings verbalize understanding of the plan of care and contribute to goal setting. Patient goals, treatment preferences and discharge plan: Return home with radha and home health for nursing. SW met pt's radha, sister, and brother. Pt currently intubated, staff working on extubating pt. Radha reports pt lives at home with him and their dog. Radha plans to stay with pt for a week after discharge and if needing to stay with her longer will do this, or sister will help with staying with pt. SW did discuss home health for nursing, provided [facility] to radha, he will visit with pt further on what agency she is wanting once she able to visit.      Electronically signed by Robert Obrien on 11/19/2021 at 2:13 PM

## 2021-11-20 ENCOUNTER — APPOINTMENT (OUTPATIENT)
Dept: GENERAL RADIOLOGY | Age: 66
DRG: 220 | End: 2021-11-20
Attending: THORACIC SURGERY (CARDIOTHORACIC VASCULAR SURGERY)
Payer: MEDICARE

## 2021-11-20 LAB
ANION GAP SERPL CALCULATED.3IONS-SCNC: 11 MEQ/L (ref 8–16)
BUN BLDV-MCNC: 16 MG/DL (ref 7–22)
CALCIUM IONIZED: 1.16 MMOL/L (ref 1.12–1.32)
CALCIUM SERPL-MCNC: 7.9 MG/DL (ref 8.5–10.5)
CHLORIDE BLD-SCNC: 103 MEQ/L (ref 98–111)
CO2: 20 MEQ/L (ref 23–33)
CREAT SERPL-MCNC: 0.7 MG/DL (ref 0.4–1.2)
ERYTHROCYTE [DISTWIDTH] IN BLOOD BY AUTOMATED COUNT: 19.6 % (ref 11.5–14.5)
ERYTHROCYTE [DISTWIDTH] IN BLOOD BY AUTOMATED COUNT: 69 FL (ref 35–45)
GFR SERPL CREATININE-BSD FRML MDRD: 83 ML/MIN/1.73M2
GLUCOSE BLD-MCNC: 101 MG/DL (ref 70–108)
GLUCOSE BLD-MCNC: 108 MG/DL (ref 70–108)
GLUCOSE BLD-MCNC: 125 MG/DL (ref 70–108)
GLUCOSE BLD-MCNC: 77 MG/DL (ref 70–108)
GLUCOSE BLD-MCNC: 86 MG/DL (ref 70–108)
GLUCOSE BLD-MCNC: 87 MG/DL (ref 70–108)
GLUCOSE BLD-MCNC: 92 MG/DL (ref 70–108)
GLUCOSE BLD-MCNC: 95 MG/DL (ref 70–108)
GLUCOSE BLD-MCNC: 95 MG/DL (ref 70–108)
GLUCOSE BLD-MCNC: 96 MG/DL (ref 70–108)
HCT VFR BLD CALC: 28.8 % (ref 37–47)
HEMOGLOBIN: 9.4 GM/DL (ref 12–16)
MAGNESIUM: 2.2 MG/DL (ref 1.6–2.4)
MCH RBC QN AUTO: 31.4 PG (ref 26–33)
MCHC RBC AUTO-ENTMCNC: 32.6 GM/DL (ref 32.2–35.5)
MCV RBC AUTO: 96.3 FL (ref 81–99)
PLATELET # BLD: 53 THOU/MM3 (ref 130–400)
PMV BLD AUTO: 10.6 FL (ref 9.4–12.4)
POTASSIUM SERPL-SCNC: 4.8 MEQ/L (ref 3.5–5.2)
POTASSIUM SERPL-SCNC: 5.2 MEQ/L (ref 3.5–5.2)
RBC # BLD: 2.99 MILL/MM3 (ref 4.2–5.4)
SODIUM BLD-SCNC: 134 MEQ/L (ref 135–145)
WBC # BLD: 4 THOU/MM3 (ref 4.8–10.8)

## 2021-11-20 PROCEDURE — 97116 GAIT TRAINING THERAPY: CPT

## 2021-11-20 PROCEDURE — 71045 X-RAY EXAM CHEST 1 VIEW: CPT

## 2021-11-20 PROCEDURE — 93005 ELECTROCARDIOGRAM TRACING: CPT | Performed by: THORACIC SURGERY (CARDIOTHORACIC VASCULAR SURGERY)

## 2021-11-20 PROCEDURE — 2580000003 HC RX 258: Performed by: THORACIC SURGERY (CARDIOTHORACIC VASCULAR SURGERY)

## 2021-11-20 PROCEDURE — 2060000000 HC ICU INTERMEDIATE R&B

## 2021-11-20 PROCEDURE — 6360000002 HC RX W HCPCS: Performed by: THORACIC SURGERY (CARDIOTHORACIC VASCULAR SURGERY)

## 2021-11-20 PROCEDURE — 82948 REAGENT STRIP/BLOOD GLUCOSE: CPT

## 2021-11-20 PROCEDURE — 36415 COLL VENOUS BLD VENIPUNCTURE: CPT

## 2021-11-20 PROCEDURE — 6370000000 HC RX 637 (ALT 250 FOR IP): Performed by: THORACIC SURGERY (CARDIOTHORACIC VASCULAR SURGERY)

## 2021-11-20 PROCEDURE — 85027 COMPLETE CBC AUTOMATED: CPT

## 2021-11-20 PROCEDURE — 97162 PT EVAL MOD COMPLEX 30 MIN: CPT

## 2021-11-20 PROCEDURE — 80048 BASIC METABOLIC PNL TOTAL CA: CPT

## 2021-11-20 PROCEDURE — 83735 ASSAY OF MAGNESIUM: CPT

## 2021-11-20 RX ORDER — FUROSEMIDE 10 MG/ML
40 INJECTION INTRAMUSCULAR; INTRAVENOUS 2 TIMES DAILY
Status: DISCONTINUED | OUTPATIENT
Start: 2021-11-20 | End: 2021-11-23

## 2021-11-20 RX ORDER — AMIODARONE HYDROCHLORIDE 200 MG/1
200 TABLET ORAL DAILY
Status: DISCONTINUED | OUTPATIENT
Start: 2021-11-21 | End: 2021-11-23 | Stop reason: HOSPADM

## 2021-11-20 RX ADMIN — FUROSEMIDE 40 MG: 40 INJECTION, SOLUTION INTRAMUSCULAR; INTRAVENOUS at 12:37

## 2021-11-20 RX ADMIN — Medication 1 TABLET: at 07:50

## 2021-11-20 RX ADMIN — METOPROLOL TARTRATE 25 MG: 25 TABLET, FILM COATED ORAL at 08:09

## 2021-11-20 RX ADMIN — ATORVASTATIN CALCIUM 20 MG: 20 TABLET, FILM COATED ORAL at 23:43

## 2021-11-20 RX ADMIN — OXYCODONE HYDROCHLORIDE 10 MG: 5 TABLET ORAL at 12:25

## 2021-11-20 RX ADMIN — CEFAZOLIN SODIUM 2000 MG: 2 INJECTION, SOLUTION INTRAVENOUS at 12:39

## 2021-11-20 RX ADMIN — OXYCODONE HYDROCHLORIDE 5 MG: 5 TABLET ORAL at 03:08

## 2021-11-20 RX ADMIN — OXYCODONE HYDROCHLORIDE 10 MG: 5 TABLET ORAL at 07:50

## 2021-11-20 RX ADMIN — AMIODARONE HYDROCHLORIDE 200 MG: 200 TABLET ORAL at 08:09

## 2021-11-20 RX ADMIN — SODIUM CHLORIDE, PRESERVATIVE FREE 10 ML: 5 INJECTION INTRAVENOUS at 08:09

## 2021-11-20 RX ADMIN — SODIUM CHLORIDE, PRESERVATIVE FREE 10 ML: 5 INJECTION INTRAVENOUS at 21:32

## 2021-11-20 RX ADMIN — CEFAZOLIN SODIUM 2000 MG: 2 INJECTION, SOLUTION INTRAVENOUS at 04:56

## 2021-11-20 RX ADMIN — FAMOTIDINE 20 MG: 20 TABLET, FILM COATED ORAL at 21:31

## 2021-11-20 RX ADMIN — CEFAZOLIN SODIUM 2000 MG: 2 INJECTION, SOLUTION INTRAVENOUS at 21:34

## 2021-11-20 RX ADMIN — FAMOTIDINE 20 MG: 20 TABLET, FILM COATED ORAL at 08:09

## 2021-11-20 RX ADMIN — OXYCODONE HYDROCHLORIDE 10 MG: 5 TABLET ORAL at 16:27

## 2021-11-20 RX ADMIN — FLUTICASONE PROPIONATE 1 SPRAY: 50 SPRAY, METERED NASAL at 08:05

## 2021-11-20 RX ADMIN — ASPIRIN 325 MG: 325 TABLET, COATED ORAL at 08:09

## 2021-11-20 RX ADMIN — FUROSEMIDE 40 MG: 40 INJECTION, SOLUTION INTRAMUSCULAR; INTRAVENOUS at 18:18

## 2021-11-20 RX ADMIN — BISACODYL 10 MG: 5 TABLET, COATED ORAL at 08:09

## 2021-11-20 RX ADMIN — VANCOMYCIN HYDROCHLORIDE 750 MG: 1 INJECTION, POWDER, LYOPHILIZED, FOR SOLUTION INTRAVENOUS at 08:14

## 2021-11-20 ASSESSMENT — PAIN SCALES - GENERAL
PAINLEVEL_OUTOF10: 0
PAINLEVEL_OUTOF10: 10
PAINLEVEL_OUTOF10: 9
PAINLEVEL_OUTOF10: 7
PAINLEVEL_OUTOF10: 10
PAINLEVEL_OUTOF10: 9
PAINLEVEL_OUTOF10: 7
PAINLEVEL_OUTOF10: 6

## 2021-11-20 ASSESSMENT — PAIN DESCRIPTION - FREQUENCY: FREQUENCY: CONTINUOUS

## 2021-11-20 ASSESSMENT — PAIN DESCRIPTION - PAIN TYPE: TYPE: SURGICAL PAIN

## 2021-11-20 ASSESSMENT — PAIN DESCRIPTION - LOCATION: LOCATION: CHEST

## 2021-11-20 ASSESSMENT — PAIN DESCRIPTION - DESCRIPTORS: DESCRIPTORS: SORE

## 2021-11-20 ASSESSMENT — PAIN DESCRIPTION - ONSET: ONSET: ON-GOING

## 2021-11-20 ASSESSMENT — PAIN DESCRIPTION - ORIENTATION: ORIENTATION: MID

## 2021-11-20 NOTE — PROGRESS NOTES
[address] [facility] PHYSICAL THERAPY  EVALUATION  [facility] ICU 4D - 4D-12/012-A    Time In: 8899  Time Out: 1122  Timed Code Treatment Minutes: 9 Minutes  Minutes: 17          Date: 2021  Patient Name: Maria Dolores Muhammad,  Gender:  female        MRN: [id number]  : 1955  (78 y.o.)      Referring Practitioner: Jean Cordero MD  Diagnosis: Moderate to severe aortic valve regurgitation  Additional Pertinent Hx: 78year old female, no previous cardiovascular history, presents with recent diagnosis of aortic insufficiency prompted by new onset of peripheral edema. Patient additionally reports mild dyspnea on exertion; denies chest pain, orthopnea or presyncope. Aortic valve replacement with a 25 mm [employer] Trifecta bioprosthesis on      Restrictions/Precautions:  Restrictions/Precautions: General Precautions, Fall Risk, Surgical Protocols  Position Activity Restriction  Sternal Precautions: No Pushing, No Pulling, 5# Lifting Restrictions    Subjective:  Chart Reviewed: Yes  Patient assessed for rehabilitation services?: Yes  Family / Caregiver Present: No  Subjective: RN approved session. Pt pleasant and agreeable to therapy. General:  Overall Orientation Status: Within Functional Limits  Follows Commands: Within Functional Limits    Vision: Within Functional Limits    Hearing: Within functional limits         Pain: does not rate but does have some discomfort at surgical site    Vitals: vitals monitored via continuous ICU monitor; remained WFL     Social/Functional History:    Lives With: Significant other  Type of Home: House  Home Layout: Two level  Home Access: Stairs to enter with rails  Entrance Stairs - Number of Steps: 2  Home Equipment:  (no AD)                   Ambulation Assistance: Independent  Transfer Assistance: Independent    Active :  Yes     Additional Comments: Pt bed/bath upstairs, plan is to go upstairs when home and stay upstairs for a few days while recovering. OBJECTIVE:  Range of Motion:  Bilateral Lower Extremity: WFL    Strength:  Bilateral Lower Extremity: Impaired - grossly deconditioned     Balance:  Static Sitting Balance:  Stand By Assistance  Static Standing Balance: Contact Guard Assistance    Bed Mobility:  Sit to Supine: Moderate Assistance, X 2, with verbal cues      Transfers:  Sit to Stand: Minimal Assistance, cues for hand placement  Stand to [place] [id number], cues for hand placement    Ambulation:  Contact Guard Assistance, with cues for safety  Distance: 12'   Surface: Level Tile  Device:No Device  Gait Deviations:  Slow Mari, Decreased Step Length Bilaterally and Decreased Gait Speed    Functional Outcome Measures: Completed  AM-PAC Inpatient Mobility Raw Score : 17  AM-PAC Inpatient T-Scale Score : 42.13    ASSESSMENT:  Activity Tolerance:  Patient tolerance of  treatment: good. Treatment Initiated: Treatment and education initiated within context of evaluation. Evaluation time included review of current medical information, gathering information related to past medical, social and functional history, completion of standardized testing, formal and informal observation of tasks, assessment of data and development of plan of care and goals. Treatment time included skilled education and facilitation of tasks to increase safety and independence with functional mobility for improved independence and quality of life. Assessment: Body structures, Functions, Activity limitations: Decreased functional mobility , Decreased balance, Decreased posture, Decreased endurance, Decreased strength  Assessment: Pt demonstrates a decrease in baseline by way of bed mobility, transfers and ambulation secondary to decreased activity tolerance, strength, fatigue, and balance deficits.  Pt will benefit from skilled PT services throughout admission and beyond hospital discharge for improvements in functional mobility and in order to decrease fall risk and return pt to PLOF. Prognosis: Good    REQUIRES PT FOLLOW UP: Yes    Discharge Recommendations:  Discharge Recommendations: Continue to assess pending progress, Home with assist PRN    Patient Education:  PT Education: Goals, PT Role, Plan of Care, Functional Mobility Training, Precautions, General Safety    Equipment Recommendations:  Equipment Needed: No    Plan:  Times per week: 6x CABG  Times per day: Daily  Current Treatment Recommendations: Strengthening, Balance Training, Endurance Training, Transfer Training, Functional Mobility Training, Stair training, Gait Training, Home Exercise Program, Safety Education & Training, Patient/Caregiver Education & Training, Equipment Evaluation, Education, & procurement    Goals:  Patient goals : none stated  Short term goals  Time Frame for Short term goals: by discharge  Short term goal 1: bed mobility with HOB flat, no rails, mod I for increased functional ind  Short term goal 2: sit<>stand from various surfaces with LRD mod I for safe transfers  Short term goal 3: ambulate 100' with LRD mod I for safe household ambulation  Short term goal 4: navigate 10 steps with LRD mod I for safe enter/exit of home and navigation of home  Long term goals  Time Frame for Long term goals : NA d/t short ELOS    Following session, patient left in safe position with all fall risk precautions in place.     Lilly Raphael PT, DPT

## 2021-11-20 NOTE — PROGRESS NOTES
1655: Patient transferred from ICU to 4k Bed 17. Admitted for Aortic Valve replacement. Patient alert and oriented x4 and on room air. IV located in left hand and right forearm. Chest tube attached to -20 suction. Vitals obtained and telemetry applied.      Two RN skin assessment: Samra SORIA and Lore SORIA

## 2021-11-20 NOTE — ANESTHESIA POSTPROCEDURE EVALUATION
Department of Anesthesiology  Postprocedure Note    Patient: Elisha Umaña  MRN: [id number]  YOB: 1955  Date of evaluation: 11/20/2021  Time:  7:33 AM     Procedure Summary     Date: 11/19/21 Room / Location: [address] DARRYL Dom[facility]nguez    Anesthesia Start: [facility] Anesthesia Stop: 5002    Procedure: AORTIC VALVE REPLACEMENT (N/A Chest) Diagnosis: (AORTIC VALVE REGURGITATIONAOR)    Surgeons: Latha Roe MD Responsible Provider: Ryan Ochoa MD    Anesthesia Type: general ASA Status: 3          Anesthesia Type: general    Elgin Phase I: Elgin Score: 10    Elgin Phase II:      Last vitals: Reviewed and per EMR flowsheets. Anesthesia Post Evaluation    Patient location during evaluation: ICU  Patient participation: complete - patient participated  Level of consciousness: awake and alert  Airway patency: patent  Nausea & Vomiting: no nausea and no vomiting  Cardiovascular status: blood pressure returned to baseline  Respiratory status: acceptable and nasal cannula  Hydration status: euvolemic  Comments: Pt up in chair.

## 2021-11-20 NOTE — PROGRESS NOTES
1171 removed a line from lt radial. Noted some bruising at site when dressing removed. Pressure held x10 minutes. No bleeding at site. Dry dressing applied. 0930 Priyank, significant other in to see pt.updated. 0935 removed swan concepcion catheter. Introducer remains in place. 0940 pacer wire grounded. 0945 weaned o2 to off.

## 2021-11-20 NOTE — PROGRESS NOTES
Cardiovascular Surgery Progress Note      Comfortable on RA  Good hemodynamics, no inotropes  EKG no acute changes  CXR no space issues  Labs ok  -Routine diuresis  -Transfer floor

## 2021-11-21 ENCOUNTER — APPOINTMENT (OUTPATIENT)
Dept: GENERAL RADIOLOGY | Age: 66
DRG: 220 | End: 2021-11-21
Attending: THORACIC SURGERY (CARDIOTHORACIC VASCULAR SURGERY)
Payer: MEDICARE

## 2021-11-21 LAB
ANION GAP SERPL CALCULATED.3IONS-SCNC: 9 MEQ/L (ref 8–16)
BUN BLDV-MCNC: 20 MG/DL (ref 7–22)
CALCIUM SERPL-MCNC: 8.2 MG/DL (ref 8.5–10.5)
CHLORIDE BLD-SCNC: 99 MEQ/L (ref 98–111)
CO2: 25 MEQ/L (ref 23–33)
CREAT SERPL-MCNC: 0.9 MG/DL (ref 0.4–1.2)
EKG ATRIAL RATE: 75 BPM
EKG ATRIAL RATE: 88 BPM
EKG P AXIS: 1 DEGREES
EKG P AXIS: 5 DEGREES
EKG P-R INTERVAL: 162 MS
EKG P-R INTERVAL: 180 MS
EKG Q-T INTERVAL: 352 MS
EKG Q-T INTERVAL: 390 MS
EKG QRS DURATION: 74 MS
EKG QRS DURATION: 76 MS
EKG QTC CALCULATION (BAZETT): 425 MS
EKG QTC CALCULATION (BAZETT): 435 MS
EKG R AXIS: 18 DEGREES
EKG R AXIS: 39 DEGREES
EKG T AXIS: 51 DEGREES
EKG T AXIS: 59 DEGREES
EKG VENTRICULAR RATE: 75 BPM
EKG VENTRICULAR RATE: 88 BPM
ERYTHROCYTE [DISTWIDTH] IN BLOOD BY AUTOMATED COUNT: 18.7 % (ref 11.5–14.5)
ERYTHROCYTE [DISTWIDTH] IN BLOOD BY AUTOMATED COUNT: 65.8 FL (ref 35–45)
GFR SERPL CREATININE-BSD FRML MDRD: 62 ML/MIN/1.73M2
GLUCOSE BLD-MCNC: 112 MG/DL (ref 70–108)
GLUCOSE BLD-MCNC: 121 MG/DL (ref 70–108)
HCT VFR BLD CALC: 31.1 % (ref 37–47)
HEMOGLOBIN: 10.1 GM/DL (ref 12–16)
MAGNESIUM: 1.8 MG/DL (ref 1.6–2.4)
MCH RBC QN AUTO: 31.1 PG (ref 26–33)
MCHC RBC AUTO-ENTMCNC: 32.5 GM/DL (ref 32.2–35.5)
MCV RBC AUTO: 95.7 FL (ref 81–99)
PLATELET # BLD: 52 THOU/MM3 (ref 130–400)
PMV BLD AUTO: 11.7 FL (ref 9.4–12.4)
POTASSIUM SERPL-SCNC: 4.1 MEQ/L (ref 3.5–5.2)
RBC # BLD: 3.25 MILL/MM3 (ref 4.2–5.4)
SODIUM BLD-SCNC: 133 MEQ/L (ref 135–145)
URINE CULTURE, ROUTINE: NORMAL
WBC # BLD: 3.2 THOU/MM3 (ref 4.8–10.8)

## 2021-11-21 PROCEDURE — 71045 X-RAY EXAM CHEST 1 VIEW: CPT

## 2021-11-21 PROCEDURE — 85027 COMPLETE CBC AUTOMATED: CPT

## 2021-11-21 PROCEDURE — 97166 OT EVAL MOD COMPLEX 45 MIN: CPT

## 2021-11-21 PROCEDURE — 2060000000 HC ICU INTERMEDIATE R&B

## 2021-11-21 PROCEDURE — 93010 ELECTROCARDIOGRAM REPORT: CPT | Performed by: NUCLEAR MEDICINE

## 2021-11-21 PROCEDURE — 80048 BASIC METABOLIC PNL TOTAL CA: CPT

## 2021-11-21 PROCEDURE — 2580000003 HC RX 258: Performed by: THORACIC SURGERY (CARDIOTHORACIC VASCULAR SURGERY)

## 2021-11-21 PROCEDURE — 82948 REAGENT STRIP/BLOOD GLUCOSE: CPT

## 2021-11-21 PROCEDURE — 97530 THERAPEUTIC ACTIVITIES: CPT

## 2021-11-21 PROCEDURE — 6370000000 HC RX 637 (ALT 250 FOR IP): Performed by: THORACIC SURGERY (CARDIOTHORACIC VASCULAR SURGERY)

## 2021-11-21 PROCEDURE — 36415 COLL VENOUS BLD VENIPUNCTURE: CPT

## 2021-11-21 PROCEDURE — 97110 THERAPEUTIC EXERCISES: CPT

## 2021-11-21 PROCEDURE — 6360000002 HC RX W HCPCS: Performed by: THORACIC SURGERY (CARDIOTHORACIC VASCULAR SURGERY)

## 2021-11-21 PROCEDURE — 93005 ELECTROCARDIOGRAM TRACING: CPT | Performed by: THORACIC SURGERY (CARDIOTHORACIC VASCULAR SURGERY)

## 2021-11-21 PROCEDURE — 83735 ASSAY OF MAGNESIUM: CPT

## 2021-11-21 RX ADMIN — FLUTICASONE PROPIONATE 1 SPRAY: 50 SPRAY, METERED NASAL at 07:53

## 2021-11-21 RX ADMIN — FUROSEMIDE 40 MG: 40 INJECTION, SOLUTION INTRAMUSCULAR; INTRAVENOUS at 07:53

## 2021-11-21 RX ADMIN — FAMOTIDINE 20 MG: 20 TABLET, FILM COATED ORAL at 07:51

## 2021-11-21 RX ADMIN — OXYCODONE HYDROCHLORIDE 5 MG: 5 TABLET ORAL at 07:51

## 2021-11-21 RX ADMIN — FUROSEMIDE 40 MG: 40 INJECTION, SOLUTION INTRAMUSCULAR; INTRAVENOUS at 18:12

## 2021-11-21 RX ADMIN — OXYCODONE HYDROCHLORIDE 5 MG: 5 TABLET ORAL at 21:29

## 2021-11-21 RX ADMIN — ASPIRIN 325 MG: 325 TABLET, COATED ORAL at 07:51

## 2021-11-21 RX ADMIN — ENOXAPARIN SODIUM 30 MG: 100 INJECTION SUBCUTANEOUS at 07:54

## 2021-11-21 RX ADMIN — SODIUM CHLORIDE, PRESERVATIVE FREE 10 ML: 5 INJECTION INTRAVENOUS at 08:04

## 2021-11-21 RX ADMIN — SODIUM CHLORIDE, PRESERVATIVE FREE 10 ML: 5 INJECTION INTRAVENOUS at 21:29

## 2021-11-21 RX ADMIN — CEFAZOLIN SODIUM 2000 MG: 2 INJECTION, SOLUTION INTRAVENOUS at 05:18

## 2021-11-21 RX ADMIN — Medication 1 TABLET: at 07:51

## 2021-11-21 RX ADMIN — FAMOTIDINE 20 MG: 20 TABLET, FILM COATED ORAL at 21:29

## 2021-11-21 RX ADMIN — ATORVASTATIN CALCIUM 20 MG: 20 TABLET, FILM COATED ORAL at 21:29

## 2021-11-21 RX ADMIN — AMIODARONE HYDROCHLORIDE 200 MG: 200 TABLET ORAL at 07:51

## 2021-11-21 ASSESSMENT — PAIN SCALES - GENERAL
PAINLEVEL_OUTOF10: 0
PAINLEVEL_OUTOF10: 0
PAINLEVEL_OUTOF10: 5
PAINLEVEL_OUTOF10: 5
PAINLEVEL_OUTOF10: 0
PAINLEVEL_OUTOF10: 6
PAINLEVEL_OUTOF10: 6

## 2021-11-21 ASSESSMENT — PAIN DESCRIPTION - PROGRESSION: CLINICAL_PROGRESSION: NOT CHANGED

## 2021-11-21 ASSESSMENT — PAIN DESCRIPTION - ONSET
ONSET: ON-GOING

## 2021-11-21 ASSESSMENT — PAIN DESCRIPTION - FREQUENCY
FREQUENCY: CONTINUOUS

## 2021-11-21 ASSESSMENT — PAIN DESCRIPTION - PAIN TYPE
TYPE: SURGICAL PAIN

## 2021-11-21 ASSESSMENT — PAIN DESCRIPTION - LOCATION
LOCATION: CHEST

## 2021-11-21 ASSESSMENT — PAIN DESCRIPTION - ORIENTATION
ORIENTATION: MID

## 2021-11-21 ASSESSMENT — PAIN DESCRIPTION - DESCRIPTORS
DESCRIPTORS: SORE

## 2021-11-21 ASSESSMENT — PAIN - FUNCTIONAL ASSESSMENT: PAIN_FUNCTIONAL_ASSESSMENT: ACTIVITIES ARE NOT PREVENTED

## 2021-11-21 NOTE — PROGRESS NOTES
[facility] ICU STEPDOWN TELEMETRY 4K  EVALUATION    Time:   Time In: 6464  Time Out: 6891  Timed Code Treatment Minutes: 23 Minutes  Minutes: 31          Date: 2021  Patient Name: Ronnie Kyle,   Gender: female      MRN: [id number]  : 1955  (78 y.o.)  Referring Practitioner: Con Jeffery MD     Additional Pertinent Hx: 78year old female, no previous cardiovascular history, presents with recent diagnosis of aortic insufficiency prompted by new onset of peripheral edema. Patient additionally reports mild dyspnea on exertion; denies chest pain, orthopnea or presyncope. Aortic valve replacement with a 25 mm [employer] Trifecta bioprosthesis on     Restrictions/Precautions:  Restrictions/Precautions: General Precautions, Fall Risk, Surgical Protocols  Position Activity Restriction  Sternal Precautions: No Pushing, No Pulling, 10# Lifting Restrictions  Other position/activity restrictions: 1 chest tube    Subjective  Chart Reviewed: Yes, Orders, Progress Notes, History and Physical, Operative Notes  Patient assessed for rehabilitation services?: Yes  Response to previous treatment: Patient with no complaints from previous session  Family / Caregiver Present: No    Subjective: RN approved of OT session. pt sitting in recliner on arrival and agreeable to OT. Able to identify 1/3 sternal precautions.      Pain:  Pain Assessment  Patient Currently in Pain: Yes  Pain Level: 6    Vitals: Oxygen: on RA 96%   Heart Rate: ranged between  bpm    Social/Functional History:  Lives With: Significant other  Type of Home: House  Home Layout: Two level  Home Access: Stairs to enter with rails  Entrance Stairs - Number of Steps: 2  Home Equipment:  (no AD)   Bathroom Shower/Tub: Tub/Shower unit  Bathroom Toilet: Standard  Bathroom Equipment: Grab bars in shower  Bathroom Accessibility: Accessible       ADL Assistance: Independent  Homemaking Assistance: Independent  Homemaking Responsibilities: Yes  Ambulation Assistance: Independent  Transfer Assistance: Independent    Active : Yes  Occupation: Part time employment  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 VISION:Corrected    HEARING:  WFL    COGNITION: Slow Processing, Decreased Recall and Decreased Problem Solving    RANGE OF MOTION:  Bilateral Upper Extremity:  WFL    STRENGTH:  Bilateral Upper Extremity:  Not tested d/t sternal precautions     SENSATION:   WFL    ADL:   No ADLs completed this session d/t pt denying completing any this AM.     BALANCE:  Standing Balance: Stand By Assistance. with 1-2 UE support on RW     BED MOBILITY:  Not Tested    TRANSFERS:  Sit to Stand:  Stand By Assistance, X 1, with increased time for completion. 1 vc required to maintain sternal precautions   Stand to Sit: Stand By Assistance, X 1, with increased time for completion. 1 vc required to maintain sternal precautions     FUNCTIONAL MOBILITY:  Assistive Device: Rolling Walker  Assist Level:  Stand By Assistance, X 1 and with increased time for completion. Distance: short distance in hallway   1 vc for upright posture and to avoid leaning on RW to maintain sternal precautions. Exercise:  Pt completed CABG step II exercises while seated in bedside chair. Pt completed 1 set/s  X 10-12 repetitions with short rest breaks in between exercises. and All exercises completed to increase strength and endurance required for ADLs. Activity Tolerance:  Patient tolerance of  treatment: good. Tolerated session well, motivated to increase indep and return to PLOF       Assessment:  Assessment: pt requires assistance with ADLs, transfers, and functional mobility compared to  PLOF.  Pt would benefit from skilled OT services to address these tasks, in addition to strength and endurance to return to PLOF. Performance deficits / Impairments: Decreased functional mobility , Decreased ADL status, Decreased strength, Decreased endurance, Decreased high-level IADLs  Prognosis: Good  REQUIRES OT FOLLOW UP: Yes  Decision Making: Medium Complexity    Treatment Initiated: Treatment and education initiated within context of evaluation. Evaluation time included review of current medical information, gathering information related to past medical, social and functional history, completion of standardized testing, formal and informal observation of tasks, assessment of data and development of plan of care and goals. Treatment time included skilled education and facilitation of tasks to increase safety and independence with ADL's for improved functional independence and quality of life. Discharge Recommendations:  Home with Home health OT    Patient Education:  OT Education: OT Role, Plan of Care, Home Exercise Program, Precautions, ADL Adaptive Strategies, Transfer Training, Energy Conservation    Equipment Recommendations:  Equipment Needed: No    Plan:  Times per week: 6x  Times per day: Daily  Current Treatment Recommendations: Strengthening, Patient/Caregiver Education & Training, Home Management Training, Functional Mobility Training, Endurance Training, Safety Education & Training, Self-Care / ADL, Equipment Evaluation, Education, & procurement. See long-term goal time frame for expected duration of plan of care. If no long-term goals established, a short length of stay is anticipated.     Goals:  Patient goals : return home  Short term goals  Time Frame for Short term goals: by discharge  Short term goal 1: Pt will complete functional mobility within [place] distances mod-I to access ADLs  Short term goal 2: Pt will complete BADL routine mod-I to increase indep in completing self care tasks  Short term goal 3: Pt will complete functional transfers mod-I without requiring verbal prompts to maintain sternal precautions to complete needed ADLs  Short term goal 4: pt will complete dynamic standing tasks mod-I with 1-2 UE release for x6 minutes to increase endurance to perform grooming tasks  Short term goal 5: Pt will complete CABG stage 2 exercises x15 reps x2 sets with no > than 1 vc for technique to increase endurance to complete needed ADLs. Following session, patient left in safe position with all fall risk precautions in place.

## 2021-11-21 NOTE — PROGRESS NOTES
Cardiovascular Surgery Progress Note    Afebrile, vitals stable, on room air  CXR with small Rt effusion and atelectasis  Platelets 52, all other labs are normal   - continue diuresis   - remove chest tubes   - encourage activity and I/S    CAMERON DRAPER, DO

## 2021-11-21 NOTE — PROGRESS NOTES
56845: Chest tubes and pacer wires removed at this time by Dr. Ronnell Mart. Patient tolerated well with a little discomfort. 1609: [place] catheter removed at this time.

## 2021-11-22 ENCOUNTER — APPOINTMENT (OUTPATIENT)
Dept: GENERAL RADIOLOGY | Age: 66
DRG: 220 | End: 2021-11-22
Attending: THORACIC SURGERY (CARDIOTHORACIC VASCULAR SURGERY)
Payer: MEDICARE

## 2021-11-22 LAB
ANION GAP SERPL CALCULATED.3IONS-SCNC: 13 MEQ/L (ref 8–16)
BUN BLDV-MCNC: 26 MG/DL (ref 7–22)
CALCIUM SERPL-MCNC: 8.3 MG/DL (ref 8.5–10.5)
CHLORIDE BLD-SCNC: 100 MEQ/L (ref 98–111)
CO2: 25 MEQ/L (ref 23–33)
CREAT SERPL-MCNC: 0.9 MG/DL (ref 0.4–1.2)
ERYTHROCYTE [DISTWIDTH] IN BLOOD BY AUTOMATED COUNT: 17.9 % (ref 11.5–14.5)
ERYTHROCYTE [DISTWIDTH] IN BLOOD BY AUTOMATED COUNT: 64.5 FL (ref 35–45)
GFR SERPL CREATININE-BSD FRML MDRD: 62 ML/MIN/1.73M2
GLUCOSE BLD-MCNC: 107 MG/DL (ref 70–108)
GLUCOSE BLD-MCNC: 107 MG/DL (ref 70–108)
GLUCOSE BLD-MCNC: 91 MG/DL (ref 70–108)
GLUCOSE BLD-MCNC: 94 MG/DL (ref 70–108)
HCT VFR BLD CALC: 30.7 % (ref 37–47)
HEMOGLOBIN: 9.8 GM/DL (ref 12–16)
MAGNESIUM: 1.8 MG/DL (ref 1.6–2.4)
MCH RBC QN AUTO: 31 PG (ref 26–33)
MCHC RBC AUTO-ENTMCNC: 31.9 GM/DL (ref 32.2–35.5)
MCV RBC AUTO: 97.2 FL (ref 81–99)
PLATELET # BLD: 56 THOU/MM3 (ref 130–400)
PMV BLD AUTO: 11.6 FL (ref 9.4–12.4)
POTASSIUM SERPL-SCNC: 3.5 MEQ/L (ref 3.5–5.2)
RBC # BLD: 3.16 MILL/MM3 (ref 4.2–5.4)
SODIUM BLD-SCNC: 138 MEQ/L (ref 135–145)
WBC # BLD: 2.8 THOU/MM3 (ref 4.8–10.8)

## 2021-11-22 PROCEDURE — 6360000002 HC RX W HCPCS: Performed by: THORACIC SURGERY (CARDIOTHORACIC VASCULAR SURGERY)

## 2021-11-22 PROCEDURE — 71046 X-RAY EXAM CHEST 2 VIEWS: CPT

## 2021-11-22 PROCEDURE — 2060000000 HC ICU INTERMEDIATE R&B

## 2021-11-22 PROCEDURE — 83735 ASSAY OF MAGNESIUM: CPT

## 2021-11-22 PROCEDURE — 2580000003 HC RX 258: Performed by: THORACIC SURGERY (CARDIOTHORACIC VASCULAR SURGERY)

## 2021-11-22 PROCEDURE — 6370000000 HC RX 637 (ALT 250 FOR IP): Performed by: THORACIC SURGERY (CARDIOTHORACIC VASCULAR SURGERY)

## 2021-11-22 PROCEDURE — 97530 THERAPEUTIC ACTIVITIES: CPT

## 2021-11-22 PROCEDURE — 85027 COMPLETE CBC AUTOMATED: CPT

## 2021-11-22 PROCEDURE — 80048 BASIC METABOLIC PNL TOTAL CA: CPT

## 2021-11-22 PROCEDURE — 97110 THERAPEUTIC EXERCISES: CPT

## 2021-11-22 PROCEDURE — 82948 REAGENT STRIP/BLOOD GLUCOSE: CPT

## 2021-11-22 PROCEDURE — 6370000000 HC RX 637 (ALT 250 FOR IP): Performed by: PHYSICIAN ASSISTANT

## 2021-11-22 PROCEDURE — 93005 ELECTROCARDIOGRAM TRACING: CPT | Performed by: THORACIC SURGERY (CARDIOTHORACIC VASCULAR SURGERY)

## 2021-11-22 PROCEDURE — APPSS30 APP SPLIT SHARED TIME 16-30 MINUTES: Performed by: PHYSICIAN ASSISTANT

## 2021-11-22 PROCEDURE — 36415 COLL VENOUS BLD VENIPUNCTURE: CPT

## 2021-11-22 PROCEDURE — 97535 SELF CARE MNGMENT TRAINING: CPT

## 2021-11-22 PROCEDURE — 97116 GAIT TRAINING THERAPY: CPT

## 2021-11-22 RX ORDER — FERROUS SULFATE 325(65) MG
325 TABLET ORAL
Status: DISCONTINUED | OUTPATIENT
Start: 2021-11-22 | End: 2021-11-23 | Stop reason: HOSPADM

## 2021-11-22 RX ADMIN — FUROSEMIDE 40 MG: 40 INJECTION, SOLUTION INTRAMUSCULAR; INTRAVENOUS at 08:29

## 2021-11-22 RX ADMIN — FUROSEMIDE 40 MG: 40 INJECTION, SOLUTION INTRAMUSCULAR; INTRAVENOUS at 18:12

## 2021-11-22 RX ADMIN — SODIUM CHLORIDE, PRESERVATIVE FREE 10 ML: 5 INJECTION INTRAVENOUS at 21:00

## 2021-11-22 RX ADMIN — ATORVASTATIN CALCIUM 20 MG: 20 TABLET, FILM COATED ORAL at 21:00

## 2021-11-22 RX ADMIN — FAMOTIDINE 20 MG: 20 TABLET, FILM COATED ORAL at 21:00

## 2021-11-22 RX ADMIN — FAMOTIDINE 20 MG: 20 TABLET, FILM COATED ORAL at 08:29

## 2021-11-22 RX ADMIN — SODIUM CHLORIDE, PRESERVATIVE FREE 10 ML: 5 INJECTION INTRAVENOUS at 08:28

## 2021-11-22 RX ADMIN — FLUTICASONE PROPIONATE 1 SPRAY: 50 SPRAY, METERED NASAL at 08:29

## 2021-11-22 RX ADMIN — AMIODARONE HYDROCHLORIDE 200 MG: 200 TABLET ORAL at 08:29

## 2021-11-22 RX ADMIN — Medication 1 TABLET: at 08:29

## 2021-11-22 RX ADMIN — FERROUS SULFATE TAB 325 MG (65 MG ELEMENTAL FE) 325 MG: 325 (65 FE) TAB at 14:16

## 2021-11-22 ASSESSMENT — PAIN SCALES - GENERAL
PAINLEVEL_OUTOF10: 0

## 2021-11-22 NOTE — CARE COORDINATION
11/22/21, 11:47 AM EST    DISCHARGE PLANNING EVALUATION    SW spoke with pt to confirm discharge plan, pt states she will return home with S.O. and new [facility] HH. MONSERRAT spoke with Glenda Lange at [place] [id number] for referral. Anticipate discharge tomorrow.

## 2021-11-22 NOTE — PROGRESS NOTES
CT/CV Surgery Progress Note    2021 8:08 AM  Surgeon:  Dr. Newton Knife     Procedure: AVR #25 SJM Trifecta   POD #3    Subjective: Ms. Isabella Haider is resting comfortably in bed, alert, and in no acute distress. Pt denies chest pressure, SOB, fever,chills, N/V/D. Pt had BM and no difficulty walking around the unit. I/O last 3 completed shifts: In: 543.9 [P.O.:370; IV Piggyback:173.9]  Out: 7263 [Urine:1200; Chest Tube:64]    Vital Signs: BP (!) 125/58   Pulse 97   Temp 98.1 °F (36.7 °C) (Oral)   Resp 18   Ht 5' 4\" (1.626 m)   Wt 105 lb 8 oz (47.9 kg)   SpO2 93%   BMI 18.11 kg/m²    Temp (24hrs), Av.9 °F (36.6 °C), Min:97.5 °F (36.4 °C), Max:98.2 °F (36.8 °C)    Labs:   CBC: Recent Labs     21  0500 21  0708 21  0549   WBC 4.0* 3.2* 2.8*   HGB 9.4* 10.1* 9.8*   HCT 28.8* 31.1* 30.7*   MCV 96.3 95.7 97.2   PLT 53* 52* 56*     BMP:   Recent Labs     21  0500 21  0535 21  0708 21  0549 21  0751   *  --  133*  --  138  --    K 4.8  --  4.1  --  3.5  --      --  99  --  100  --    CO2 20*  --  25  --  25  --    BUN 16  --  20  --  26*  --    CREATININE 0.7  --  0.9  --  0.9  --    MG 2.2  --  1.8  --  1.8  --    POCGLU  --    < >  --    < >  --  107    < > = values in this interval not displayed. Imaging:  Chest X-Ray: 2021   Small bilateral pleural effusions with mild bibasilar atelectasis.         Intake/Output Summary (Last 24 hours) at 2021 0808  Last data filed at 2021 0520  Gross per 24 hour   Intake 370 ml   Output 1264 ml   Net -894 ml     Scheduled Meds:    amiodarone  200 mg Oral Daily    furosemide  40 mg IntraVENous BID    fluticasone  1 spray Each Nostril Daily    sodium chloride flush  10 mL IntraVENous 2 times per day    enoxaparin  30 mg SubCUTAneous Daily    aspirin  325 mg Oral Daily    multivitamin  1 tablet Oral Daily with breakfast    bisacodyl  10 mg Oral BID    atorvastatin  20 mg Oral Nightly    famotidine  20 mg Oral BID    calcium replacement protocol   Other RX Placeholder     ROS: All neg unless specifically mentioned in subjective section. Exam:  General Appearance: alert ,conversing, in no acute distress  Cardiovascular: normal rate, regular rhythm, normal S1 and S2, no murmurs, rubs, clicks, or gallops  Pulmonary/Chest: bilateral basilar crackles   Neurological: alert, oriented, normal speech, no focal findings or movement disorder noted  Sternum: Incision healing appropriately and no wound dehiscence noted. Assessment:   Patient Active Problem List   Diagnosis    Stopped smoking between 1 and 3 months ago    Family history of breast cancer in sister    History of smoking 27 or more pack years    Severe aortic insufficiency    Moderate to severe aortic valve regurgitation    S/P AVR     Plan:   1. CXR reviewed- Continue daily CXR's   2. Continue diuresis and restart home Iron. Hold ASA per protocol and stop Lovenox. Pt will continue to follow with her Oncologist/Hematologist as an OP  3.  Continue current therapy    The plan of care was discussed in detail with Dr. Nena Sanches    Electronically signed by Minesh Cruz PA-C on 11/22/2021 at 8:08 AM

## 2021-11-22 NOTE — PROGRESS NOTES
out of bed to return home safely. Pt also requesting to not use an AD during amb to trial.     PAIN: denies    Vitals: Vitals not assessed per clinical judgement, see nursing flowsheet  Nurse checked vitals prior to session    OBJECTIVE:  Bed Mobility:  Rolling to Right: Stand By Assistance, X 1, with head of bed flat, without rail, with verbal cues , with increased time for completion   Supine to Sit: Contact Guard Assistance, Minimal Assistance, X 1, with head of bed flat, without rail, with verbal cues , with increased time for completion  Sit to Supine: Contact Guard Assistance, Minimal Assistance, X 1, with head of bed flat, without rail, with verbal cues , with increased time for completion   Pt completed x2 trials of bed mobility tasks with education on log roll technique with good demo. Pt required cues for proper technique of completion with good demo noted. Transfers:  Sit to Stand: Stand By Assistance, X 1, cues for hand placement, with verbal cues  Stand to Sit:Stand By Assistance, X 1, cues for hand placement, with verbal cues  No AD required with mobility. Pt completed transfers from various surfaces and heights with good safety noted. Pt required cues for holding bear to chest during mobility with good demo. Ambulation:  Stand By Assistance, [employer], X 1, with cues for safety, with verbal cues   Distance: 300 feet, 12 feet  Surface: Level Tile  Device:No Device  Gait Deviations: Forward Flexed Posture, Slow Mari, Decreased Step Length Bilaterally, Decreased Gait Speed, Unsteady Gait and antalgic gait pattern noted with cues for safety. Pt amb with 1 LOB due to increased distraction during session with CGA-min A for correction and cues for safety.    Stairs:  Stand By Assistance, [employer], X 1, with cues for safety, with verbal cues   Number of Steps: 4  Height: 6\" step with Bilateral Handrails    Balance:  Static Sitting Balance:  Independent  Dynamic Sitting Balance: Supervision  Static Standing Balance: Stand By Assistance  reciprocal step pattern with ascend and descend with no LOB noted with cues for safety and proper sequencing with good demo. Pt completed seated jimy care with no LOB noted with good safety. Exercise:  Patient was guided in 1 set(s) 15 reps of exercise to both lower extremities. Lower trunk rotations, Shoulder horizontal abduction/adduction, Shoulder rolls, Seated marches, Seated heel/toe raises, Long arc quads and Seated abduction/adduction. Exercises were completed for increased independence with functional mobility. Pt required VC and visual cues for proper technique of exercises for completion with good demo. Functional Outcome Measures: Completed  -PAC Inpatient Mobility Raw Score : 21  -PAC Inpatient T-Scale Score : 50.25    ASSESSMENT:  Assessment: Patient progressing toward established goals. Activity Tolerance:  Patient tolerance of  treatment: good.      Equipment Recommendations:Equipment Needed: No  Discharge Recommendations: Continue to assess pending progress, Home with Home Health PT and Patient would benefit from continued PT at discharge  Plan: Times per week: 6x CABG  Times per day: Daily  Current Treatment Recommendations: Strengthening, Balance Training, Endurance Training, Transfer Training, Functional Mobility Training, Stair training, Gait Training, Home Exercise Program, Safety Education & Training, Patient/Caregiver Education & Training, Equipment Evaluation, Education, & procurement    Patient Education  Patient Education: Plan of Care, Home Exercise Program, Precautions/Restrictions, [employer] Mobility, Equipment Education, Transfers, Gait, Stairs, Use of Gait [place], Verbal Exercise Instruction,  - Patient Verbalized Understanding, - Patient Requires Continued Education    Goals:  Patient goals : none stated  Short term goals  Time Frame for Short term goals: by discharge  Short term goal 1: bed mobility with HOB flat, no rails, mod I for increased functional ind  Short term goal 2: sit<>stand from various surfaces with LRD mod I for safe transfers  Short term goal 3: ambulate [place]  1560' with LRD mod I for safe household ambulation  Short term goal 4: navigate 10 steps with LRD mod I for safe enter/exit of home and navigation of home  Long term goals  Time Frame for Long term goals : NA d/t short ELOS    Following session, patient left in safe position with all fall risk precautions in place. Pt left in bedside chair with all needs and call light in reach with chair alarm on.

## 2021-11-22 NOTE — PROGRESS NOTES
99 [facility] ICU STEPDOWN TELEMETRY 4K  Occupational Therapy  Daily Note  Time:   Time In: 1440  Time Out: 5877  Timed Code Treatment Minutes: 23 Minutes  Minutes: 23          Date: 2021  Patient Name: Terry Cui,   Gender: female      Room: [facility]17/017-A  MRN: [id number]  : 1955  (78 y.o.)  Referring Practitioner: Nancy Mayer MD     Additional Pertinent Hx: 78year old female, no previous cardiovascular history, presents with recent diagnosis of aortic insufficiency prompted by new onset of peripheral edema. Patient additionally reports mild dyspnea on exertion; denies chest pain, orthopnea or presyncope. Aortic valve replacement with a 25 mm [employer] Trifecta bioprosthesis on     Restrictions/Precautions:  Restrictions/Precautions: General Precautions, Fall Risk, Surgical Protocols  Position Activity Restriction  Sternal Precautions: No Pushing, No Pulling, 10# Lifting Restrictions  Other position/activity restrictions: 1 chest tube- pulled      SUBJECTIVE: Pt seated in bedside upon arrival, agreeable to OT session. PAIN: Denies    Vitals: Oxygen: 91% on room air  Heart Rate: 113 bpm    COGNITION: Slow Processing and Decreased Safety Awareness    ADL:   Grooming: Stand By Assistance. Standing sink side washing hands. Lower Extremity Dressing: Stand By Assistance. Adjusting slippers socks using cross leg tech. Toileting: Stand By Assistance. For hygiene and clothing management afer void. Toilet Transfer: Stand By Assistance. STS. BALANCE:  Sitting Balance:  Modified Independent. Bedside chair  Standing Balance: Stand By Assistance. x2 minutes within grooming task. BED MOBILITY:  Not Tested    TRANSFERS:  Sit to Stand:  Stand By Assistance. Stand to Sit: Stand By Assistance. Comment: To/from bedside chair- good adherence to sternal precautions    FUNCTIONAL MOBILITY:  Assistive Device: Rolling Walker   Assist Level:  Stand By Assistance. patient left in safe position with all fall risk precautions in place.

## 2021-11-22 NOTE — PROGRESS NOTES
Inpatient Cardiac Rehabilitation Consult    Received consult for Phase II Cardiac Rehabilitation. Patient needs cardiac rehab due to AVR on 11/19/21. Importance of Cardiac Rehab discussed with patient. Reviewed cardiac rehab class times. Patient questions answered. We will contact patient at home to schedule evaluation appointment. Patient would like for insurance to be checked first. Cardiac Rehab brochure given.

## 2021-11-22 NOTE — PROGRESS NOTES
Ariane Salazar was evaluated today for a wheeled walker and she requires this to successfully complete daily living tasks of grooming, bathing, toileting and ambulation. A wheeled walker is necessary due to the patient's unsteady gait, upper & lower body weakness, and inability to  an ambulation device; and she can ambulate only by pushing a walker instead of a lesser assistive device such as a cane, crutch, or standard walker. The need for this equipment was discussed with the patient and she understands and is in agreement.     Darryl Bauer PT, DPT

## 2021-11-22 NOTE — PROGRESS NOTES
Comprehensive Nutrition Assessment    Type and Reason for Visit:  Initial, Consult, Patient Education (ileus prevention protocol, heart healthy diet education, s/p aortic valve replacement)    Nutrition Recommendations/Plan:   -Continue MVI. -ONS initiated: Ensure Enlive TID.  -Post-op ileus prevention protocol-activia, decaf hot beverage TID. -Continue current diet. Nutrition Assessment:    Pt. moderately malnourished AEB criteria as listed below. At risk for further nutrition compromise r/t admit with moderate to severe regurgitation, s/p aortic valve replacement 11/19/21, dislike of hospital food, low BMI, increase nutrient needs to support post-op healing,  and underlying medical condition (hx: anemia, valvular heart disease, COPD, smoking). Nutrition recommendations/interventions as per above. Malnutrition Assessment:  Malnutrition Status: Moderate malnutrition    Context:  Chronic Illness     Findings of the 6 clinical characteristics of malnutrition:  Energy Intake:  7 - 75% or less estimated energy requirements for 1 month or longer  Weight Loss:  No significant weight loss     Body Fat Loss:   (Moderate body fat loss) Orbital, Triceps, Fat Overlying Ribs   Muscle Mass Loss:   (Moderate Muscle Mass loss) Temples (temporalis), Clavicles (pectoralis & deltoids), Thigh (quadraceps)  Fluid Accumulation:  No significant fluid accumulation     Strength:  Not Performed    Estimated Daily Nutrient Needs:  Energy (kcal):  4463-6110 kcals (30-35 kcals/kg/day); Weight Used for Energy Requirements:   (48 kg)     Protein (g):  72 grams or more (1.5 grams/kg/day); Weight Used for Protein Requirements:   (48 kg)        Fluid (ml/day):  2000 ml fluid restriction per MD;     Nutrition Related Findings:   Patient seen, reports she has lost weight over the years, not eating as much. Dislikes hospital foods, feels she will eat better once she is at home. Has been eating Colectica Darner, has at home too.   Radha does most of cooking and she states he knows she isn't supposed to have much sodium. She was agreeable to ONS. 11/21/21: BM x4.  11/15/21: HgbA1c 4.6%. 10/20/21: Cholesterol 107, HLD 61, LDL 40, TG 30. Rx: lipitor, iron, lasix, MVI. Wounds:  Surgical Incision (s/p aortic valve replacement 11/19/21-sterum incision)       Current Nutrition Therapies:    ADULT DIET; Regular; 3 carb choices (45 gm/meal); Low Sodium (2 gm); 2000 ml  ADULT ORAL NUTRITION SUPPLEMENT; Breakfast, Lunch, Dinner; Other Oral Supplement; activia and decaf hot beverage with meals  ADULT ORAL NUTRITION SUPPLEMENT; Breakfast, Lunch, Dinner; Standard High Calorie/High Protein Oral Supplement    Anthropometric Measures:  · Height: 5' 4\" (162.6 cm)  · Current Body Weight: 105 lb 8 oz (47.9 kg) (11/22/21 no edema)   · Admission Body Weight: 103 lb (46.7 kg) (11/19/21: +1 LE edema)    · Usual Body Weight:  (120-125# years ago, most recent usual weight~ 114# per pt. Per EMR: 7/7/21: 103#3. 2oz, 8/24/21: 108#12.8oz, 10/11/21: 104#8oz)     · Ideal Body Weight: 120 lbs;   · BMI: 18.1  · Adjusted Body Weight:  ; No Adjustment   · BMI Categories: Underweight (BMI less than 22) age over 72       Nutrition Diagnosis:   · Moderate malnutrition, In context of chronic illness related to inadequate protein-energy intake as evidenced by poor intake prior to admission, moderate loss of subcutaneous fat, moderate muscle loss      Nutrition Interventions:   Food and/or Nutrient Delivery:  Continue Current Diet, Start Oral Nutrition Supplement  Nutrition Education/Counseling:  Education completed (Heart Healthy, low sodium diet and 2 liter fluid restriction reviewed, handouts provided as well 11/22/21. Encouraged oral intake, protein sources, ONS use, and ileus prevention protocol (activia/hot beverage))   Coordination of Nutrition Care:  Continue to monitor while inpatient    Goals:  Patient will consume 75% or more of meals during LOS.        Nutrition Monitoring and Evaluation:   Behavioral-Environmental Outcomes:  None Identified   Food/Nutrient Intake Outcomes:  Food and Nutrient Intake, Supplement Intake, Vitamin/Mineral Intake  Physical Signs/Symptoms Outcomes:  Biochemical Data, GI Status, Fluid Status or Edema, Nutrition Focused Physical Findings, Skin, Weight     Discharge Planning:     Too soon to determine     Electronically signed by Caty Woods RD, LD on 11/22/21 at 1:50 PM EST    Contact: [phone]

## 2021-11-23 ENCOUNTER — APPOINTMENT (OUTPATIENT)
Dept: GENERAL RADIOLOGY | Age: 66
DRG: 220 | End: 2021-11-23
Attending: THORACIC SURGERY (CARDIOTHORACIC VASCULAR SURGERY)
Payer: MEDICARE

## 2021-11-23 VITALS
RESPIRATION RATE: 17 BRPM | HEIGHT: 64 IN | DIASTOLIC BLOOD PRESSURE: 54 MMHG | BODY MASS INDEX: 17.72 KG/M2 | WEIGHT: 103.8 LBS | SYSTOLIC BLOOD PRESSURE: 98 MMHG | TEMPERATURE: 97.6 F | HEART RATE: 96 BPM | OXYGEN SATURATION: 97 %

## 2021-11-23 LAB
ANION GAP SERPL CALCULATED.3IONS-SCNC: 12 MEQ/L (ref 8–16)
BUN BLDV-MCNC: 32 MG/DL (ref 7–22)
CALCIUM SERPL-MCNC: 8.2 MG/DL (ref 8.5–10.5)
CHLORIDE BLD-SCNC: 99 MEQ/L (ref 98–111)
CO2: 27 MEQ/L (ref 23–33)
CREAT SERPL-MCNC: 0.9 MG/DL (ref 0.4–1.2)
EKG ATRIAL RATE: 123 BPM
EKG P-R INTERVAL: 200 MS
EKG Q-T INTERVAL: 306 MS
EKG QRS DURATION: 78 MS
EKG QTC CALCULATION (BAZETT): 438 MS
EKG R AXIS: 48 DEGREES
EKG T AXIS: 72 DEGREES
EKG VENTRICULAR RATE: 123 BPM
ERYTHROCYTE [DISTWIDTH] IN BLOOD BY AUTOMATED COUNT: 17.4 % (ref 11.5–14.5)
ERYTHROCYTE [DISTWIDTH] IN BLOOD BY AUTOMATED COUNT: 61.1 FL (ref 35–45)
GFR SERPL CREATININE-BSD FRML MDRD: 62 ML/MIN/1.73M2
GLUCOSE BLD-MCNC: 107 MG/DL (ref 70–108)
GLUCOSE BLD-MCNC: 107 MG/DL (ref 70–108)
GLUCOSE BLD-MCNC: 110 MG/DL (ref 70–108)
HCT VFR BLD CALC: 29.3 % (ref 37–47)
HEMOGLOBIN: 9.6 GM/DL (ref 12–16)
MAGNESIUM: 1.9 MG/DL (ref 1.6–2.4)
MCH RBC QN AUTO: 31.3 PG (ref 26–33)
MCHC RBC AUTO-ENTMCNC: 32.8 GM/DL (ref 32.2–35.5)
MCV RBC AUTO: 95.4 FL (ref 81–99)
PLATELET # BLD: 81 THOU/MM3 (ref 130–400)
PMV BLD AUTO: 11.5 FL (ref 9.4–12.4)
POTASSIUM SERPL-SCNC: 3.3 MEQ/L (ref 3.5–5.2)
RBC # BLD: 3.07 MILL/MM3 (ref 4.2–5.4)
SODIUM BLD-SCNC: 138 MEQ/L (ref 135–145)
WBC # BLD: 3 THOU/MM3 (ref 4.8–10.8)

## 2021-11-23 PROCEDURE — 97116 GAIT TRAINING THERAPY: CPT

## 2021-11-23 PROCEDURE — APPSS60 APP SPLIT SHARED TIME 46-60 MINUTES: Performed by: PHYSICIAN ASSISTANT

## 2021-11-23 PROCEDURE — 97530 THERAPEUTIC ACTIVITIES: CPT

## 2021-11-23 PROCEDURE — 6370000000 HC RX 637 (ALT 250 FOR IP): Performed by: PHYSICIAN ASSISTANT

## 2021-11-23 PROCEDURE — 6370000000 HC RX 637 (ALT 250 FOR IP): Performed by: THORACIC SURGERY (CARDIOTHORACIC VASCULAR SURGERY)

## 2021-11-23 PROCEDURE — 83735 ASSAY OF MAGNESIUM: CPT

## 2021-11-23 PROCEDURE — 36415 COLL VENOUS BLD VENIPUNCTURE: CPT

## 2021-11-23 PROCEDURE — 85027 COMPLETE CBC AUTOMATED: CPT

## 2021-11-23 PROCEDURE — 97110 THERAPEUTIC EXERCISES: CPT

## 2021-11-23 PROCEDURE — 80048 BASIC METABOLIC PNL TOTAL CA: CPT

## 2021-11-23 PROCEDURE — 82948 REAGENT STRIP/BLOOD GLUCOSE: CPT

## 2021-11-23 PROCEDURE — 71046 X-RAY EXAM CHEST 2 VIEWS: CPT

## 2021-11-23 PROCEDURE — 2580000003 HC RX 258: Performed by: THORACIC SURGERY (CARDIOTHORACIC VASCULAR SURGERY)

## 2021-11-23 RX ORDER — POTASSIUM CHLORIDE 7.45 MG/ML
10 INJECTION INTRAVENOUS PRN
Status: DISCONTINUED | OUTPATIENT
Start: 2021-11-23 | End: 2021-11-23 | Stop reason: HOSPADM

## 2021-11-23 RX ORDER — AMIODARONE HYDROCHLORIDE 200 MG/1
200 TABLET ORAL DAILY
Qty: 30 TABLET | Refills: 0 | Status: SHIPPED | OUTPATIENT
Start: 2021-11-23 | End: 2021-12-20 | Stop reason: ALTCHOICE

## 2021-11-23 RX ORDER — POTASSIUM CHLORIDE 20 MEQ/1
20 TABLET, EXTENDED RELEASE ORAL 2 TIMES DAILY WITH MEALS
Qty: 60 TABLET | Refills: 3 | Status: SHIPPED | OUTPATIENT
Start: 2021-11-23 | End: 2021-12-20 | Stop reason: ALTCHOICE

## 2021-11-23 RX ORDER — POTASSIUM CHLORIDE 20 MEQ/1
40 TABLET, EXTENDED RELEASE ORAL PRN
Status: DISCONTINUED | OUTPATIENT
Start: 2021-11-23 | End: 2021-11-23 | Stop reason: HOSPADM

## 2021-11-23 RX ORDER — FUROSEMIDE 20 MG/1
20 TABLET ORAL ONCE
Status: DISCONTINUED | OUTPATIENT
Start: 2021-11-23 | End: 2021-11-23

## 2021-11-23 RX ORDER — POTASSIUM CHLORIDE 20 MEQ/1
20 TABLET, EXTENDED RELEASE ORAL PRN
Status: DISCONTINUED | OUTPATIENT
Start: 2021-11-23 | End: 2021-11-23 | Stop reason: HOSPADM

## 2021-11-23 RX ORDER — FUROSEMIDE 40 MG/1
40 TABLET ORAL DAILY
Status: DISCONTINUED | OUTPATIENT
Start: 2021-11-23 | End: 2021-11-23 | Stop reason: HOSPADM

## 2021-11-23 RX ORDER — POTASSIUM CHLORIDE 20 MEQ/1
20 TABLET, EXTENDED RELEASE ORAL DAILY
Qty: 30 TABLET | Refills: 0 | Status: SHIPPED | OUTPATIENT
Start: 2021-11-23 | End: 2021-11-23 | Stop reason: HOSPADM

## 2021-11-23 RX ORDER — FUROSEMIDE 40 MG/1
40 TABLET ORAL DAILY
Qty: 60 TABLET | Refills: 3 | Status: SHIPPED | OUTPATIENT
Start: 2021-11-23 | End: 2021-12-20 | Stop reason: ALTCHOICE

## 2021-11-23 RX ORDER — POTASSIUM CHLORIDE 20 MEQ/1
20 TABLET, EXTENDED RELEASE ORAL 2 TIMES DAILY WITH MEALS
Status: DISCONTINUED | OUTPATIENT
Start: 2021-11-23 | End: 2021-11-23 | Stop reason: HOSPADM

## 2021-11-23 RX ORDER — OXYCODONE HYDROCHLORIDE 5 MG/1
5 TABLET ORAL EVERY 6 HOURS PRN
Qty: 20 TABLET | Refills: 0 | Status: SHIPPED | OUTPATIENT
Start: 2021-11-23 | End: 2021-12-03 | Stop reason: SDUPTHER

## 2021-11-23 RX ADMIN — POTASSIUM CHLORIDE 20 MEQ: 1500 TABLET, EXTENDED RELEASE ORAL at 12:26

## 2021-11-23 RX ADMIN — ASPIRIN 325 MG: 325 TABLET, COATED ORAL at 08:39

## 2021-11-23 RX ADMIN — METOPROLOL TARTRATE 25 MG: 25 TABLET, FILM COATED ORAL at 08:39

## 2021-11-23 RX ADMIN — FERROUS SULFATE TAB 325 MG (65 MG ELEMENTAL FE) 325 MG: 325 (65 FE) TAB at 08:39

## 2021-11-23 RX ADMIN — SODIUM CHLORIDE, PRESERVATIVE FREE 10 ML: 5 INJECTION INTRAVENOUS at 08:39

## 2021-11-23 RX ADMIN — AMIODARONE HYDROCHLORIDE 200 MG: 200 TABLET ORAL at 08:39

## 2021-11-23 RX ADMIN — Medication 1 TABLET: at 08:39

## 2021-11-23 RX ADMIN — POTASSIUM CHLORIDE 40 MEQ: 1500 TABLET, EXTENDED RELEASE ORAL at 08:41

## 2021-11-23 RX ADMIN — FLUTICASONE PROPIONATE 1 SPRAY: 50 SPRAY, METERED NASAL at 08:38

## 2021-11-23 RX ADMIN — FAMOTIDINE 20 MG: 20 TABLET, FILM COATED ORAL at 08:39

## 2021-11-23 RX ADMIN — METOPROLOL TARTRATE 25 MG: 25 TABLET, FILM COATED ORAL at 02:17

## 2021-11-23 RX ADMIN — FUROSEMIDE 40 MG: 40 TABLET ORAL at 12:26

## 2021-11-23 ASSESSMENT — PAIN SCALES - GENERAL: PAINLEVEL_OUTOF10: 0

## 2021-11-23 NOTE — CARE COORDINATION
11/23/21, 10:30 AM EST    Patient goals/plan/ treatment preferences discussed by  and . Patient goals/plan/ treatment preferences reviewed with patient/ family. Patient/ family verbalize understanding of discharge plan and are in agreement with goal/plan/treatment preferences. Understanding was demonstrated using the teach back method. AVS provided by RN at time of discharge, which includes all necessary medical information pertaining to the patients current course of illness, treatment, post-discharge goals of care, and treatment preferences. Services After Discharge  Services At/After Discharge: Nursing Services [facility])   IMM Letter  IMM Letter given to Patient/Family/Significant other/Guardian/POA/by[de-identified]   IMM Letter date given[de-identified] 11/22/21  IMM Letter time given[de-identified] 0827     Patient discharge home today with new [address]. MONSERRAT left Anju message regarding discharge. MONSERRAT updated RN.

## 2021-11-23 NOTE — CARE COORDINATION
Magdaleno Rivera was evaluated today and a DME order was entered for a wheeled walker because she requires this to successfully complete daily living tasks of ambulating. A wheeled walker is necessary due to the patient's unsteady gait, upper body weakness, and inability to  an ambulation device; and she can ambulate only by pushing a walker instead of a lesser assistive device such as a cane, crutch, or standard walker. The need for this equipment was discussed with the patient and she understands and is in agreement.

## 2021-11-23 NOTE — PROGRESS NOTES
99 [facility] ICU STEPDOWN TELEMETRY 4K  Occupational Therapy  Daily Note  Time:   Time In: 8715  Time Out: 0803  Timed Code Treatment Minutes: 25 Minutes  Minutes: 25          Date: 2021  Patient Name: Connor Miller,   Gender: female      Room: [facility]17/017-A  MRN: [id number]  : 1955  (78 y.o.)  Referring Practitioner: Ricky Good MD     Additional Pertinent Hx: 78year old female, no previous cardiovascular history, presents with recent diagnosis of aortic insufficiency prompted by new onset of peripheral edema. Patient additionally reports mild dyspnea on exertion; denies chest pain, orthopnea or presyncope. Aortic valve replacement with a 25 mm [employer]. [employer] Trifecta bioprosthesis on     Restrictions/Precautions:  Restrictions/Precautions: General Precautions, Fall Risk, Surgical Protocols  Position Activity Restriction  Sternal Precautions: No Pushing, No Pulling, 10# Lifting Restrictions  Other position/activity restrictions: 1 chest tube- pulled      SUBJECTIVE: Pt seated in bedside chair upon arrival, agreeable to OT session. PAIN: No c/o. Vitals: Vitals not assessed per clinical judgement, see nursing flowsheet    COGNITION: WFL    ADL:   No ADL's completed this session. Disha Brunner BALANCE:  Sitting Balance:  Modified Independent. Standing Balance: Supervision. x1 minute in prep for mobility. BED MOBILITY:  Not Tested    TRANSFERS:  Sit to Stand:  Supervision. Stand to Sit: Supervision. Comment: To/from bedside chair. FUNCTIONAL MOBILITY:  Assistive Device: None  Assist Level:  Stand By Assistance- therapist followed with RW for safty. Distance:   Completed functional mobility unit hallway x1 lap around nurses station at fair pace, no LOB noted. Pt requires no cues for safety- seated rest break after trial of mobility, min fatigue noted.      ADDITIONAL ACTIVITIES:  Pt completed CABG Step II exercises x10 reps x1 set this date in order to increase strength and improve activity tolerance for ADLs and homemaking tasks. Pt exhibited no fatigue during task, requring brief rest breaks and no VCs for technique following written handout. Pt verbalized to continue HEP 3x/day following d/c.    ASSESSMENT:     Activity Tolerance:  Patient tolerance of  treatment: good. Discharge Recommendations: Home with Home Health OT  Equipment Recommendations: Equipment Needed: No  Plan: Times per week: 6x  Times per day: Daily  Current Treatment Recommendations: Strengthening, Patient/Caregiver Education & Training, Home Management Training, Functional Mobility Training, Endurance Training, Safety Education & Training, Self-Care / ADL, Equipment Evaluation, Education, & procurement    Patient Education  Patient Education: Home Exercise Program, Precautions and Safety wih tranfers and mobility. Goals  Short term goals  Time Frame for Short term goals: by discharge  Short term goal 1: Pt will complete functional mobility within [facility] distances mod-I to access ADLs  Short term goal 2: Pt will complete BADL routine mod-I to increase indep in completing self care tasks  Short term goal 3: Pt will complete functional transfers mod-I without requiring verbal prompts to maintain sternal precautions to complete needed ADLs  Short term goal 4: pt will complete dynamic standing tasks mod-I with 1-2 UE release for x6 minutes to increase endurance to perform grooming tasks  Short term goal 5: Pt will complete CABG stage 2 exercises x15 reps x2 sets with no > than 1 vc for technique to increase endurance to complete needed ADLs. Following session, patient left in safe position with all fall risk precautions in place.

## 2021-11-23 NOTE — DISCHARGE SUMMARY
CT/CV Surgery Discharge Summary     Pt Name: Darin Crook  MRN: [id number]  YOB: 1955  Primary Care Physician: NATALY Palomino CNP    Admit date:  11/19/2021  5:33 AM     Discharge date:  11/23/21     Disposition: Home    Admitting Diagnosis: Aortic stenosis    Discharge Diagnosis: Aortic stenosis    Condition: Stable    Problem List:   Patient Active Problem List   Diagnosis Code    Stopped smoking between 1 and 3 months ago Z87.891    Family history of breast cancer in sister Z80.2    History of smoking 30 or more pack years Z87.891    Severe aortic insufficiency I35.1    Moderate to severe aortic valve regurgitation I35.1    S/P AVR Z95.2       Procedures: 11/19/21   Aortic valve replacement with a 25 mm [employer]. [employer] Medical Trifecta bioprosthesis  Reason for Admission: \"The patient is a 78y.o. year-old female who presents with severe symptomatic aortic insufficiency,\" per Dr. Hemanth Robertson operative report. Hospital Course: Following an uncomplicated  AVR, the patient had an unremarkable and progressive convalescence without adverse events. At time of discharge, Giselle Somers was alert, tolerating a regular diet, having bowel movements, ambulating on her own accord and had adequate analgesia on oral pain medications, and had no signs of any complications. Discharge Vitals:  height is 5' 4\" (1.626 m) and weight is 103 lb 12.8 oz (47.1 kg). Her oral temperature is 98.2 °F (36.8 °C). Her blood pressure is 107/61 and her pulse is 107. Her respiration is 18 and oxygen saturation is 98%.      DISCHARGE INSTRUCTIONS:  Discharge Medications:         Medication List      START taking these medications    amiodarone 200 MG tablet  Commonly known as: CORDARONE  Take 1 tablet by mouth daily     aspirin 325 MG EC tablet  Take 1 tablet by mouth daily     metoprolol tartrate 25 MG tablet  Commonly known as: LOPRESSOR  Take 1 tablet by mouth 2 times daily     oxyCODONE 5 MG immediate release tablet  Commonly known as: ROXICODONE  Take 1 tablet by mouth every 6 hours as needed for Pain for up to 5 days. potassium chloride 20 MEQ extended release tablet  Commonly known as: KLOR-CON M  Take 1 tablet by mouth 2 times daily (with meals)        CHANGE how you take these medications    fluticasone 50 MCG/ACT nasal spray  Commonly known as: FLONASE  1 spray by Each Nostril route daily  What changed:   · when to take this  · reasons to take this     furosemide 40 MG tablet  Commonly known as: LASIX  Take 1 tablet by mouth daily  What changed:   · medication strength  · how much to take        CONTINUE taking these medications    Biotin 1000 MCG Tabs     calcium carbonate 500 MG Tabs tablet  Commonly known as: OSCAL     ferrous sulfate 325 (65 Fe) MG tablet  Commonly known as: IRON 325     Multiple Vitamin Tabs     PRESERVISION AREDS PO           Where to Get Your Medications      These medications were sent to [facility] [address], [address]  [address] [place] [address], [address] [address]    Phone: [phone]   · amiodarone 200 MG tablet  · aspirin 325 MG EC tablet  · furosemide 40 MG tablet  · metoprolol tartrate 25 MG tablet  · potassium chloride 20 MEQ extended release tablet     You can get these medications from any pharmacy    Bring a paper prescription for each of these medications  · oxyCODONE 5 MG immediate release tablet         Diet: AHA diet as tolerated    Activity: As instructed on discharge, no lifting more than 10 lbs for one month, no driving while on narcotics. Aerobic activity (walking, climbing stairs, etc.) is encouraged. Wound Care: Clean wounds as instructed on discharge    OFFICE VISIT   ? You should have a follow up appointment in the office in about 1 month after discharge from the hospital.   ?   You may call the office to make an appointment, if you don't have an appointment. ([phone]).    ? You will need a chest xray and labs taken around 3-7 days before your follow up appointment. ?   Bring any questions you have so they may be addressed. ? You will need a follow up appointment with you primary care doctor in 7-10 days from discharge, please call and make one if you do not have one.   ? You will need a follow up appointment with you Cardiologist in 2-3 weeks from discharge, please call and make one if you do not have one.   ? BRING YOUR MEDICATION LIST and medications even over the counter medications to all your follow up apointments. ? Office Location:   [address], [address] [facility], [facility]            EMERGENCIES   ? You should either call 911 or go to the Emergency Room to be evaluated if you need seen immediately. ?         Sudden, severe shortness of breath go to the Emergency Room. If you have questions regarding these instructions, please call our office  [phone]. We have an answering service 24 hours a day to get your calls to the ON Call Physician, but we are often in surgery and it takes us awhile to get back to you. Follow-up:    1   Follow up with NATALY Palomino CNP 2-3 weeks  2. Follow up with Melissa Scanlon PA-C  in 3-4 weeks, with PA and Lateral CXR, CBC, and BMP. 3. The pt was agreeable to discharge and future plan of care. All questions were thoroughly answered.        Danny Andino PA-C   Electronincally signed 11/23/2021 at 9:22 AM    CC: NATALY Palomino CNP

## 2021-11-23 NOTE — DISCHARGE SUMMARY
CT/CV Surgery Discharge Summary     Pt Name: Maria Dolores Muhammad  MRN: [id number]  YOB: 1955  Primary Care Physician: NATALY Chan CNP    Admit date:  11/19/2021  5:33 AM     Discharge date:  11/23/21     Disposition: Home    Admitting Diagnosis: Aortic stenosis    Discharge Diagnosis: Aortic stenosis    Condition: Stable    Problem List:   Patient Active Problem List   Diagnosis Code    Stopped smoking between 1 and 3 months ago Z87.891    Family history of breast cancer in sister Z80.2    History of smoking 30 or more pack years Z87.891    Severe aortic insufficiency I35.1    Moderate to severe aortic valve regurgitation I35.1    S/P AVR Z95.2       Procedures:  11/19/21   Aortic valve replacement with a 25 mm [employer] Medical Trifecta bioprosthesis  Reason for Admission: \"The patient is a 78y.o. year-old female who presents with severe symptomatic aortic insufficiency,\" per Dr. Ryan Moore operative report. Hospital Course: Following an uncomplicated  AVR, the patient had an unremarkable and progressive convalescence without adverse events. At time of discharge, Ashtyn Barnes was alert, tolerating a regular diet, having bowel movements, ambulating on her own accord and had adequate analgesia on oral pain medications, and had no signs of any complications. Discharge Vitals:  height is 5' 4\" (1.626 m) and weight is 103 lb 12.8 oz (47.1 kg). Her oral temperature is 98.2 °F (36.8 °C). Her blood pressure is 107/61 and her pulse is 107. Her respiration is 18 and oxygen saturation is 98%.      DISCHARGE INSTRUCTIONS:  Discharge Medications:         Medication List      START taking these medications    amiodarone 200 MG tablet  Commonly known as: CORDARONE  Take 1 tablet by mouth daily     aspirin 325 MG EC tablet  Take 1 tablet by mouth daily     metoprolol tartrate 25 MG tablet  Commonly known as: LOPRESSOR  Take 1 tablet by mouth 2 times daily     oxyCODONE 5 MG immediate release tablet  Commonly known as: ROXICODONE  Take 1 tablet by mouth every 6 hours as needed for Pain for up to 5 days. potassium chloride 20 MEQ extended release tablet  Commonly known as: KLOR-CON M  Take 1 tablet by mouth daily        CHANGE how you take these medications    fluticasone 50 MCG/ACT nasal spray  Commonly known as: FLONASE  1 spray by Each Nostril route daily  What changed:   · when to take this  · reasons to take this        CONTINUE taking these medications    Biotin 1000 MCG Tabs     calcium carbonate 500 MG Tabs tablet  Commonly known as: OSCAL     ferrous sulfate 325 (65 Fe) MG tablet  Commonly known as: IRON 325     furosemide 20 MG tablet  Commonly known as: Lasix  Take 1 tablet by mouth daily     Multiple Vitamin Tabs     PRESERVISION AREDS PO           Where to Get Your Medications      These medications were sent to [facility] [address], [address]  [address] [place], [place] [address] [facility] Floor, [address] [address]    Phone: [phone]   · amiodarone 200 MG tablet  · aspirin 325 MG EC tablet  · metoprolol tartrate 25 MG tablet  · potassium chloride 20 MEQ extended release tablet     You can get these medications from any pharmacy    Bring a paper prescription for each of these medications  · oxyCODONE 5 MG immediate release tablet         Diet: AHA diet as tolerated    Activity: As instructed on discharge, no lifting more than 10 lbs for one month, no driving while on narcotics. Aerobic activity (walking, climbing stairs, etc.) is encouraged. Wound Care: Clean wounds as instructed on discharge    OFFICE VISIT   ? You should have a follow up appointment in the office in about 1 month after discharge from the hospital.   ?   You may call the office to make an appointment, if you don't have an appointment. ([phone]). ? You will need a chest xray and labs taken around 3-7 days before your follow up appointment.      ?   Bring any questions you have so they may be addressed. ? You will need a follow up appointment with you primary care doctor in 7-10 days from discharge, please call and make one if you do not have one.   ? You will need a follow up appointment with you Cardiologist in 2-3 weeks from discharge, please call and make one if you do not have one.   ? BRING YOUR MEDICATION LIST and medications even over the counter medications to all your follow up apointments. ? Office Location:   [address], [address], [facility].[address]ENS[address]            EMERGENCIES   ? You should either call 911 or go to the Emergency Room to be evaluated if you need seen immediately. ?         Sudden, severe shortness of breath go to the Emergency Room. If you have questions regarding these instructions, please call our office  [phone]. We have an answering service 24 hours a day to get your calls to the ON Call Physician, but we are often in surgery and it takes us awhile to get back to you. Follow-up:    1   Follow up with NATALY Hassan CNP 2-3 weeks  2. Follow up with Geeta Steele PA-C  in 3-4 weeks, with PA and Lateral CXR, CBC, and BMP. 3. The pt was agreeable to discharge and future plan of care. All questions were thoroughly answered.        Jeri Portillo PA-C   Electronincally signed 11/23/2021 at 8:48 AM    CC: NATALY Hassan CNP

## 2021-11-23 NOTE — DISCHARGE INSTR - DIET
Good nutrition is important when healing from an illness, injury, or surgery. Follow any nutrition recommendations given to you during your hospital stay. If you were given an oral nutrition supplement while in the hospital, continue to take this supplement at home. You can take it with meals, in-between meals, and/or before bedtime. These supplements can be purchased at most local grocery stores, pharmacies, and chain super-stores. If you have any questions about your diet or nutrition, call the hospital and ask for the dietitian. You are being placed on a diabetic carb counting diet. Eating healthy is the first step in controlling diabetes    Here's how to get started. ... Eat 3 meals a day. Eat your meals at the same time each day and do not skip meals. Eat about the same amount of food each day. Limit sugar and sweets. Eat less candy, desserts, pastries and jelly. Limit intake of regular pop, sugary beverages and fruit juice. Drink sugar free beverages such as diet pop, water, Crystal Light, and unsweetened tea instead. Use Equal or Sweet-n-Low in place of sugar. Lose weight if you are overweight. Even a small amount of weight loss may help improve your blood sugar control. To help lose weight, reduce your portion sizes. Control your intake of carbohydrates. Carbohydrate is the main  nutrient that affects blood sugar levels. All the carbohydrate you eat is turned  into sugar by your body. Therefore, it is important to control  the amount  of carbohydrate that you eat a day. You should eat about 60-75  grams of  carbohydrate at each meal.        Common sources of carbohydrates:     Eat more fiber. Fiber can help slow down the rise in blood sugar following a meal.  To get more fiber in your diet, eat at least 5 servings of fruits and vegetables a day, choose whole grain bread/cereal and eat more beans or legumes. Reduce your intake of high fat foods.    Cutting back on your intake of high fat food can help reduce body weight and cholesterol levels. Reduce intake of fried food, mejia, sausage, luncheon meat, gravy, sour cream, cheese, egg yolks and margarine/butter. Limit your intake of alcohol. Drink alcohol only with permission of your doctor. Never drink alcohol on an empty stomach. Be more active. Regular exercise is an important part of your diabetes care as exercise can help lower your blood sugar levels. The type and amount of exercise that is right for you should be discussed with your doctor.

## 2021-11-23 NOTE — PROGRESS NOTES
[facility] PHYSICAL THERAPY  DAILY NOTE  STRZ ICU STEPDOWN TELEMETRY 4K - 4K-17/017-A  Time In: 7079  Time Out: 1141  Timed Code Treatment Minutes: 24 Minutes  Minutes: 24          Date: 2021  Patient Name: Greyson Zaidi,  Gender:  female        MRN: [id number]  : 1955  (78 y.o.)     Referring Practitioner: Tanisha Multani MD  Diagnosis: Moderate to severe aortic valve regurgitation  Additional Pertinent Hx: 78year old female, no previous cardiovascular history, presents with recent diagnosis of aortic insufficiency prompted by new onset of peripheral edema. Patient additionally reports mild dyspnea on exertion; denies chest pain, orthopnea or presyncope. Aortic valve replacement with a 25 mm [employer] bioprosthesis on      Prior Level of Function:  Lives With: Significant other  Type of Home: House  Home Layout: Two level  Home Access: Stairs to enter with rails  Entrance Stairs - Number of Steps: 2  Home Equipment:  (no AD)   Bathroom Shower/Tub: Tub/Shower unit  Bathroom Toilet: Standard  Bathroom Equipment: Grab bars in shower  Bathroom Accessibility: Accessible    ADL Assistance: [address]: [facility] Responsibilities: Yes  Ambulation Assistance: Independent  Transfer Assistance: Independent  Active : Yes  Additional Comments: Pt bed/bath upstairs, plan is to go upstairs when home and stay upstairs for a few days while recovering. Restrictions/Precautions:  Restrictions/Precautions: General Precautions, Fall Risk, Surgical Protocols  Position Activity Restriction  Sternal Precautions: No Pushing, No Pulling, 10# Lifting Restrictions  Other position/activity restrictions: 1 chest tube- pulled      SUBJECTIVE: Ok to see pt per nursing. Pt in bedside chair when therapy arrived with SO present. Pt pleasant and agreeable to PT session, reports she is leaving soon to home with [facility] services.      PAIN: denies    Vitals: Vitals not assessed per clinical judgement, see nursing flowsheet  Nurse checked vitals prior to session    OBJECTIVE:  Bed Mobility:  Not Tested    Transfers:  Sit to Stand: Supervision, X 1  Stand to Sit:Supervision, X 1   good safety noted, no LOB  Ambulation:  Supervision, Stand By Assistance, X 1, with cues for safety  Distance: 300 feet  Surface: Level Tile  Device:No Device  Gait Deviations:  Slow Mari, Decreased Step Length Bilaterally and Decreased Heel Strike Bilaterally  Cues for safety with mobility with no LOB noted. Pt with good safety noted. Pt educated on use of RW in the home for safety. PT adjusted her own personal walker during session. Stairs:  Supervision, X 1  Number of Steps: 4  Height: 6\" step with Bilateral Handrails, reciprocal step pattern with good safety and no LOB noted   Balance:  Static Sitting Balance:  Modified Independent  Dynamic Sitting Balance: Modified Independent  Static Standing Balance: Supervision  No LOB noted  Exercise:  CABG exercises completed. Patient was guided in 1 set(s) 15 reps of exercise to both upper and lower extremities. Lower trunk rotations, Shoulder horizontal abduction/adduction, Shoulder rolls, Seated marches, Seated hamstring curls, Seated heel/toe raises, Long arc quads, Seated isometric hip adduction and Seated abduction/adduction. Exercises were completed for increased independence with functional mobility. Pt required VC and visual cues for proper technique of exercises for completion with good demo. Functional Outcome Measures: Completed  AM-PAC Inpatient Mobility Raw Score : 22  AM-PAC Inpatient T-Scale Score : 53.28    ASSESSMENT:  Assessment: Patient progressing toward established goals. Activity Tolerance:  Patient tolerance of  treatment: good.      Equipment Recommendations:Equipment Needed: No  Discharge Recommendations: Home with Assist as Needed, Home with Home Health PT and Patient would benefit from continued PT at discharge  Plan: Times per week: 6x CABG  Times per day: Daily  Current Treatment Recommendations: Strengthening, Balance Training, Endurance Training, Transfer Training, Functional Mobility Training, Stair training, Gait Training, Home Exercise Program, Safety Education & Training, Patient/Caregiver Education & Training, Equipment Evaluation, Education, & procurement    Patient Education  Patient Education: Plan of Care, Home Exercise Program, Equipment Education, Transfers, Gait, Stairs, Use of Gait [place], Verbal Exercise Instruction,  - Patient Verbalized Understanding, - Patient Requires Continued Education    Goals:  Patient goals : none stated  Short term goals  Time Frame for Short term goals: by discharge  Short term goal 1: bed mobility with HOB flat, no rails, mod I for increased functional ind  Short term goal 2: sit<>stand from various surfaces with LRD mod I for safe transfers  Short term goal 3: ambulate 100' with LRD mod I for safe household ambulation  Short term goal 4: navigate 10 steps with LRD mod I for safe enter/exit of home and navigation of home  Long term goals  Time Frame for Long term goals : NA d/t short ELOS    Following session, patient left in safe position with all fall risk precautions in place. Pt left in bedside chair with all needs and call light in reach following session, chair alarm on.

## 2021-11-23 NOTE — FLOWSHEET NOTE
11/22/21 2149   Provider Notification   Reason for Communication Evaluate  ('s-120's)   Provider Name Angélica Narvaez   Provider Notification Physician   Method of Communication Secure Message   Response See orders  (Lopressor 25 mg PO BID)   Notification Time 2159 2149- This Caden Lucio as no one was on call for Dr. Randall Angulo. Pt's HR this evening has been in the 110's-120's. Pt's bp is 125/56 MAP 76. Pt is not on any beta-blocker medications. This RN also ordered an EKG. EKG read sinus tachycardia. 2159- Message received. Ordered was Lopressor 25 mg PO BID. This RN will continue to monitor pt's HR overnight.

## 2021-11-23 NOTE — PROGRESS NOTES
Discharge teaching and instructions for diagnosis/procedure of moderate to severe aortic stenosis completed with patient using teachback method. AVS reviewed. Printed prescriptions given to patient. Patient voiced understanding regarding prescriptions, follow up appointments, and care of self at home.  Discharged in a wheelchair to  home with support per family

## 2021-11-23 NOTE — CARE COORDINATION
11/23/21 9:00 AM    Called and spoke with Roly Walker at [facility]. Reported have order for 2 wheeled walker. Patient to be discharged today.

## 2021-11-24 ENCOUNTER — TELEPHONE (OUTPATIENT)
Dept: FAMILY MEDICINE CLINIC | Age: 66
End: 2021-11-24

## 2021-11-24 NOTE — TELEPHONE ENCOUNTER
Nat[facility] Transitions Initial Follow Up Call    Call within 2 business days of discharge: Yes     Patient: Eilene Parra Patient : 1955   MRN: [id number]  Reason for Admission: s/p AVR  Discharge Date: 21 RARS: Readmission Risk Score: 10.8 ( )       Spoke with: patient    Discharge department/facility: [facility]    Non-face-to-face services provided:  advised to keep f/u appt on 21    Follow Up  Future Appointments   Date Time Provider Yvonne Dailey   2021 10:20 AM NATALY Faith - CNP SRPX [facility] - [place] [facility] [place] [address]   2021 10:00 AM CHANTEL Blank SRPX CT/CV [facility] - [place] [facility] [place]   3/17/2022 12:30  [facility] 63MD FORTINO [facility] STEVEN Ma CMA ([facility])

## 2021-11-26 LAB
ACTIVATED CLOTTING TIME: 112 SECONDS (ref 99–130)
ACTIVATED CLOTTING TIME: 488 SECONDS (ref 99–130)
ACTIVATED CLOTTING TIME: 636 SECONDS (ref 99–130)
CARTRIDGE COLOR: NORMAL
PATIENT HEPARIN CONCENTRATION: 0
PATIENT HEPARIN CONCENTRATION: 2
PATIENT HEPARIN CONCENTRATION: 3
RANGE: NORMAL

## 2021-11-29 ENCOUNTER — OFFICE VISIT (OUTPATIENT)
Dept: FAMILY MEDICINE CLINIC | Age: 66
End: 2021-11-29
Payer: MEDICARE

## 2021-11-29 VITALS
SYSTOLIC BLOOD PRESSURE: 104 MMHG | BODY MASS INDEX: 17.07 KG/M2 | HEIGHT: 64 IN | DIASTOLIC BLOOD PRESSURE: 70 MMHG | WEIGHT: 100 LBS | RESPIRATION RATE: 20 BRPM | HEART RATE: 72 BPM

## 2021-11-29 DIAGNOSIS — Z09 HOSPITAL DISCHARGE FOLLOW-UP: Primary | ICD-10-CM

## 2021-11-29 DIAGNOSIS — Z95.2 S/P AVR: ICD-10-CM

## 2021-11-29 LAB
ACTIVATED CLOTTING TIME: 550 SECONDS (ref 99–130)
ACTIVATED CLOTTING TIME: 646 SECONDS (ref 99–130)
ACTIVATED CLOTTING TIME: 836 SECONDS (ref 99–130)
CARTRIDGE COLOR: NORMAL
PATIENT HEPARIN CONCENTRATION: 2
PATIENT HEPARIN CONCENTRATION: 2
PATIENT HEPARIN CONCENTRATION: 2.5
RANGE: NORMAL

## 2021-11-29 PROCEDURE — 99496 TRANSJ CARE MGMT HIGH F2F 7D: CPT | Performed by: NURSE PRACTITIONER

## 2021-11-29 PROCEDURE — 1111F DSCHRG MED/CURRENT MED MERGE: CPT | Performed by: NURSE PRACTITIONER

## 2021-11-29 ASSESSMENT — ENCOUNTER SYMPTOMS
ABDOMINAL PAIN: 0
SORE THROAT: 0
EYE PAIN: 0
BACK PAIN: 0
EYE REDNESS: 0
VOMITING: 0
DIARRHEA: 0
TROUBLE SWALLOWING: 0
ALLERGIC/IMMUNOLOGIC NEGATIVE: 1
COUGH: 0
CONSTIPATION: 0
NAUSEA: 0
EYE DISCHARGE: 0
SHORTNESS OF BREATH: 0
WHEEZING: 0
RHINORRHEA: 0

## 2021-11-29 NOTE — PROGRESS NOTES
Post-Discharge Transitional Care Management Services or Hospital Follow Up      Skylar Morales   YOB: 1955    Date of Office Visit:  11/29/2021  Date of Hospital Admission: 11/19/21  Date of Hospital Discharge: 11/23/21  Readmission Risk Score(high >=14%.  Medium >=10%):Readmission Risk Score: 10.8 ( )      Care management risk score Rising risk (score 2-5) and Complex Care (Scores >=6): 0     Non face to face  following discharge, date last encounter closed (first attempt may have been earlier): 11/24/2021  2:29 PM 11/24/2021  2:29 PM    Call initiated 2 business days of discharge: Yes     Patient Active Problem List   Diagnosis    Stopped smoking between 1 and 3 months ago    Family history of breast cancer in sister    History of smoking 30 or more pack years    Severe aortic insufficiency    S/P AVR       No Known Allergies    Medications listed as ordered at the time of discharge from hospital  @DISCHARGEMEDSLIST(<NOROUTINE> error)@      Medications marked \"taking\" at this time  Outpatient Medications Marked as Taking for the 11/29/21 encounter (Office Visit) with NATALY Hackett CNP   Medication Sig Dispense Refill    aspirin 325 MG EC tablet Take 1 tablet by mouth daily 30 tablet 3    amiodarone (CORDARONE) 200 MG tablet Take 1 tablet by mouth daily 30 tablet 0    metoprolol tartrate (LOPRESSOR) 25 MG tablet Take 1 tablet by mouth 2 times daily 60 tablet 3    furosemide (LASIX) 40 MG tablet Take 1 tablet by mouth daily 60 tablet 3    potassium chloride (KLOR-CON M) 20 MEQ extended release tablet Take 1 tablet by mouth 2 times daily (with meals) 60 tablet 3    calcium carbonate (OSCAL) 500 MG TABS tablet Take 500 mg by mouth daily      ferrous sulfate (IRON 325) 325 (65 Fe) MG tablet Take 325 mg by mouth Daily with lunch       Multiple Vitamins-Minerals (PRESERVISION AREDS PO) Take 2 tablets by mouth daily      fluticasone (FLONASE) 50 MCG/ACT nasal spray 1 spray by Each Nostril route daily (Patient taking differently: 1 spray by Each Nostril route daily as needed ) 2 Bottle 1    Multiple Vitamin TABS Take by mouth daily      Biotin 1000 MCG TABS Take by mouth daily          Medications patient taking as of now reconciled against medications ordered at time of hospital discharge: Yes    Chief Complaint   Patient presents with   [address] from Hospital     QUENTIN-Discharged from [facility] on 11-23-21 following open heart surgery with Dr Garland Lopez        HPI    Inpatient course: Discharge summary reviewed- see chart. Interval history/Current status: stable and improving      Review of Systems   Constitutional: Negative for activity change, fatigue and fever. HENT: Negative for congestion, ear pain, rhinorrhea, sore throat and trouble swallowing. Eyes: Negative for pain, discharge and redness. Respiratory: Negative for cough, shortness of breath and wheezing. Cardiovascular: Negative. Gastrointestinal: Negative for abdominal pain, constipation, diarrhea, nausea and vomiting. Endocrine: Negative. Genitourinary: Negative for dysuria, frequency and urgency. Musculoskeletal: Negative for arthralgias, back pain and myalgias. Skin: Positive for wound. Negative for rash. Allergic/Immunologic: Negative. Neurological: Negative for dizziness, tremors, weakness and headaches. Hematological: Negative. Psychiatric/Behavioral: Negative for dysphoric mood and sleep disturbance. The patient is not nervous/anxious. Vitals:    11/29/21 1018   BP: 104/70   Pulse: 72   Resp: 20   Weight: 100 lb (45.4 kg)   Height: 5' 4.25\" (1.632 m)     Body mass index is 17.03 kg/m². Wt Readings from Last 3 Encounters:   11/29/21 100 lb (45.4 kg)   11/23/21 103 lb 12.8 oz (47.1 kg)   11/15/21 105 lb (47.6 kg)     BP Readings from Last 3 Encounters:   11/29/21 104/70   11/23/21 (!) 98/54   11/19/21 (!) 94/50       Physical Exam  Vitals reviewed.    Constitutional:       General: She is not in acute distress. Appearance: Normal appearance. She is well-developed. She is not toxic-appearing or diaphoretic. HENT:      Head: Normocephalic. No right periorbital erythema or left periorbital erythema. Jaw: No trismus. Right Ear: Hearing, tympanic membrane, ear canal and external ear normal.      Left Ear: Hearing, tympanic membrane, ear canal and external ear normal.      Nose: Nose normal. No mucosal edema or rhinorrhea. Mouth/Throat:      Mouth: Mucous membranes are moist.      Dentition: Normal dentition. Pharynx: Uvula midline. No posterior oropharyngeal erythema. Tonsils: No tonsillar exudate. 0 on the right. 0 on the left. Eyes:      General: Lids are normal.         Right eye: No discharge. Left eye: No discharge. Conjunctiva/sclera: Conjunctivae normal.      Right eye: Right conjunctiva is not injected. No chemosis. Left eye: No chemosis. Pupils: Pupils are equal, round, and reactive to light. Neck:      Vascular: No JVD. Trachea: Trachea normal.   Cardiovascular:      Rate and Rhythm: Normal rate and regular rhythm. Heart sounds: Normal heart sounds. No murmur heard. Pulmonary:      Effort: Pulmonary effort is normal. No respiratory distress. Breath sounds: Normal breath sounds. No stridor. No wheezing. Abdominal:      General: Bowel sounds are normal. There is no distension. Palpations: Abdomen is soft. Tenderness: There is no abdominal tenderness. Musculoskeletal:         General: No tenderness or signs of injury. Normal range of motion. Cervical back: Full passive range of motion without pain and normal range of motion. Lymphadenopathy:      Cervical: No cervical adenopathy. Skin:     General: Skin is warm and dry. Capillary Refill: Capillary refill takes less than 2 seconds. Findings: No rash. Neurological:      Mental Status: She is alert and oriented to person, place, and time. GCS: GCS eye subscore is 4. GCS verbal subscore is 5. GCS motor subscore is 6. Cranial Nerves: No cranial nerve deficit. Coordination: Coordination normal.      Gait: Gait normal.   Psychiatric:         Mood and Affect: Mood is not anxious or depressed. Speech: Speech normal.         Behavior: Behavior normal. Behavior is not withdrawn or hyperactive. Behavior is cooperative. Thought Content: Thought content normal.         Judgment: Judgment normal.         Assessment/Plan:    Patient is status post AVR less than 1 week ago. States she is doing very well, eating almost normally, working minimizing fluid intake as ordered by her cardiologist, having bowel movements. Denies fever or chills. Having some problems with back pain and she sleeps on her back, is taking her oxycodone for this. Recommended to her that she had Tylenol to it for additional pain relief. Physical exam is unremarkable, wound sites are clean dry and without any indication of infection.   Vital signs are normal.    Medical Decision Making: high complexity

## 2021-12-01 LAB
ACTIVATED CLOTTING TIME: 127 SECONDS (ref 99–130)
PATIENT BOLUS: 8284
PROJECTED HEPARIN CONCENTATION: 1.9

## 2021-12-03 DIAGNOSIS — Z95.2 S/P AVR: ICD-10-CM

## 2021-12-03 RX ORDER — OXYCODONE HYDROCHLORIDE 5 MG/1
5 TABLET ORAL EVERY 6 HOURS PRN
Qty: 20 TABLET | Refills: 0 | Status: SHIPPED | OUTPATIENT
Start: 2021-12-03 | End: 2021-12-08

## 2021-12-03 NOTE — TELEPHONE ENCOUNTER
----- Message from Arpita Sanabria sent at 12/3/2021  8:56 AM EST -----  Subject: Refill Request    QUESTIONS  Name of Medication? oxyCODONE (ROXICODONE) 5 MG immediate release tablet  Patient-reported dosage and instructions? 5mg tab, twice daily   How many days do you have left? 1  Preferred Pharmacy? [address] phone number (if available)? [phone]  ---------------------------------------------------------------------------  --------------  CALL BACK INFO  What is the best way for the office to contact you? OK to leave message on   voicemail  Preferred Call Back Phone Number?  [phone]

## 2021-12-20 ENCOUNTER — TELEPHONE (OUTPATIENT)
Dept: FAMILY MEDICINE CLINIC | Age: 66
End: 2021-12-20

## 2021-12-20 ENCOUNTER — OFFICE VISIT (OUTPATIENT)
Dept: CARDIOTHORACIC SURGERY | Age: 66
End: 2021-12-20

## 2021-12-20 ENCOUNTER — HOSPITAL ENCOUNTER (OUTPATIENT)
Dept: GENERAL RADIOLOGY | Age: 66
Discharge: HOME | End: 2021-12-20
Payer: MEDICARE

## 2021-12-20 ENCOUNTER — HOSPITAL ENCOUNTER (OUTPATIENT)
Age: 66
Discharge: HOME | End: 2021-12-20
Payer: MEDICARE

## 2021-12-20 VITALS
HEIGHT: 64 IN | WEIGHT: 102.6 LBS | SYSTOLIC BLOOD PRESSURE: 126 MMHG | HEART RATE: 78 BPM | BODY MASS INDEX: 17.52 KG/M2 | OXYGEN SATURATION: 100 % | DIASTOLIC BLOOD PRESSURE: 68 MMHG

## 2021-12-20 DIAGNOSIS — Z95.2 S/P AVR: ICD-10-CM

## 2021-12-20 DIAGNOSIS — Z95.2 AORTIC VALVE REPLACED: Primary | ICD-10-CM

## 2021-12-20 PROCEDURE — 99024 POSTOP FOLLOW-UP VISIT: CPT | Performed by: PHYSICIAN ASSISTANT

## 2021-12-20 PROCEDURE — 71046 X-RAY EXAM CHEST 2 VIEWS: CPT

## 2021-12-20 NOTE — TELEPHONE ENCOUNTER
----- Message from Mirna Meier sent at 12/20/2021 12:54 PM EST -----  Subject: Hospital Follow Up    QUESTIONS  What hospital was the Patient Discharged from? [facility]  Date of Discharge? 2021-11-23  Discharge Location? Home  Reason for hospitalization as patient stated? heart surgery everything   went well, chest xrays were good. What question does the patient have, if applicable? no questions   ---------------------------------------------------------------------------  --------------  CALL BACK INFO  What is the best way for the office to contact you? OK to leave message on   voicemail  Preferred Call Back Phone Number? [phone]  ---------------------------------------------------------------------------  --------------  SCRIPT ANSWERS  Relationship to Patient?  Self

## 2021-12-20 NOTE — PROGRESS NOTES
CT/CV Surgery Follow Up Office Visit      Patient's Name/Date of Birth: Sera Crouch [birth year] (22 y.o.)    Cardiologist:     PCP: NATALY Hernadez CNP    CC:   Post op follow up    Date: December 20, 2021    We had the pleasure of seeing Sera Crouch in the office today, as you know this is a very pleasant 78y.o. year old female with a history of aortic stenosis who underwent Aortic valve replacement with a 25 mm [address] bioprosthesis on 11/19/2021. She is here today for follow up appointment s/p AVR. The pt denies chest pressure, SOB, fever, chills, N/V/D. PastMedical History:  Julissa Renee  has a past medical history of Anemia, COPD (chronic obstructive pulmonary disease) ([address] and Valvular heart disease. Past Surgical History:  The patient  has a past surgical history that includes Total hip arthroplasty (2012); hip surgery (Right, 2012); fracture surgery (Left, 2020); and Aortic valve replacement (N/A, 11/19/2021). Allergies: The patient has No Known Allergies.     Medications:    Current Outpatient Medications:     aspirin 325 MG EC tablet, Take 1 tablet by mouth daily, Disp: 30 tablet, Rfl: 3    amiodarone (CORDARONE) 200 MG tablet, Take 1 tablet by mouth daily, Disp: 30 tablet, Rfl: 0    metoprolol tartrate (LOPRESSOR) 25 MG tablet, Take 1 tablet by mouth 2 times daily, Disp: 60 tablet, Rfl: 3    furosemide (LASIX) 40 MG tablet, Take 1 tablet by mouth daily, Disp: 60 tablet, Rfl: 3    potassium chloride (KLOR-CON M) 20 MEQ extended release tablet, Take 1 tablet by mouth 2 times daily (with meals), Disp: 60 tablet, Rfl: 3    calcium carbonate (OSCAL) 500 MG TABS tablet, Take 500 mg by mouth daily, Disp: , Rfl:     ferrous sulfate (IRON 325) 325 (65 Fe) MG tablet, Take 325 mg by mouth Daily with lunch , Disp: , Rfl:     Multiple Vitamins-Minerals (PRESERVISION AREDS PO), Take 2 tablets by mouth daily, Disp: , Rfl:     fluticasone (FLONASE) 50 MCG/ACT nasal spray, 1 spray by Each Nostril route daily (Patient taking differently: 1 spray by Each Nostril route daily as needed ), Disp: 2 Bottle, Rfl: 1    Multiple Vitamin TABS, Take by mouth daily, Disp: , Rfl:     Biotin 1000 MCG TABS, Take by mouth daily, Disp: , Rfl:     Family History: This patient's family history includes Cancer in her father; Kidney Disease in her mother. Social History:  José Bonilla  reports that she quit smoking about 9 months ago. Her smoking use included cigarettes. She started smoking about 50 years ago. She has a 48.00 pack-year smoking history. She has never used smokeless tobacco. She reports current alcohol use. She reports that she does not use drugs. Vital Signs:   /68   Pulse 78   Ht 5' 4\" (1.626 m)   Wt 102 lb 9.6 oz (46.5 kg)   SpO2 100%   BMI 17.61 kg/m²     ROS:   Constitutional: Negative for activity change, chills, fatigue, fever and unexpected weight change. Respiratory: Negative for apnea, shortness of breath, wheezing and stridor. Cardiovascular: Negative for chest pain, palpitations and leg swelling. Gastrointestinal: Negative for hematochezia, melana, constipation, and N/V/D. Musculoskeletal: Negative for myalgias  Skin: Negative for color change, rash and wound. Neurological: Negative for dizziness or syncope. Physical Exam:  General appearance:  No acute distress, appears stated age and cooperative. Neck: No jugular venous distention. Trachea midline. No carotid bruits. Respiratory:  Normal respiratory effort. Clear to auscultation, bilaterally without Rales/Wheezes/Rhonch. Cardiovascular:  Regular rate and rhythm with normal S1/S2 without murmurs, rubs or gallops. Abdomen: Soft, non-tender, non-distended with normal bowel sounds. Ext: No clubbing, cyanosis or edema bilaterally. Full range of motion without deformity. Skin: Skin color, texture, turgor normal.  No rashes or lesions.   Neurologic:  Neurovascularly intact without any focal sensory/motor deficits. Psychiatric:  Alert and oriented, thought content appropriate, normal insight. Capillary Refill: Brisk,< 3 seconds   Peripheral Pulses: +2 palpable, equal bilaterally     Labs:    CBC:  Lab Results   Component Value Date    WBC 4.5 12/07/2021    HGB 10.1 12/07/2021    HCT 32.7 12/07/2021    MCV 99.1 12/07/2021     12/07/2021    INR 1.07 11/15/2021     BMP:   Lab Results   Component Value Date     12/07/2021    K 4.6 12/07/2021    CL 98 12/07/2021    CO2 26 12/07/2021    PHOS 3.3 06/11/2012    BUN 33 12/07/2021    CREATININE 1.1 12/07/2021    MG 1.9 11/23/2021       Imaging  CXR PA & LATERAL: Pt getting CXR after appointment today    Active Problem List:  Patient Active Problem List   Diagnosis    Stopped smoking between 1 and 3 months ago    Family history of breast cancer in sister    History of smoking 30 or more pack years    Severe aortic insufficiency    S/P AVR       Assessment/Plan 12/20/21:  1) Continue current medical therapy. D/c'd Amiodarone and Lasix. 2) Labs reviewed - pt did not get CXR prior to visit. Will get CXR directly after office visit today. 3)Sternal precautions are still in place for 2 full months postop with no heavy lifting, but they are cleared to start driving locally. 4) Rediscussed cardiac rehab. 5) Pt to follow up with her oncologist for anemia and recent wt loss prior to surgery. 6.) Follow up with CT surgery as needed. Thank you for allowing us to be involved in the patient's care.     Electronically by BERNARD Young  on 12/20/2021 at 10:27 AM

## 2022-01-07 DIAGNOSIS — D64.9 NORMOCYTIC ANEMIA: Primary | ICD-10-CM

## 2022-01-11 ENCOUNTER — OFFICE VISIT (OUTPATIENT)
Dept: FAMILY MEDICINE CLINIC | Age: 67
End: 2022-01-11
Payer: MEDICARE

## 2022-01-11 ENCOUNTER — OFFICE VISIT (OUTPATIENT)
Dept: ONCOLOGY | Age: 67
End: 2022-01-11
Payer: MEDICARE

## 2022-01-11 ENCOUNTER — HOSPITAL ENCOUNTER (OUTPATIENT)
Dept: INFUSION THERAPY | Age: 67
Discharge: HOME | End: 2022-01-11
Payer: MEDICARE

## 2022-01-11 VITALS
BODY MASS INDEX: 18.99 KG/M2 | RESPIRATION RATE: 16 BRPM | SYSTOLIC BLOOD PRESSURE: 120 MMHG | HEART RATE: 79 BPM | DIASTOLIC BLOOD PRESSURE: 64 MMHG | TEMPERATURE: 97.6 F | WEIGHT: 107.2 LBS

## 2022-01-11 VITALS
WEIGHT: 105.6 LBS | BODY MASS INDEX: 18.71 KG/M2 | TEMPERATURE: 97.8 F | OXYGEN SATURATION: 100 % | SYSTOLIC BLOOD PRESSURE: 115 MMHG | DIASTOLIC BLOOD PRESSURE: 57 MMHG | HEIGHT: 63 IN | RESPIRATION RATE: 16 BRPM | HEART RATE: 72 BPM

## 2022-01-11 DIAGNOSIS — D50.9 IRON DEFICIENCY ANEMIA, UNSPECIFIED IRON DEFICIENCY ANEMIA TYPE: Primary | ICD-10-CM

## 2022-01-11 DIAGNOSIS — D64.9 NORMOCYTIC ANEMIA: ICD-10-CM

## 2022-01-11 DIAGNOSIS — M25.571 CHRONIC PAIN OF RIGHT ANKLE: Primary | ICD-10-CM

## 2022-01-11 DIAGNOSIS — G89.29 CHRONIC PAIN OF RIGHT ANKLE: Primary | ICD-10-CM

## 2022-01-11 LAB
ABSOLUTE RETIC #: 85 THOU/MM3 (ref 20–115)
ANISOCYTOSIS: PRESENT
ATYPICAL LYMPHOCYTES: ABNORMAL %
BASOPHILS # BLD: 1 %
BASOPHILS ABSOLUTE: 0 THOU/MM3 (ref 0–0.1)
CRENATED RBC'S: ABNORMAL
EOSINOPHIL # BLD: 2.3 %
EOSINOPHILS ABSOLUTE: 0.1 THOU/MM3 (ref 0–0.4)
ERYTHROCYTE [DISTWIDTH] IN BLOOD BY AUTOMATED COUNT: 17.2 % (ref 11.5–14.5)
ERYTHROCYTE [DISTWIDTH] IN BLOOD BY AUTOMATED COUNT: 65.1 FL (ref 35–45)
FERRITIN: 127 NG/ML (ref 10–291)
HCT VFR BLD CALC: 33.9 % (ref 37–47)
HEMOGLOBIN: 10.4 GM/DL (ref 12–16)
IMMATURE GRANS (ABS): 0.01 THOU/MM3 (ref 0–0.07)
IMMATURE GRANULOCYTES: 0.3 %
IMMATURE RETIC FRACT: 13.1 % (ref 3–15.9)
LYMPHOCYTES # BLD: 45 %
LYMPHOCYTES ABSOLUTE: 1.8 THOU/MM3 (ref 1–4.8)
MCH RBC QN AUTO: 31.7 PG (ref 26–33)
MCHC RBC AUTO-ENTMCNC: 30.7 GM/DL (ref 32.2–35.5)
MCV RBC AUTO: 103.4 FL (ref 81–99)
MONOCYTES # BLD: 23.1 %
MONOCYTES ABSOLUTE: 0.9 THOU/MM3 (ref 0.4–1.3)
NUCLEATED RED BLOOD CELLS: 0 /100 WBC
PLATELET # BLD: 170 THOU/MM3 (ref 130–400)
PMV BLD AUTO: 10.6 FL (ref 9.4–12.4)
POIKILOCYTES: ABNORMAL
RBC # BLD: 3.28 MILL/MM3 (ref 4.2–5.4)
RETIC HEMOGLOBIN: 35.6 PG (ref 28.2–35.7)
RETICULOCYTE ABSOLUTE COUNT: 2.6 % (ref 0.5–2)
SCAN OF BLOOD SMEAR: NORMAL
SEG NEUTROPHILS: 28.3 %
SEGMENTED NEUTROPHILS ABSOLUTE COUNT: 1.1 THOU/MM3 (ref 1.8–7.7)
WBC # BLD: 3.9 THOU/MM3 (ref 4.8–10.8)

## 2022-01-11 PROCEDURE — 99214 OFFICE O/P EST MOD 30 MIN: CPT | Performed by: NURSE PRACTITIONER

## 2022-01-11 PROCEDURE — 99211 OFF/OP EST MAY X REQ PHY/QHP: CPT

## 2022-01-11 PROCEDURE — 85025 COMPLETE CBC W/AUTO DIFF WBC: CPT

## 2022-01-11 PROCEDURE — 82728 ASSAY OF FERRITIN: CPT

## 2022-01-11 PROCEDURE — 36415 COLL VENOUS BLD VENIPUNCTURE: CPT

## 2022-01-11 PROCEDURE — 85046 RETICYTE/HGB CONCENTRATE: CPT

## 2022-01-11 RX ORDER — ASPIRIN 81 MG/1
81 TABLET ORAL DAILY
COMMUNITY

## 2022-01-11 ASSESSMENT — ENCOUNTER SYMPTOMS
ALLERGIC/IMMUNOLOGIC NEGATIVE: 1
DIARRHEA: 0
NAUSEA: 0
CONSTIPATION: 0
SHORTNESS OF BREATH: 0
VOMITING: 0
SORE THROAT: 0
ABDOMINAL PAIN: 0
WHEEZING: 0
EYE REDNESS: 0
RHINORRHEA: 0
TROUBLE SWALLOWING: 0
BACK PAIN: 0
EYE DISCHARGE: 0
EYE PAIN: 0
COUGH: 0

## 2022-01-11 NOTE — PATIENT INSTRUCTIONS
1.  Pt wishes to have PCP follow her iron deficiency anemia due to cost.  Follow up with PCP. Recommend checking CBC and iron panel every 3 mos until normalized, then can extend follow up appointments to 6 mos.

## 2022-01-11 NOTE — PROGRESS NOTES
[facility], [address] [address],[address]  [address] [address]  Dept: [phone]  Dept Fax: [phone]: [phone]      Visit Date:1/11/2022     Ale Hoang is a 78 y.o. female who presents today for:   Chief Complaint   Patient presents with    Follow-up     Normocytic anemia        HPI:   Ale Hoang is a 78 y.o. female who follows in our office for iron deficiency anemia. Per Roberto Billings note on 10/11/2021:  The patient had lab work completed on 8/31/2021 with CBC showing Hgb 9.3, Hct 29.1, MCV 95. WBC count 4.7, platelet count 955. She had iron studies on 9/2/21 showing iron 26, iron sat 14%, TIBC 183. Vitamin B12 within normal limits. She was recommended over-the-counter iron supplementation daily and referred for further evaluation. The patient denies any abnormal bleeding. Denies epistaxis, hemoptysis, hematemesis, melena, hematochezia, hematuria or vaginal bleeding. She denies prior history of EGD or colonoscopy. Patient states she has Cologuard testing annually with last 1 year ago which was negative. The patient denies history of malabsorptive disorder such as IBD or celiac disease. She denies prior history of gastrointestinal surgeries. The patient affirms adequate intake of iron in her diet. She has been taking oral iron supplementation 2 times daily for the last 4 weeks. She denies recent increase in fatigue. She affirms intermittent lightheadedness, dizziness, chest pain, shortness of breath, and palpitations. The patient attributes this to known aortic regurgitation and is following with Cardiology. Her PMH includes aortic regurgitation. The patient is a current smoker of 1 ppd and drinks 3 beers nightly.       Interval History 1/11/2022:   Pt presents to the office today for follow up and evaluation of iron deficiency anemia. Pt reports her symptoms have resolved since having open heart surgery on 11/19/2021.   She denies lightheadedness, dizziness, CP, SOB, heart palpitations, fatigue, weakness. She continues on oral iron once per day. She wishes to have her PCP follow her iron deficiency anemia due to cost.        PMH, SH, and FH:  I reviewed the patient's medication and allergy lists as noted on the electronic medical record. The PMH, SH, and FH were also reviewed as noted on the EMR.         Past Medical History:   Diagnosis Date    Anemia     COPD (chronic obstructive pulmonary disease) ([address]     Valvular heart disease 2021    Aortic Valve      Past Surgical History:   Procedure Laterality Date    AORTIC VALVE REPLACEMENT N/A 2021    AORTIC VALVE REPLACEMENT performed by Yana Martin MD at [address] Left     OIO     HIP SURGERY Right     TOTAL HIP ARTHROPLASTY      RTh        Family History   Problem Relation Age of Onset    Kidney Disease Mother     Cancer Father         Bone Cancer      Social History     Tobacco Use    Smoking status: Former Smoker     Packs/day: 1.00     Years: 48.00     Pack years: 48.00     Types: Cigarettes     Start date: 1972     Quit date: 3/17/2021     Years since quittin.8    Smokeless tobacco: Never Used   Substance Use Topics    Alcohol use: Yes     Comment: 4 beers daily      Current Outpatient Medications   Medication Sig Dispense Refill    aspirin 325 MG EC tablet Take 1 tablet by mouth daily 30 tablet 3    metoprolol tartrate (LOPRESSOR) 25 MG tablet Take 1 tablet by mouth 2 times daily 60 tablet 3    calcium carbonate (OSCAL) 500 MG TABS tablet Take 500 mg by mouth daily      ferrous sulfate (IRON 325) 325 (65 Fe) MG tablet Take 325 mg by mouth Daily with lunch       Multiple Vitamins-Minerals (PRESERVISION AREDS PO) Take 2 tablets by mouth daily      fluticasone (FLONASE) 50 MCG/ACT nasal spray 1 spray by Each Nostril route daily (Patient taking differently: 1 spray by Each Nostril route daily as needed ) 2 Bottle 1    Multiple Vitamin TABS Take by mouth daily      Biotin 1000 MCG TABS Take by mouth daily       No current facility-administered medications for this visit. No Known Allergies       Review of Systems:   Review of Systems   Pertinent review of systems noted in HPI, all other ROS negative. Objective:   Physical Exam   BP (!) 115/57 (Site: Left Upper Arm, Position: Sitting, Cuff Size: Medium Adult)   Pulse 72   Temp 97.8 °F (36.6 °C) (Oral)   Resp 16   Ht 5' 3\" (1.6 m)   Wt 105 lb 9.6 oz (47.9 kg)   SpO2 100%   BMI 18.71 kg/m²    General appearance: No apparent distress, calm and cooperative. Thin. HEENT: Pupils equal, round, and reactive to light. Conjunctivae/corneas clear. Oral mucosa moist.  Neck: Supple, with full range of motion. Trachea midline. Respiratory:  Normal respiratory effort. Clear to auscultation, bilaterally diminished. Cardiovascular:  RRR, S1/S2. Abdomen: Soft, non-tender, non-distended with active BS x 4. Musculoskeletal: No clubbing, cyanosis or edema bilaterally. Pt able to ambulate in office without difficulty or use of assistive device. Skin: Skin color, texture, turgor normal.  No visible rashes or lesions. Neurologic:  Neurovascularly intact without any focal sensory/motor deficits.  Cranial nerves: II-XII intact, grossly non-focal.  Psychiatric: Alert and oriented x 3, thought content appropriate, normal insight  Capillary Refill: Brisk,< 3 seconds   Peripheral Pulses: +2 palpable, equal bilaterally       Imaging Studies and Labs:   CBC:   Lab Results   Component Value Date    WBC 4.5 (L) 12/07/2021    HGB 10.1 (L) 12/07/2021    HCT 32.7 (L) 12/07/2021    MCV 99.1 (H) 12/07/2021     12/07/2021     BMP:   Lab Results   Component Value Date     12/07/2021    K 4.6 12/07/2021    CL 98 12/07/2021    CO2 26 12/07/2021    BUN 33 12/07/2021    CREATININE 1.1 12/07/2021    GLUCOSE 104 12/07/2021    CALCIUM 9.2 12/07/2021      LFT:   Lab Results   Component Value Date ALT 18 06/23/2020    AST 22 06/23/2020    ALKPHOS 71 06/23/2020    BILITOT 0.4 06/23/2020         Assessment and Plan:     1. Iron deficiency anemia, unspecified iron deficiency anemia type  Normocytic anemia found on labwork on 8/31/2021 with H/H 9.3/29.1, MCV 95. Pt was placed on oral iron BID. Occult stool 9/3/2021 (-). Labs on 10/11/2021:  H/H 10.7/35.5, , Iron panel WNL, Vit B12/folate WNL. Pt had open heart surgery on 11/19/2021 and reports all symptoms have resolved. H/H today 10.1/32.7. MCV 99.1. Ferritin, iron pending. Pt states she no longer wishes to follow in our office d/t cost.  She states she will have PCP monitor iron levels. No follow-ups on file. All patient questions answered. Pt voiced understanding. Patient agreed with treatment plan. Follow up as directed. Patient instructed to call for questions or concerns. Electronically signed by   NATALY Vargas CNP     I spent a total of 34 minutes on the day of the visit.

## 2022-01-11 NOTE — PROGRESS NOTES
[facility]  [address] [place] [facility] [address]  Dept: [phone]  Dept Fax: [phone]  Loc: [phone]     Visit Date:  1/11/2022      Patient:  Cece Pal  YOB: 1955    HPI:     Chief Complaint   Patient presents with    Ankle Pain     right ankle tightness for several weeks, bothers her while sleeping, painful, decreased ROM    Discuss Labs     seeing hematology for anemia, costing her too much, willing to manage? Patient presents with right ankle pain, tightness, and trouble with gait due to the symptoms. She states the pain is in the top of her foot and the ankle of the ankle. She states she is having trouble rolling her foot inward, as compared to her left foot. No color change but does complain of mild edema. Patient also has been to hematology several times and they have diagnosed her with normocytic anemia and she is taking iron. She would like us to monitor her anemia at this point going forward if at all possible due to the cost of specialty care. States she was at hematology today and was told her levels are now normal but she should continue the iron.       Medications    Current Outpatient Medications:     aspirin 81 MG EC tablet, Take 81 mg by mouth daily, Disp: , Rfl:     metoprolol tartrate (LOPRESSOR) 25 MG tablet, Take 1 tablet by mouth 2 times daily, Disp: 60 tablet, Rfl: 3    calcium carbonate (OSCAL) 500 MG TABS tablet, Take 500 mg by mouth daily, Disp: , Rfl:     ferrous sulfate (IRON 325) 325 (65 Fe) MG tablet, Take 325 mg by mouth Daily with lunch , Disp: , Rfl:     Multiple Vitamins-Minerals (PRESERVISION AREDS PO), Take 2 tablets by mouth daily, Disp: , Rfl:     fluticasone (FLONASE) 50 MCG/ACT nasal spray, 1 spray by Each Nostril route daily (Patient taking differently: 1 spray by Each Nostril route daily as needed ), Disp: 2 Bottle, Rfl: 1    Multiple Vitamin TABS, Take by mouth daily, Disp: , Rfl:     Biotin 1000 MCG TABS, Take by mouth daily, Disp: , Rfl:     The patient has No Known Allergies. Past Medical History  Sanchez Steel  has a past medical history of Anemia, COPD (chronic obstructive pulmonary disease) ([address] and Valvular heart disease. Subjective:      Review of Systems   Constitutional: Negative for activity change, fatigue and fever. HENT: Negative for congestion, ear pain, rhinorrhea, sore throat and trouble swallowing. Eyes: Negative for pain, discharge and redness. Respiratory: Negative for cough, shortness of breath and wheezing. Cardiovascular: Negative. Gastrointestinal: Negative for abdominal pain, constipation, diarrhea, nausea and vomiting. Endocrine: Negative. Genitourinary: Negative for dysuria, frequency and urgency. Musculoskeletal: Positive for arthralgias and joint swelling. Negative for back pain and myalgias. Skin: Negative for rash. Allergic/Immunologic: Negative. Neurological: Negative for dizziness, tremors, weakness and headaches. Hematological: Negative. Psychiatric/Behavioral: Negative for dysphoric mood and sleep disturbance. The patient is not nervous/anxious. Objective:     /64 (Site: Right Upper Arm)   Pulse 79   Temp 97.6 °F (36.4 °C) (Oral)   Resp 16   Wt 107 lb 3.2 oz (48.6 kg)   BMI 18.99 kg/m²     Physical Exam  Constitutional:       General: She is not in acute distress. Appearance: Normal appearance. She is well-developed. She is not diaphoretic. HENT:      Right Ear: Hearing and external ear normal.      Left Ear: Hearing and external ear normal.      Nose: Nose normal. No mucosal edema or rhinorrhea. Mouth/Throat:      Mouth: Mucous membranes are moist.      Dentition: Normal dentition. Pharynx: Uvula midline. No posterior oropharyngeal erythema. Tonsils: No tonsillar exudate. 0 on the right. 0 on the left.    Eyes:      General: Lids are normal.         Right eye: No discharge. Left eye: No discharge. Conjunctiva/sclera: Conjunctivae normal.      Right eye: Right conjunctiva is not injected. No chemosis. Left eye: No chemosis. Pupils: Pupils are equal, round, and reactive to light. Neck:      Vascular: No JVD. Trachea: Trachea normal.   Cardiovascular:      Rate and Rhythm: Normal rate and regular rhythm. Heart sounds: Normal heart sounds. No murmur heard. Pulmonary:      Effort: Pulmonary effort is normal. No respiratory distress. Breath sounds: Normal breath sounds. No stridor. Musculoskeletal:         General: Swelling and tenderness present. No deformity or signs of injury. Normal range of motion. Cervical back: Full passive range of motion without pain and normal range of motion. Right lower leg: No edema. Left lower leg: No edema. Skin:     General: Skin is warm and dry. Capillary Refill: Capillary refill takes less than 2 seconds. Coloration: Skin is not pale. Findings: No erythema or rash. Neurological:      Mental Status: She is alert and oriented to person, place, and time. GCS: GCS eye subscore is 4. GCS verbal subscore is 5. GCS motor subscore is 6. Cranial Nerves: No cranial nerve deficit. Coordination: Coordination normal.      Gait: Gait normal.   Psychiatric:         Mood and Affect: Mood is not anxious or depressed. Speech: Speech normal.         Behavior: Behavior normal. Behavior is not withdrawn or hyperactive. Behavior is cooperative. Thought Content: Thought content normal.         Judgment: Judgment normal.         Assessment/Plan:      Serenity Carlin was seen today for ankle pain and discuss labs. Diagnoses and all orders for this visit:    Chronic pain of right ankle  -     XR ANKLE RIGHT (MIN 3 VIEWS); Future    Normocytic anemia  -     Iron;  Future  -     CBC With Auto Differential; Future        Return if symptoms worsen or fail to improve. Patient instructions given andreviewed.         Electronically signed by NATALY Cohen CNP on 1/11/2022 at 2:56 PM

## 2022-01-11 NOTE — PATIENT INSTRUCTIONS
Patient Education        Anemia: Care Instructions  Your Care Instructions     Anemia is a low level of red blood cells, which carry oxygen throughout your body. Many things can cause anemia. Lack of iron is one of the most common causes. Your body needs iron to make hemoglobin, a substance in red blood cells that carries oxygen from the lungs to your body's cells. Without enough iron, the body produces fewer and smaller red blood cells. As a result, your body's cells do not get enough oxygen, and you feel tired and weak. And you may have trouble concentrating. Bleeding is the most common cause of a lack of iron. You may have heavy menstrual bleeding or bleeding caused by conditions such as ulcers, hemorrhoids, or cancer. Regular use of aspirin or other anti-inflammatory medicines (such as ibuprofen) also can cause bleeding in some people. A lack of iron in your diet also can cause anemia, especially at times when the body needs more iron, such as during pregnancy, infancy, and the teen years. Your doctor may have prescribed iron pills. It may take several months of treatment for your iron levels to return to normal. Your doctor also may suggest that you eat foods that are rich in iron, such as meat and beans. There are many other causes of anemia. It is not always due to a lack of iron. Finding the specific cause of your anemia will help your doctor find the right treatment for you. Follow-up care is a key part of your treatment and safety. Be sure to make and go to all appointments, and call your doctor if you are having problems. It's also a good idea to know your test results and keep a list of the medicines you take. How can you care for yourself at home? · Take your medicines exactly as prescribed. Call your doctor if you think you are having a problem with your medicine.   · If your doctor recommends iron pills, take them as directed:  ? Try to take the pills on an empty stomach about 1 hour before or increased shortness of breath.     · You are dizzy or lightheaded, or you feel like you may faint.     · Your fatigue and weakness continue or get worse.     · You have any abnormal bleeding, such as:  ? Nosebleeds. ? Vaginal bleeding that is different (heavier, more frequent, at a different time of the month) than what you are used to.  ? Bloody or black stools, or rectal bleeding. ? Bloody or pink urine. Watch closely for changes in your health, and be sure to contact your doctor if:    · You do not get better as expected. Where can you learn more? Go to https://Hansen And Sonpepiceweb.IMN. org and sign in to your PlanetEye account. Enter R301 in the Quadriserv box to learn more about \"Anemia: Care Instructions. \"     If you do not have an account, please click on the \"Sign Up Now\" link. Current as of: April 29, 2021               Content Version: 13.1  © 2006-2021 [employer]. Care instructions adapted under license by [facility] ([facility]). If you have questions about a medical condition or this instruction, always ask your healthcare professional. [place] [address] any warranty or liability for your use of this information. Patient Education        Joint Pain: Care Instructions  Your Care Instructions     Many people have small aches and pains from overuse or injury to muscles and joints. Joint injuries often happen during sports or recreation, work tasks, or projects around the home. An overuse injury can happen when you put too much stress on a joint or when you do an activity that stresses the joint over and over, such as using the computer or rowing a boat. You can take action at home to help your muscles and joints get better. You should feel better in 1 to 2 weeks, but it can take 3 months or more to heal completely. Follow-up care is a key part of your treatment and safety.  Be sure to make and go to all appointments, and call your doctor if you are having problems. It's also a good idea to know your test results and keep a list of the medicines you take. How can you care for yourself at home? · Do not put weight on the injured joint for at least a day or two. · For the first day or two after an injury, do not take hot showers or baths, and do not use hot packs. The heat could make swelling worse. · Put ice or a cold pack on the sore joint for 10 to 20 minutes at a time. Try to do this every 1 to 2 hours for the next 3 days (when you are awake) or until the swelling goes down. Put a thin cloth between the ice and your skin. · Wrap the injury in an elastic bandage. Do not wrap it too tightly because this can cause more swelling. · Prop up the sore joint on a pillow when you ice it or anytime you sit or lie down during the next 3 days. Try to keep it above the level of your heart. This will help reduce swelling. · Take an over-the-counter pain medicine, such as acetaminophen (Tylenol), ibuprofen (Advil, Motrin), or naproxen (Aleve). Read and follow all instructions on the label. · After 1 or 2 days of rest, begin moving the joint gently. While the joint is still healing, you can begin to exercise using activities that do not strain or hurt the painful joint. When should you call for help? Call your doctor now or seek immediate medical care if:    · You have signs of infection, such as:  ? Increased pain, swelling, warmth, and redness. ? Red streaks leading from the joint. ? A fever. Watch closely for changes in your health, and be sure to contact your doctor if:    · Your movement or symptoms are not getting better after 1 to 2 weeks of home treatment. Where can you learn more? Go to https://View and Cheweugenioeb.[employer]. org and sign in to your .com account. Enter P205 in the GreenSQL box to learn more about \"Joint Pain: Care Instructions. \"     If you do not have an account, please click on the \"Sign Up Now\" link.   Current as of: July 1, 2021               Content Version: 13.1  © 2919-1306 [employer]. Care instructions adapted under license by [facility] ([facility]). If you have questions about a medical condition or this instruction, always ask your healthcare professional. [address] any warranty or liability for your use of this information.

## 2022-01-25 ENCOUNTER — TELEPHONE (OUTPATIENT)
Dept: FAMILY MEDICINE CLINIC | Age: 67
End: 2022-01-25

## 2022-01-25 NOTE — TELEPHONE ENCOUNTER
----- Message from Kieran Cabot sent at 1/25/2022  9:02 AM EST -----  Subject: Message to Provider    QUESTIONS  Information for Provider? Patient is calling in and letting the DR know   that she is wearing compression socks, and she is not going to get her   xray of the right ankle, she is getting better she states. She stated that   she is going to get her blood work and that was it. If you have any   questions to please call her.  ---------------------------------------------------------------------------  --------------  CALL BACK INFO  What is the best way for the office to contact you? OK to leave message on   voicemail  Preferred Call Back Phone Number? [phone]  ---------------------------------------------------------------------------  --------------  SCRIPT ANSWERS  Relationship to Patient?  Self

## 2022-01-31 NOTE — TELEPHONE ENCOUNTER
Results of DEREK    Ejection fraction was estimated at 35-40%. APPENDAGE: The left atrial appendage size was normal with no thrombus   identified. DOPPLER: The function was normal (normal emptying velocity). 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 There was no   echocardiographic evidence of a vegetation. DOPPLER: Transaortic velocity   was within the normal range with no evidence of aortic stenosis. There is   severe aortic regurgitation. There was mild mitral regurgitation. Looks like patient is going to be set up for an outpatient cardiac cath. No

## 2022-03-17 ENCOUNTER — OFFICE VISIT (OUTPATIENT)
Dept: CARDIOLOGY CLINIC | Age: 67
End: 2022-03-17
Payer: MEDICARE

## 2022-03-17 VITALS
HEIGHT: 63 IN | BODY MASS INDEX: 18.85 KG/M2 | HEART RATE: 86 BPM | SYSTOLIC BLOOD PRESSURE: 128 MMHG | WEIGHT: 106.4 LBS | DIASTOLIC BLOOD PRESSURE: 62 MMHG

## 2022-03-17 DIAGNOSIS — Z95.3 FOLLOW-UP VISIT FOR AORTIC VALVE REPLACEMENT WITH BIOPROSTHETIC VALVE: Primary | ICD-10-CM

## 2022-03-17 DIAGNOSIS — Z09 FOLLOW-UP VISIT FOR AORTIC VALVE REPLACEMENT WITH BIOPROSTHETIC VALVE: Primary | ICD-10-CM

## 2022-03-17 PROCEDURE — 99213 OFFICE O/P EST LOW 20 MIN: CPT | Performed by: INTERNAL MEDICINE

## 2022-03-17 PROCEDURE — 93000 ELECTROCARDIOGRAM COMPLETE: CPT | Performed by: INTERNAL MEDICINE

## 2022-03-17 NOTE — PROGRESS NOTES
[id number] [facility] [address]  [facility] [address]  Dept: [phone]  Dept Fax: [phone]  Loc: [phone]    Visit Date: 3/17/2022    Ms. Marcia Mehta is a 78 y.o. female  who presented for:  Chief Complaint   Patient presents with    6 Month Follow-Up    Cardiac Valve Problem     AR       HPI:   HPI     77 yo F with a hx of Severe Aortic Regurgitation s/p SAVR who presents for follow-up. Patient underwent SAVR by Dr. Jericho Mireles on 11/19/2021 for severe aortic regurgitation on DEREK with EF 35-40%. She reports feeling well since the procedure. She is currently on ASA 81 and Lopressor 25 BID. No chest pain, orthopnea, PND, VALENTINE, palpitations, or syncope. No longer has leg swelling. Current Outpatient Medications:     aspirin 81 MG EC tablet, Take 81 mg by mouth daily, Disp: , Rfl:     metoprolol tartrate (LOPRESSOR) 25 MG tablet, Take 1 tablet by mouth 2 times daily, Disp: 60 tablet, Rfl: 3    calcium carbonate (OSCAL) 500 MG TABS tablet, Take 500 mg by mouth daily, Disp: , Rfl:     ferrous sulfate (IRON 325) 325 (65 Fe) MG tablet, Take 325 mg by mouth Daily with lunch , Disp: , Rfl:     Multiple Vitamins-Minerals (PRESERVISION AREDS PO), Take 2 tablets by mouth daily, Disp: , Rfl:     fluticasone (FLONASE) 50 MCG/ACT nasal spray, 1 spray by Each Nostril route daily (Patient taking differently: 1 spray by Each Nostril route daily as needed ), Disp: 2 Bottle, Rfl: 1    Multiple Vitamin TABS, Take by mouth daily, Disp: , Rfl:     Biotin 1000 MCG TABS, Take by mouth daily, Disp: , Rfl:     Past Medical History  Malena Morillo  has a past medical history of Anemia, COPD (chronic obstructive pulmonary disease) ([address] and Valvular heart disease. Social History  Malena Morillo  reports that she quit smoking about a year ago. Her smoking use included cigarettes. She started smoking about 50 years ago. She has a 48.00 pack-year smoking history.  She has never used smokeless tobacco. She reports current alcohol use. She reports that she does not use drugs. Family History  Serenity Carlin family history includes Cancer in her father; Kidney Disease in her mother. There is no family history of bicuspid aortic valve, aneurysms, heart transplant, pacemakers, defibrillators, or sudden cardiac death. Past Surgical History   Past Surgical History:   Procedure Laterality Date    AORTIC VALVE REPLACEMENT N/A 11/19/2021    AORTIC VALVE REPLACEMENT performed by Steve Lacy MD at [address] Left 2020    OIO     HIP SURGERY Right 2012    TOTAL HIP ARTHROPLASTY  2012    RT         Review of Systems   Constitutional: Negative for chills and fever  HENT: Negative for congestion, sinus pressure, sneezing and sore throat. Eyes: Negative for pain, discharge, redness and itching. Respiratory: Negative for apnea, cough  Gastrointestinal: Negative for blood in stool, constipation, diarrhea   Endocrine: Negative for cold intolerance, heat intolerance, polydipsia. Genitourinary: Negative for dysuria, enuresis, flank pain and hematuria. Musculoskeletal: Negative for arthralgias, joint swelling and neck pain. Neurological: Negative for numbness and headaches. Psychiatric/Behavioral: Negative for agitation, confusion, decreased concentration and dysphoric mood. Objective:     /62   Pulse 86   Ht 5' 3\" (1.6 m)   Wt 106 lb 6.4 oz (48.3 kg)   BMI 18.85 kg/m²     Wt Readings from Last 3 Encounters:   03/17/22 106 lb 6.4 oz (48.3 kg)   01/11/22 107 lb 3.2 oz (48.6 kg)   01/11/22 105 lb 9.6 oz (47.9 kg)     BP Readings from Last 3 Encounters:   03/17/22 128/62   01/11/22 120/64   01/11/22 (!) 115/57       Nursing note and vitals reviewed. Physical Exam   Constitutional: Oriented to person, place, and time. Appears well-developed and well-nourished. HENT:   Head: Normocephalic and atraumatic.    Eyes: EOM are normal. Pupils are equal, round, and reactive to light. Neck: Normal range of motion. Neck supple. No JVD present. Cardiovascular: Normal rate, regular rhythm, normal heart sounds and intact distal pulses. No murmur heard. Pulmonary/Chest: Effort normal and breath sounds normal. No respiratory distress. No wheezes. No rales. Abdominal: Soft. Bowel sounds are normal. No distension. There is no tenderness. Musculoskeletal: Normal range of motion. No edema. Neurological: Alert and oriented to person, place, and time. No cranial nerve deficit. Coordination normal.   Skin: Skin is warm and dry. Psychiatric: Normal mood and affect.        No results found for: CKTOTAL, CKMB, CKMBINDEX    Lab Results   Component Value Date    WBC 3.9 01/11/2022    RBC 3.28 01/11/2022    HGB 10.4 01/11/2022    HCT 33.9 01/11/2022    .4 01/11/2022    MCH 31.7 01/11/2022    MCHC 30.7 01/11/2022    RDW 12.5 06/13/2012     01/11/2022    MPV 10.6 01/11/2022       Lab Results   Component Value Date     12/07/2021    K 4.6 12/07/2021    CL 98 12/07/2021    CO2 26 12/07/2021    BUN 33 12/07/2021    LABALBU 4.1 06/23/2020    CREATININE 1.1 12/07/2021    CALCIUM 9.2 12/07/2021    LABGLOM 49 12/07/2021    GLUCOSE 104 12/07/2021       Lab Results   Component Value Date    ALKPHOS 71 06/23/2020    ALT 18 06/23/2020    AST 22 06/23/2020    PROT 6.3 06/23/2020    BILITOT 0.4 06/23/2020    BILIDIR <0.2 06/23/2020    LABALBU 4.1 06/23/2020       Lab Results   Component Value Date    MG 1.9 11/23/2021       Lab Results   Component Value Date    INR 1.07 11/15/2021    INR 1.07 10/20/2021    INR 0.97 06/11/2012         Lab Results   Component Value Date    LABA1C 4.6 11/15/2021       Lab Results   Component Value Date    TRIG 30 10/20/2021    HDL 61 10/20/2021    LDLCALC 40 10/20/2021       No results found for: TSH      Testing Reviewed:      I have individually reviewed the cardiac test below:    ECHO: Results for orders placed during the hospital encounter of 09/09/21    Echocardiogram complete    Narrative  Transthoracic Echocardiography Report (TTE)    Demographics    Patient Name    Laurel Mcconnell  Gender               Female  M    MR #            [id number]      Race                     Ethnicity    Account #       [de-identified]      Room Number    Accession       [id number]     Date of Study        09/09/2021  Number    Date of Birth   1955     Referring Physician  Fredi Urbina    Age             78 year(s)     [address] [facility]    Interpreting         Echo reader of the  Physician            week  Lazaro Francis MD    Procedure    Type of Study    TTE procedure:ECHOCARDIOGRAM COMPLETE 2D W DOPPLER W COLOR. Procedure Date  Date: 09/09/2021 Start: 10:12 AM    Study Location: Echo Lab  Technical Quality: Adequate visualization    Indications:Lower extremity edema and Heart murmur. Additional Medical History: Former smoker, heart murmur    Patient Status: Routine    Height: 64 inches Weight: 108 pounds BSA: 1.51 m^2 BMI: 18.54 kg/m^2    BP: 122/58 mmHg    Conclusions    Summary  Ejection fraction is visually estimated at 50%. Overall left ventricular function is normal.  Aortic valve appears tricuspid. Thickened aortic valve leaflets noted. Aortic valve leaflets are Moderately calcified. Moderate-to-severe aortic regurgitation is noted. Mild aortic stenosis is present. Signature    ----------------------------------------------------------------  Electronically signed by Lazaro Francis MD (Interpreting  physician) on 09/09/2021 at 04:33 PM  ----------------------------------------------------------------    Findings    Mitral Valve  Mild mitral regurgitation is present. Aortic Valve  Aortic valve appears tricuspid. Thickened aortic valve leaflets noted. Aortic valve leaflets are Moderately calcified. Moderate-to-severe aortic regurgitation is noted.   Mild aortic stenosis is present. Tricuspid Valve  Mild tricuspid regurgitation visualized. Pulmonic Valve  The pulmonic valve leaflets exhibited normal thickness, no calcification,  and normal cuspal separation. DOPPLER: The transpulmonic velocity was  within the normal range with no evidence for regurgitation. Left Atrium  Left atrial size was normal.    Left Ventricle  Ejection fraction is visually estimated at 50%. Overall left ventricular function is normal.    Right Atrium  Right atrial size was normal.    Right Ventricle  The right ventricular size was normal with normal systolic function and  wall thickness. Pericardial Effusion  The pericardium was normal in appearance with no evidence of a pericardial  effusion. Pleural Effusion  No evidence of pleural effusion. Aorta / Great Vessels  -Aortic root dimension within normal limits.  -The Pulmonary artery is within normal limits. -IVC size is within normal limits with normal respiratory phasic changes.     M-Mode/2D Measurements & Calculations    LV Diastolic   LV Systolic Dimension:    AV Cusp Separation: 1.4 cmLA  Dimension: 5   3.6 cm                    Dimension: 3.2 cmAO Root  cm             LV Volume Diastolic: 299  Dimension: 3.7 cmLA Area: 18.2  LV FS:28 %     ml                        cm^2  LV PW          LV Volume Systolic: 53.3  Diastolic: 0.9 ml  cm             LV EDV/LV EDV Index: 118  Septum         ml/78 m^2LV ESV/LV ESV    RV Diastolic Dimension: 2.4 cm  Diastolic: 0.7 Index: 97.1 ml/36 m^2  cm             EF Calculated: 53.9 %     LA/Aorta: 0.86    LA volume/Index: 52.5 ml /35m^2    LVOT: 2.1 cm    Doppler Measurements & Calculations    MV Peak E-Wave:     AV Peak Velocity: 183   LVOT Peak Velocity: 131 cm/s  93.8 cm/s           cm/s                    LVOT Mean Velocity: 95.6 cm/s  MV Peak A-Wave: 59  AV Peak Gradient: 13.4  LVOT Peak Gradient: 7 mmHgLVOT  cm/s                mmHg                    Mean Gradient: 4 mmHg  MV E/A Ratio: 1.59  AV Mean Velocity: 135  MV Peak Gradient:   cm/s                    TV Peak E-Wave: 74.6 cm/s  3.52 mmHg           AV Mean Gradient: 8     TV Peak A-Wave: 42.4 cm/s  mmHg  MV Deceleration     AV VTI: 42 cm           TV Peak Gradient: 2.23 mmHg  Time: 201 msec      AV Area                 TR Velocity:271 cm/s  MV P1/2t: 59 msec   (Continuity):2.09 cm^2  TR Gradient:29.38 mmHg  MVA by PHT:3.73                             PV Peak Velocity: 55.7 cm/s  cm^2                LVOT VTI: 25.4 cm       PV Peak Gradient: 1.24 mmHg  AV P1/2t: 255 msec  MV E' Septal  Velocity: 11.9 cm/s  MV A' Septal  Velocity: 15.8 cm/s AV DVI (VTI): 0.6AV DVI  MV E' Lateral       (Vmax):0.72  Velocity: 12.1 cm/s  MV A' Lateral  Velocity: 7 cm/s  E/E' septal: 7.88  E/E' lateral: 7.75  MR Velocity: 404  cm/s    [email or web address] [id number]       Assessment/Plan   Severe Aortic Regurgitation S/p SAVR 11/19/2021  EF 35-40%  BRENDA on Iron Supplement  GERD    Doing significantly better since SAVR. Will need post-procedure TTE to evaluate aortic valve. Continue with ASA/Lopressor. Discussed diet/exercise/BP/lipids/lifestyle modification; the patient understands and will try to comply. Disposition:  1 year    Electronically signed by Tyshawn Bloom MD   3/17/2022 at 1:12 PM EDT    Attending Supervising Berny Dominguez  I performed a history and physical examination on the patient and discussed the management with the resident physician. I reviewed and agree with the findings and plan as documented in the resident's note except for as noted below. Doing well after SAVR for AI. EF 35-40%, doing well. Re-check TTE post surgery. If not improved will need to add GDMT/cardiomyopathy meds. Continue BB for the time being.   Discussed diet/exercise/BP/weight loss/health lifestyle choices/lipids; the patient understands the goals and will

## 2022-03-28 ENCOUNTER — TELEPHONE (OUTPATIENT)
Dept: ONCOLOGY | Age: 67
End: 2022-03-28

## 2022-03-30 ENCOUNTER — TELEPHONE (OUTPATIENT)
Dept: CARDIOLOGY CLINIC | Age: 67
End: 2022-03-30

## 2022-03-30 NOTE — TELEPHONE ENCOUNTER
Pre op Risk Assessment    Procedure DENTAL EXTRACTIONS  Physician [facility]  Date of surgery/procedure TBD    Last OV 3-  Last Stress NONE IN EPIC  Last Echo 3-17-22  Last Cath 10-20-21    Is patient on blood thinners ASA 81 MG  Hold Meds/how many days ? ?     [phone]

## 2022-04-01 ENCOUNTER — TELEPHONE (OUTPATIENT)
Dept: CARDIOTHORACIC SURGERY | Age: 67
End: 2022-04-01

## 2022-04-01 RX ORDER — AMOXICILLIN 500 MG/1
TABLET, FILM COATED ORAL
Qty: 4 TABLET | Refills: 0 | Status: SHIPPED | OUTPATIENT
Start: 2022-04-01 | End: 2022-07-18 | Stop reason: SDUPTHER

## 2022-04-01 NOTE — TELEPHONE ENCOUNTER
Jeremy Mohr needs pre-med before dental extractions-- date TBD  Clearance given by Dr. Franco Dolan  Rx pending  Please advise. Thanks!   Pharmacy [facility] on [address] in [place]

## 2022-04-11 ENCOUNTER — HOSPITAL ENCOUNTER (OUTPATIENT)
Dept: WOMENS IMAGING | Age: 67
Discharge: HOME | End: 2022-04-11
Payer: MEDICARE

## 2022-04-11 DIAGNOSIS — Z12.31 VISIT FOR SCREENING MAMMOGRAM: ICD-10-CM

## 2022-04-11 PROCEDURE — 77063 BREAST TOMOSYNTHESIS BI: CPT

## 2022-04-18 ENCOUNTER — HOSPITAL ENCOUNTER (OUTPATIENT)
Dept: NON INVASIVE DIAGNOSTICS | Age: 67
Discharge: HOME | End: 2022-04-18
Payer: MEDICARE

## 2022-04-18 DIAGNOSIS — Z95.3 FOLLOW-UP VISIT FOR AORTIC VALVE REPLACEMENT WITH BIOPROSTHETIC VALVE: ICD-10-CM

## 2022-04-18 DIAGNOSIS — Z09 FOLLOW-UP VISIT FOR AORTIC VALVE REPLACEMENT WITH BIOPROSTHETIC VALVE: ICD-10-CM

## 2022-04-18 LAB
LV EF: 53 %
LVEF MODALITY: NORMAL

## 2022-04-18 PROCEDURE — 93306 TTE W/DOPPLER COMPLETE: CPT

## 2022-06-30 ENCOUNTER — TELEPHONE (OUTPATIENT)
Dept: FAMILY MEDICINE CLINIC | Age: 67
End: 2022-06-30

## 2022-06-30 NOTE — TELEPHONE ENCOUNTER
----- Message from Carrington Subramanian sent at 6/30/2022 10:02 AM EDT -----  Subject: Message to Provider    QUESTIONS  Information for Provider? Pt had blood work done a while back and is   wondering if she should still take her iron supplement. Please call pt to   advise.  ---------------------------------------------------------------------------  --------------  CALL BACK INFO  What is the best way for the office to contact you? OK to leave message on   voicemail  Preferred Call Back Phone Number? [phone]  ---------------------------------------------------------------------------  --------------  SCRIPT ANSWERS  Relationship to Patient?  Self

## 2022-06-30 NOTE — TELEPHONE ENCOUNTER
ARIANNA stating her Iron was normal in march but if she was taking her supplement then that is what is helping her level. Informed her to call back to confirm if she was taking this at the time of the lab draw.

## 2022-07-14 ENCOUNTER — TELEPHONE (OUTPATIENT)
Dept: CARDIOTHORACIC SURGERY | Age: 67
End: 2022-07-14

## 2022-07-14 NOTE — TELEPHONE ENCOUNTER
Patient needs dental pre-med abx sent to pharmacy prior to cleaning scheduled for 8/22  Pharmacy is [facility] on [address]  Please advise.   S/p AVR 11/19/21

## 2022-07-18 RX ORDER — AMOXICILLIN 500 MG/1
TABLET, FILM COATED ORAL
Qty: 4 TABLET | Refills: 0 | Status: SHIPPED | OUTPATIENT
Start: 2022-07-18 | End: 2022-08-22

## 2022-08-02 ENCOUNTER — OFFICE VISIT (OUTPATIENT)
Dept: FAMILY MEDICINE CLINIC | Age: 67
End: 2022-08-02
Payer: MEDICARE

## 2022-08-02 VITALS
WEIGHT: 106 LBS | BODY MASS INDEX: 18.78 KG/M2 | SYSTOLIC BLOOD PRESSURE: 98 MMHG | DIASTOLIC BLOOD PRESSURE: 60 MMHG | HEART RATE: 96 BPM | RESPIRATION RATE: 16 BRPM | TEMPERATURE: 98.2 F | OXYGEN SATURATION: 94 %

## 2022-08-02 DIAGNOSIS — J43.9 PULMONARY EMPHYSEMA, UNSPECIFIED EMPHYSEMA TYPE (HCC): ICD-10-CM

## 2022-08-02 DIAGNOSIS — J30.2 SEASONAL ALLERGIES: Primary | ICD-10-CM

## 2022-08-02 PROCEDURE — 99213 OFFICE O/P EST LOW 20 MIN: CPT | Performed by: NURSE PRACTITIONER

## 2022-08-02 PROCEDURE — 1123F ACP DISCUSS/DSCN MKR DOCD: CPT | Performed by: NURSE PRACTITIONER

## 2022-08-02 RX ORDER — PREDNISONE 20 MG/1
20 TABLET ORAL 2 TIMES DAILY
Qty: 10 TABLET | Refills: 0 | Status: SHIPPED | OUTPATIENT
Start: 2022-08-02 | End: 2022-08-07

## 2022-08-02 SDOH — ECONOMIC STABILITY: FOOD INSECURITY: WITHIN THE PAST 12 MONTHS, YOU WORRIED THAT YOUR FOOD WOULD RUN OUT BEFORE YOU GOT MONEY TO BUY MORE.: NEVER TRUE

## 2022-08-02 SDOH — ECONOMIC STABILITY: FOOD INSECURITY: WITHIN THE PAST 12 MONTHS, THE FOOD YOU BOUGHT JUST DIDN'T LAST AND YOU DIDN'T HAVE MONEY TO GET MORE.: NEVER TRUE

## 2022-08-02 ASSESSMENT — ENCOUNTER SYMPTOMS
BACK PAIN: 0
DIARRHEA: 0
TROUBLE SWALLOWING: 0
RHINORRHEA: 1
COUGH: 0
SINUS PRESSURE: 1
EYE DISCHARGE: 0
SHORTNESS OF BREATH: 0
VOMITING: 0
EYE PAIN: 0
EYE ITCHING: 1
EYE REDNESS: 0
NAUSEA: 0
ALLERGIC/IMMUNOLOGIC NEGATIVE: 1
ABDOMINAL PAIN: 0
WHEEZING: 0
SORE THROAT: 0
CONSTIPATION: 0

## 2022-08-02 ASSESSMENT — PATIENT HEALTH QUESTIONNAIRE - PHQ9
SUM OF ALL RESPONSES TO PHQ QUESTIONS 1-9: 0
SUM OF ALL RESPONSES TO PHQ QUESTIONS 1-9: 0
SUM OF ALL RESPONSES TO PHQ9 QUESTIONS 1 & 2: 0
1. LITTLE INTEREST OR PLEASURE IN DOING THINGS: 0
SUM OF ALL RESPONSES TO PHQ QUESTIONS 1-9: 0
SUM OF ALL RESPONSES TO PHQ QUESTIONS 1-9: 0
2. FEELING DOWN, DEPRESSED OR HOPELESS: 0

## 2022-08-02 ASSESSMENT — SOCIAL DETERMINANTS OF HEALTH (SDOH): HOW HARD IS IT FOR YOU TO PAY FOR THE VERY BASICS LIKE FOOD, HOUSING, MEDICAL CARE, AND HEATING?: NOT HARD AT ALL

## 2022-08-02 NOTE — PROGRESS NOTES
obstructive pulmonary disease) ([facility] [address] and Valvular heart disease. Subjective:      Review of Systems   Constitutional:  Negative for activity change, fatigue and fever. HENT:  Positive for congestion, postnasal drip, rhinorrhea and sinus pressure. Negative for ear pain, sore throat and trouble swallowing. Eyes:  Positive for itching. Negative for pain, discharge and redness. Respiratory:  Negative for cough, shortness of breath and wheezing. Cardiovascular: Negative. Gastrointestinal:  Negative for abdominal pain, constipation, diarrhea, nausea and vomiting. Endocrine: Negative. Genitourinary:  Negative for dysuria, frequency and urgency. Musculoskeletal:  Negative for arthralgias, back pain and myalgias. Skin:  Negative for rash. Allergic/Immunologic: Negative. Neurological:  Negative for dizziness, tremors, weakness and headaches. Hematological: Negative. Psychiatric/Behavioral:  Negative for dysphoric mood and sleep disturbance. The patient is not nervous/anxious. Objective:     BP 98/60   Pulse 96   Temp 98.2 °F (36.8 °C) (Oral)   Resp 16   Wt 106 lb (48.1 kg)   SpO2 94%   BMI 18.78 kg/m²     Physical Exam  Vitals reviewed. Constitutional:       General: She is not in acute distress. Appearance: Normal appearance. She is well-developed. She is not toxic-appearing or diaphoretic. HENT:      Head: Normocephalic. No right periorbital erythema or left periorbital erythema. Jaw: No trismus. Right Ear: Hearing, tympanic membrane, ear canal and external ear normal.      Left Ear: Hearing, tympanic membrane, ear canal and external ear normal.      Nose: Congestion present. No mucosal edema or rhinorrhea. Mouth/Throat:      Mouth: Mucous membranes are moist.      Dentition: Normal dentition. Pharynx: Uvula midline. No posterior oropharyngeal erythema. Tonsils: No tonsillar exudate. 0 on the right. 0 on the left.    Eyes:      General: Lids are normal.         Right eye: No discharge. Left eye: No discharge. Conjunctiva/sclera: Conjunctivae normal.      Right eye: Right conjunctiva is not injected. No chemosis. Left eye: No chemosis. Pupils: Pupils are equal, round, and reactive to light. Neck:      Vascular: No JVD. Trachea: Trachea normal.   Cardiovascular:      Rate and Rhythm: Normal rate and regular rhythm. Heart sounds: Normal heart sounds. No murmur heard. Pulmonary:      Effort: Pulmonary effort is normal. No respiratory distress. Breath sounds: Normal breath sounds. No stridor. No wheezing. Abdominal:      General: Bowel sounds are normal. There is no distension. Palpations: Abdomen is soft. Tenderness: There is no abdominal tenderness. Musculoskeletal:         General: No tenderness or signs of injury. Normal range of motion. Cervical back: Full passive range of motion without pain and normal range of motion. Lymphadenopathy:      Cervical: No cervical adenopathy. Skin:     General: Skin is warm and dry. Capillary Refill: Capillary refill takes less than 2 seconds. Findings: No rash. Neurological:      Mental Status: She is alert and oriented to person, place, and time. GCS: GCS eye subscore is 4. GCS verbal subscore is 5. GCS motor subscore is 6. Cranial Nerves: No cranial nerve deficit. Coordination: Coordination normal.      Gait: Gait normal.   Psychiatric:         Mood and Affect: Mood is not anxious or depressed. Speech: Speech normal.         Behavior: Behavior normal. Behavior is not withdrawn or hyperactive. Behavior is cooperative. Thought Content: Thought content normal.         Judgment: Judgment normal.       Assessment/Plan:      Violeta Benavidez was seen today for allergies. Diagnoses and all orders for this visit:    Seasonal allergies  -     predniSONE (DELTASONE) 20 MG tablet;  Take 1 tablet by mouth in the morning and 1 tablet before

## 2022-08-22 ENCOUNTER — OFFICE VISIT (OUTPATIENT)
Dept: FAMILY MEDICINE CLINIC | Age: 67
End: 2022-08-22
Payer: MEDICARE

## 2022-08-22 VITALS
DIASTOLIC BLOOD PRESSURE: 58 MMHG | RESPIRATION RATE: 16 BRPM | HEART RATE: 88 BPM | TEMPERATURE: 98.2 F | WEIGHT: 105.8 LBS | BODY MASS INDEX: 18.74 KG/M2 | SYSTOLIC BLOOD PRESSURE: 116 MMHG

## 2022-08-22 DIAGNOSIS — F43.21 GRIEF REACTION: ICD-10-CM

## 2022-08-22 DIAGNOSIS — S76.211A STRAIN OF GROIN, RIGHT, INITIAL ENCOUNTER: Primary | ICD-10-CM

## 2022-08-22 PROCEDURE — 1123F ACP DISCUSS/DSCN MKR DOCD: CPT | Performed by: NURSE PRACTITIONER

## 2022-08-22 PROCEDURE — 99213 OFFICE O/P EST LOW 20 MIN: CPT | Performed by: NURSE PRACTITIONER

## 2022-08-22 ASSESSMENT — ENCOUNTER SYMPTOMS
COUGH: 0
WHEEZING: 0
CONSTIPATION: 0
RHINORRHEA: 0
VOMITING: 0
TROUBLE SWALLOWING: 0
ALLERGIC/IMMUNOLOGIC NEGATIVE: 1
EYE REDNESS: 0
BACK PAIN: 0
DIARRHEA: 0
SORE THROAT: 0
SHORTNESS OF BREATH: 0
ABDOMINAL PAIN: 0
EYE PAIN: 0
EYE DISCHARGE: 0
NAUSEA: 0

## 2022-08-22 NOTE — PROGRESS NOTES
[address] [facility]  [address] [facility] [address]  Dept: [phone]  Dept Fax: [phone]  Loc: [phone]     Visit Date:  8/22/2022      Patient:  Magdaleno Rivera  YOB: 1955    HPI:     Chief Complaint   Patient presents with    Pain     Pulled something in the groin? Was coughing with ST last week, squishy to touch, not really pain, swollen    Anxiety     Stress, grief       Patient presents with concern about possible pulled muscle in her right groin area. Last week she was coughing a lot due to upper respiratory congestion and while sitting in a car 1 day, she felt a pop in her right groin area. She states today it has no pain at all it just sticks out and she is concerned about a hernia. Patient also processing the recent sudden death of her significant other. Medications    Current Outpatient Medications:     metoprolol tartrate (LOPRESSOR) 25 MG tablet, Take 1 tablet by mouth 2 times daily, Disp: 60 tablet, Rfl: 11    aspirin 81 MG EC tablet, Take 81 mg by mouth daily, Disp: , Rfl:     calcium carbonate (OSCAL) 500 MG TABS tablet, Take 500 mg by mouth daily, Disp: , Rfl:     ferrous sulfate (IRON 325) 325 (65 Fe) MG tablet, Take 325 mg by mouth Daily with lunch , Disp: , Rfl:     Multiple Vitamins-Minerals (PRESERVISION AREDS PO), Take 2 tablets by mouth daily, Disp: , Rfl:     Multiple Vitamin TABS, Take by mouth daily, Disp: , Rfl:     Biotin 1000 MCG TABS, Take by mouth daily, Disp: , Rfl:     The patient has No Known Allergies. Past Medical History  Ashtyn Barnes  has a past medical history of Anemia, COPD (chronic obstructive pulmonary disease) ([address] and Valvular heart disease. Subjective:      Review of Systems   Constitutional:  Negative for activity change, fatigue and fever. HENT:  Negative for congestion, ear pain, rhinorrhea, sore throat and trouble swallowing.     Eyes:  Negative for pain, discharge and redness. Respiratory:  Negative for cough, shortness of breath and wheezing. Cardiovascular: Negative. Gastrointestinal:  Negative for abdominal pain, constipation, diarrhea, nausea and vomiting. Right groin edema   Endocrine: Negative. Genitourinary:  Negative for dysuria, frequency and urgency. Musculoskeletal:  Negative for arthralgias, back pain and myalgias. Skin:  Negative for rash. Allergic/Immunologic: Negative. Neurological:  Negative for dizziness, tremors, weakness and headaches. Hematological: Negative. Psychiatric/Behavioral:  Positive for sleep disturbance. Negative for dysphoric mood. The patient is not nervous/anxious. Variable grief reactions     Objective:     BP (!) 116/58 (Site: Right Upper Arm)   Pulse 88   Temp 98.2 °F (36.8 °C) (Oral)   Resp 16   Wt 105 lb 12.8 oz (48 kg)   BMI 18.74 kg/m²     Physical Exam  Vitals and nursing note reviewed. Constitutional:       General: She is not in acute distress. Appearance: She is well-developed. She is not ill-appearing or diaphoretic. HENT:      Right Ear: External ear normal.      Left Ear: External ear normal.      Nose: Nose normal.   Eyes:      General:         Right eye: No discharge. Left eye: No discharge. Conjunctiva/sclera: Conjunctivae normal.      Pupils: Pupils are equal, round, and reactive to light. Neck:      Vascular: No JVD. Cardiovascular:      Rate and Rhythm: Normal rate and regular rhythm. Pulmonary:      Effort: Pulmonary effort is normal. No respiratory distress. Musculoskeletal:         General: No tenderness or deformity. Normal range of motion. Cervical back: Normal range of motion. Skin:     General: Skin is warm and dry. Capillary Refill: Capillary refill takes less than 2 seconds. Coloration: Skin is not pale. Findings: No erythema or rash.    Neurological:      Mental Status: She is alert and oriented to person, place, and time.      Coordination: Coordination normal.   Psychiatric:         Behavior: Behavior normal.         Thought Content: Thought content normal.         Judgment: Judgment normal.       Assessment/Plan:      Jaylene Rouse was seen today for pain and anxiety. Diagnoses and all orders for this visit:    Strain of groin, right, initial encounter    Grief reaction    Patient's right groin area above the symphysis pubis area is elevated, soft and concerning for possible hernia; although the patient has no pain or discomfort with this. It is markedly different than the left side. Patient will put ice on this and monitor it for 2 weeks and if it is persistent we will send her for ultrasound. Patient talks well through her current grief and has no signs of significant ability to function through her ADLs. She has good support and has activities to keep her busy. Return if symptoms worsen or fail to improve. Patient instructions given riatviewed.         Electronically signed by NATALY Whitt CNP on 8/22/2022 at 2:57 PM

## 2022-09-19 ENCOUNTER — OFFICE VISIT (OUTPATIENT)
Dept: CARDIOLOGY CLINIC | Age: 67
End: 2022-09-19
Payer: MEDICARE

## 2022-09-19 VITALS
HEART RATE: 71 BPM | DIASTOLIC BLOOD PRESSURE: 89 MMHG | HEIGHT: 63 IN | WEIGHT: 104.2 LBS | SYSTOLIC BLOOD PRESSURE: 156 MMHG | BODY MASS INDEX: 18.46 KG/M2

## 2022-09-19 DIAGNOSIS — Z95.3 FOLLOW-UP VISIT FOR AORTIC VALVE REPLACEMENT WITH BIOPROSTHETIC VALVE: Primary | ICD-10-CM

## 2022-09-19 DIAGNOSIS — D64.9 NORMOCYTIC ANEMIA: ICD-10-CM

## 2022-09-19 DIAGNOSIS — Z09 FOLLOW-UP VISIT FOR AORTIC VALVE REPLACEMENT WITH BIOPROSTHETIC VALVE: Primary | ICD-10-CM

## 2022-09-19 DIAGNOSIS — I35.1 MODERATE AORTIC REGURGITATION: ICD-10-CM

## 2022-09-19 PROCEDURE — 1123F ACP DISCUSS/DSCN MKR DOCD: CPT | Performed by: NURSE PRACTITIONER

## 2022-09-19 PROCEDURE — 99213 OFFICE O/P EST LOW 20 MIN: CPT | Performed by: NURSE PRACTITIONER

## 2022-09-19 NOTE — PROGRESS NOTES
[id number] [address]  [place] [address]  Dept: [phone]  Dept Fax: [phone]  Loc: [phone]    Visit Date: 9/19/2022    Primary cardiologist: Omar Huang MD    Ms. Joel Shepard is a 75 y.o. female  who presented for: 6-month follow-up    Chief Complaint   Patient presents with    Follow-up     6 month follow up        HPI:   HPI   Last seen in office on 3/17/2022 per Dr. Sherin Petersen. Per office note:  79 yo F with a hx of Severe Aortic Regurgitation s/p SAVR who presents for follow-up. Patient underwent SAVR by Dr. Samanta Angela on 11/19/2021 for severe aortic regurgitation on DEREK with EF 35-40%. She reports feeling well since the procedure. She is currently on ASA 81 and Lopressor 25 BID. No chest pain, orthopnea, PND, VALENTINE, palpitations, or syncope. No longer has leg swelling. Assessment/Plan   Severe Aortic Regurgitation S/p SAVR 11/19/2021  EF 35-40%  BRENDA on Iron Supplement  GERD   Doing well after SAVR for AI. EF 35-40%, doing well. Re-check TTE post surgery. If not improved will need to add GDMT/cardiomyopathy meds. Continue BB for the time being. Discussed diet/exercise/BP/weight loss/health lifestyle choices/lipids; the patient understands the goals and will try to comply. Discussed diet/exercise/BP/lipids/lifestyle modification; the patient understands and will try to comply.      Disposition:  1 year    Current Outpatient Medications:     metoprolol tartrate (LOPRESSOR) 25 MG tablet, Take 1 tablet by mouth 2 times daily, Disp: 60 tablet, Rfl: 11    aspirin 81 MG EC tablet, Take 81 mg by mouth daily, Disp: , Rfl:     calcium carbonate (OSCAL) 500 MG TABS tablet, Take 500 mg by mouth daily, Disp: , Rfl:     ferrous sulfate (IRON 325) 325 (65 Fe) MG tablet, Take 325 mg by mouth Daily with lunch , Disp: , Rfl:     Multiple Vitamins-Minerals (PRESERVISION AREDS PO), Take 2 tablets by mouth daily, Disp: , Rfl:     Multiple Vitamin TABS, Take by mouth daily, Disp: , Rfl:     Biotin 1000 MCG TABS, Take by mouth daily, Disp: , Rfl:     Past Medical History  Ashtyn Barnes  has a past medical history of Anemia, COPD (chronic obstructive pulmonary disease) ([address] and Valvular heart disease. Social History  Ashtyn Barnes  reports that she quit smoking about 18 months ago. Her smoking use included cigarettes. She started smoking about 50 years ago. She has a 48.00 pack-year smoking history. She has never used smokeless tobacco. She reports current alcohol use. She reports that she does not use drugs. Family History  Ashtyn Barnes family history includes Cancer in her father; Kidney Disease in her mother. There is no family history of bicuspid aortic valve, aneurysms, heart transplant, pacemakers, defibrillators, or sudden cardiac death. Past Surgical History   Past Surgical History:   Procedure Laterality Date    AORTIC VALVE REPLACEMENT N/A 2021    AORTIC VALVE REPLACEMENT performed by Prakash Culp MD at [place] [address] Left     OIO     HIP SURGERY Right 2012    TOTAL HIP ARTHROPLASTY      [facility]  [address] [facility]   Today's visit:   Subjective:  Doing real well  Wearing compression socks - on feet on concrete - no swelling issues  No chest discomfort; no breathing issues  No lightheadedness or dizziness; no syncope or near syncope  Still taking Fe  Having difficulty gaining weight - eating - has appetite  No lightheadedness or dizziness; no syncope or near syncope  No palpitations  Her Pedro Felt'  in August - grief - has good support system  Tolerating meds  No bleeding on ASA      Review of Systems   Constitutional: Negative for chills and fever  HENT: Negative for congestion, sinus pressure, sneezing and sore throat. Eyes: Negative for pain, discharge, redness and itching.    Respiratory: Negative for PND, orthopnea, cough  Gastrointestinal: Negative for blood in stool, constipation, diarrhea   Endocrine: Negative for cold intolerance, heat intolerance, polydipsia. Genitourinary: Negative for dysuria, hematuria. Musculoskeletal: Negative for arthralgias, joint swelling and neck pain. Neurological: Negative for numbness and headaches. Psychiatric/Behavioral: Negative for agitation, confusion, decreased concentration and dysphoric mood. Objective:     BP (!) 156/89   Pulse 71   Ht 5' 3\" (1.6 m)   Wt 104 lb 3.2 oz (47.3 kg)   BMI 18.46 kg/m²     Wt Readings from Last 3 Encounters:   09/19/22 104 lb 3.2 oz (47.3 kg)   08/22/22 105 lb 12.8 oz (48 kg)   08/02/22 106 lb (48.1 kg)     BP Readings from Last 3 Encounters:   09/19/22 (!) 156/89   08/22/22 (!) 116/58   08/02/22 98/60       Nursing note and vitals reviewed. Physical Exam   Constitutional: Oriented to person, place, and time. Appears well-developed and well-nourished. Thin, small frame; appears well. No distress. HENT:   Head: Normocephalic and atraumatic. Eyes: EOM are normal. Pupils are equal, round, and reactive to light. Neck: Normal range of motion. Neck supple. No JVD present. No thyromegaly. Cardiovascular: Normal rate, regular rhythm, normal heart sounds and intact distal pulses. No murmur heard. Pulmonary/Chest: Effort normal and breath sounds normal. No respiratory distress. No wheezes. No rales. Abdominal: Soft. Bowel sounds are normal. No distension. There is no tenderness. Musculoskeletal: Normal range of motion. No edema. Neurological: Alert and oriented to person, place, and time. No cranial nerve deficit. Coordination normal.   Skin: Skin is warm and dry. Psychiatric: Normal mood and affect.        No results found for: CKTOTAL, CKMB, CKMBINDEX    Lab Results   Component Value Date/Time    WBC 3.9 01/11/2022 10:39 AM    RBC 3.28 01/11/2022 10:39 AM    HGB 10.4 01/11/2022 10:39 AM    HCT 33.9 01/11/2022 10:39 AM    .4 01/11/2022 10:39 AM    MCH 31.7 01/11/2022 10:39 AM    MCHC 30.7 01/11/2022 10:39 AM    RDW 12.5 06/13/2012 05:30 AM     01/11/2022 10:39 AM    MPV 10.6 01/11/2022 10:39 AM       Lab Results   Component Value Date/Time     12/07/2021 04:11 PM    K 4.6 12/07/2021 04:11 PM    CL 98 12/07/2021 04:11 PM    CO2 26 12/07/2021 04:11 PM    BUN 33 12/07/2021 04:11 PM    LABALBU 4.1 06/23/2020 08:47 AM    CREATININE 1.1 12/07/2021 04:11 PM    CALCIUM 9.2 12/07/2021 04:11 PM    LABGLOM 49 12/07/2021 04:11 PM    GLUCOSE 104 12/07/2021 04:11 PM       Lab Results   Component Value Date/Time    ALKPHOS 71 06/23/2020 08:47 AM    ALT 18 06/23/2020 08:47 AM    AST 22 06/23/2020 08:47 AM    PROT 6.3 06/23/2020 08:47 AM    BILITOT 0.4 06/23/2020 08:47 AM    BILIDIR <0.2 06/23/2020 08:47 AM    LABALBU 4.1 06/23/2020 08:47 AM       Lab Results   Component Value Date/Time    MG 1.9 11/23/2021 05:31 AM       Lab Results   Component Value Date    INR 1.07 11/15/2021    INR 1.07 10/20/2021    INR 0.97 06/11/2012         Lab Results   Component Value Date/Time    LABA1C 4.6 11/15/2021 08:15 AM       Lab Results   Component Value Date/Time    TRIG 30 10/20/2021 09:37 AM    HDL 61 10/20/2021 09:37 AM    LDLCALC 40 10/20/2021 09:37 AM       No results found for: TSH      Testing Reviewed:      I have individually reviewed the cardiac test below:  EKG: 3/18/2022  Sinus rhythm 68/min; isolated PVC    Echo: 4/19/2022  Summary   Ejection fraction was estimated at 50-55%. Right atrial size was moderately dilated. Left atrial size was normal.   S/p SAVR. DOPPLER: Transaortic velocity was within the normal range with   no evidence of aortic stenosis. There was no evidence of aortic   regurgitation. Signature    ----------------------------------------------------------------   Electronically signed by Robert Oconnell MD (Interpreting   physician) on 04/19/2022 at 03:16 PM      ECHO: 09/09/21    Indications:Lower extremity edema and Heart murmur. Additional Medical History: Former smoker, heart murmur  Summary  Ejection fraction is visually estimated at 50%. Overall left ventricular function is normal.  Aortic valve appears tricuspid. Thickened aortic valve leaflets noted. Aortic valve leaflets are Moderately calcified. Moderate-to-severe aortic regurgitation is noted. Mild aortic stenosis is present. Signature  ----------------------------------------------------------------  Electronically signed by Jonathon Andrew MD (Interpreting  physician) on 09/09/2021 at 04:33 PM  ----------------------------------------------------------------    Findings    Mitral Valve  Mild mitral regurgitation is present. Aortic Valve  Aortic valve appears tricuspid. Thickened aortic valve leaflets noted. Aortic valve leaflets are Moderately calcified. Moderate-to-severe aortic regurgitation is noted. Mild aortic stenosis is present. Cath: 10/20/2021; nonobstructive CAD     AVR 11/19/21  Shaun Conway MD   Physician   Cardiothoracic Surgery   Op Note       Signed   Date of Service:  11/19/2021 12:37 PM                 Signed        DATE: 11/19/21     PATIENT: Ariane Salazar     MRN: [id number]                                  Acct: [de-identified]     PREOP DIAGNOSIS:   Aortic stenosis     Postop diagnosis:  Aortic stenosis     Procedure: Aortic valve replacement with a 25 mm [employer] bioprosthesis     SURGEON:  Bernardo Farmer MD     INDICATION:  The patient is a 78y.o. year-old female who presents with severe symptomatic aortic insufficiency. FINDNGS: A severe pericarditis was found, with extensive intrapericardial adhesions, rendering access to the SVC impossible for purposes of venous cannulation without an extensive adhesiolysis. Therefore, the initial mini-sternotomy approach was converted to a full sternotomy. The valve was tricuspid. The mean post-cardiopulmonary bypass prosthetic valve gradient was 8 mmHg. .                    Assessment/Plan   Severe Aortic Regurgitation S/p SAVR 11/19/2021  EF 35-40%; EF recovered with Echo 4/22 to 50 - 55%  BRENDA on Iron Supplement  GERD  Recent loss of Fiance'  Doing well after SAVR for AI. EF recovered, doing well. Continue BB. BP little higher - will monitor. Unable to gain weight - has been tiny all her life - no recent unintended weight loss; grief at this time but eating. No palpitations or overt sx for hyperthyroid - could check levels if warranted. Active, no syncope or near syncope, breathing stable. No volume issues. Discussed diet/exercise/BP/weight loss/health lifestyle choices/lipids; the patient understands the goals and will try to comply.   Disposition:  1 year    Electronically signed by NATALY Wu CNP   9/19/2022 at 1:12 PM EDT

## 2022-09-19 NOTE — PATIENT INSTRUCTIONS
Check your blood pressure a couple times a week - if consistently running above 150 as top number or  above 80 as bottom number then call the office - we may need to adjust your medications. May want to consider checking thyroid function in future if seems warranted. Continue current medications as prescribed. Follow-up with your PCP as scheduled. Follow-up with Dr. Siobhan Alejandra in 1 year  as scheduled or sooner if need.

## 2022-09-19 NOTE — PROGRESS NOTES
Pt is here for: 6 month follow up     Patient denies having any chest pain, shortness of breath, dizziness, lightheadedness or palpitations and DEENA.      Last EKG was done on: 03/17/2022

## 2022-10-06 ENCOUNTER — OFFICE VISIT (OUTPATIENT)
Dept: FAMILY MEDICINE CLINIC | Age: 67
End: 2022-10-06
Payer: MEDICARE

## 2022-10-06 ENCOUNTER — TELEPHONE (OUTPATIENT)
Dept: FAMILY MEDICINE CLINIC | Age: 67
End: 2022-10-06

## 2022-10-06 VITALS
DIASTOLIC BLOOD PRESSURE: 66 MMHG | HEIGHT: 64 IN | BODY MASS INDEX: 18.27 KG/M2 | TEMPERATURE: 97.6 F | WEIGHT: 107 LBS | SYSTOLIC BLOOD PRESSURE: 128 MMHG | OXYGEN SATURATION: 96 % | RESPIRATION RATE: 20 BRPM | HEART RATE: 65 BPM

## 2022-10-06 DIAGNOSIS — R18.8 INGUINAL FLUID COLLECTION: Primary | ICD-10-CM

## 2022-10-06 DIAGNOSIS — Z00.00 MEDICARE ANNUAL WELLNESS VISIT, SUBSEQUENT: Primary | ICD-10-CM

## 2022-10-06 PROBLEM — R19.09 RIGHT GROIN MASS: Status: ACTIVE | Noted: 2022-10-06

## 2022-10-06 PROCEDURE — 1123F ACP DISCUSS/DSCN MKR DOCD: CPT | Performed by: NURSE PRACTITIONER

## 2022-10-06 PROCEDURE — G0439 PPPS, SUBSEQ VISIT: HCPCS | Performed by: NURSE PRACTITIONER

## 2022-10-06 ASSESSMENT — PATIENT HEALTH QUESTIONNAIRE - PHQ9
2. FEELING DOWN, DEPRESSED OR HOPELESS: 1
SUM OF ALL RESPONSES TO PHQ QUESTIONS 1-9: 1
SUM OF ALL RESPONSES TO PHQ9 QUESTIONS 1 & 2: 1
SUM OF ALL RESPONSES TO PHQ QUESTIONS 1-9: 1
SUM OF ALL RESPONSES TO PHQ QUESTIONS 1-9: 1
1. LITTLE INTEREST OR PLEASURE IN DOING THINGS: 0
SUM OF ALL RESPONSES TO PHQ QUESTIONS 1-9: 1

## 2022-10-06 ASSESSMENT — LIFESTYLE VARIABLES
HOW OFTEN DO YOU HAVE A DRINK CONTAINING ALCOHOL: 2-3 TIMES A WEEK
HOW MANY STANDARD DRINKS CONTAINING ALCOHOL DO YOU HAVE ON A TYPICAL DAY: 1 OR 2

## 2022-10-06 NOTE — PROGRESS NOTES
Medicare Annual Wellness Visit    Rafael Bernal is here for Medicare AWV    Assessment & Plan   Medicare annual wellness visit, subsequent  -     Basic Metabolic Panel; Future  -     Lipid, Fasting; Future  -     Hepatic Function Panel; Future  -     CBC with Auto Differential; Future  -     Iron; Future    Recommendations for Preventive Services Due: see orders and patient instructions/AVS.  Recommended screening schedule for the next 5-10 years is provided to the patient in written form: see Patient Instructions/AVS.     Return in 1 year (on 10/6/2023) for Medicare Annual Wellness Visit in 1 year. Subjective   The following acute and/or chronic problems were also addressed today:  None    Patient's complete Health Risk Assessment and screening values have been reviewed and are found in Flowsheets. The following problems were reviewed today and where indicated follow up appointments were made and/or referrals ordered. Positive Risk Factor Screenings with Interventions:             General Health and ACP:  General  In general, how would you say your health is?: Good  In the past 7 days, have you experienced any of the following: New or Increased Pain, New or Increased Fatigue, Loneliness, Social Isolation, Stress or Anger?: (!) Yes  Select all that apply: (!) Stress  Do you get the social and emotional support that you need?: Yes  Do you have a Living Will?: (!) No    Advance Directives       Power of  Living Will ACP-Advance Directive ACP-Power of     Not on File Not on File Not on File Not on File        General Health Risk Interventions:  Patient continuing to not smoke, is gained a little bit of weight, but overall is functioning as well as she can given her circumstances.     Health Habits/Nutrition:  Physical Activity: Inactive    Days of Exercise per Week: 0 days    Minutes of Exercise per Session: 0 min     Have you lost any weight without trying in the past 3 months?: No  Body mass index: (!) 18.36  Have you seen the dentist within the past year?: Yes  Health Habits/Nutrition Interventions:  Still struggling with grief post spousal death suddenly a few months ago. Managing fairly well she is on her own             Objective   Vitals:    10/06/22 0952   BP: 128/66   Pulse: 65   Resp: 20   Temp: 97.6 °F (36.4 °C)   TempSrc: Oral   SpO2: 96%   Weight: 107 lb (48.5 kg)   Height: 5' 4\" (1.626 m)      Body mass index is 18.37 kg/m². General Appearance: alert and oriented to person, place and time, well developed and well- nourished, in no acute distress  Skin: warm and dry, no rash or erythema  Head: normocephalic and atraumatic  Eyes: pupils equal, round, and reactive to light, extraocular eye movements intact, conjunctivae normal  ENT: tympanic membrane, external ear and ear canal normal bilaterally, nose without deformity, nasal mucosa and turbinates normal without polyps  Neck: supple and non-tender without mass, no thyromegaly or thyroid nodules, no cervical lymphadenopathy  Pulmonary/Chest: clear to auscultation bilaterally- no wheezes, rales or rhonchi, normal air movement, no respiratory distress  Cardiovascular: normal rate, regular rhythm, normal S1 and S2, no murmurs, rubs, clicks, or gallops, distal pulses intact, no carotid bruits  Abdomen: soft, non-tender, non-distended, normal bowel sounds, no masses or organomegaly  Extremities: no cyanosis, clubbing or edema  Musculoskeletal: normal range of motion, no joint swelling, deformity or tenderness  Neurologic: reflexes normal and symmetric, no cranial nerve deficit, gait, coordination and speech normal       No Known Allergies  Prior to Visit Medications    Medication Sig Taking?  Authorizing Provider   metoprolol tartrate (LOPRESSOR) 25 MG tablet Take 1 tablet by mouth 2 times daily Yes Phil Shelley MD   aspirin 81 MG EC tablet Take 81 mg by mouth daily Yes Historical Provider, MD   calcium carbonate (OSCAL) 500 MG TABS tablet

## 2022-10-06 NOTE — TELEPHONE ENCOUNTER
I placed an order for ultrasound of her right groin area where she has a mild bulge. CT not necessary at this time.

## 2022-10-06 NOTE — PATIENT INSTRUCTIONS
Personalized Preventive Plan for Ji Alicea - 10/6/2022  Medicare offers a range of preventive health benefits. Some of the tests and screenings are paid in full while other may be subject to a deductible, co-insurance, and/or copay. Some of these benefits include a comprehensive review of your medical history including lifestyle, illnesses that may run in your family, and various assessments and screenings as appropriate. After reviewing your medical record and screening and assessments performed today your provider may have ordered immunizations, labs, imaging, and/or referrals for you. A list of these orders (if applicable) as well as your Preventive Care list are included within your After Visit Summary for your review. Other Preventive Recommendations:    A preventive eye exam performed by an eye specialist is recommended every 1-2 years to screen for glaucoma; cataracts, macular degeneration, and other eye disorders. A preventive dental visit is recommended every 6 months. Try to get at least 150 minutes of exercise per week or 10,000 steps per day on a pedometer . Order or download the FREE \"Exercise & Physical Activity: Your Everyday Guide\" from [employer]. Call 4-674.669.9224 or search [employer] online. You need 6057-8094 mg of calcium and 2304-2871 IU of vitamin D per day. It is possible to meet your calcium requirement with diet alone, but a vitamin D supplement is usually necessary to meet this goal.  When exposed to the sun, use a sunscreen that protects against both UVA and UVB radiation with an SPF of 30 or greater. Reapply every 2 to 3 hours or after sweating, drying off with a towel, or swimming. Always wear a seat belt when traveling in a car. Always wear a helmet when riding a bicycle or motorcycle.

## 2022-10-06 NOTE — TELEPHONE ENCOUNTER
----- Message from Enedina Griffin sent at 10/6/2022  2:59 PM EDT -----  Subject: Referral Request    Reason for referral request? CT SCAN  Provider patient wants to be referred to(if known):     Provider Phone Number(if known):     Additional Information for Provider?   ---------------------------------------------------------------------------  --------------  [id number]    [phone]; OK to leave message on voicemail  ---------------------------------------------------------------------------  --------------

## 2022-10-11 ENCOUNTER — TELEPHONE (OUTPATIENT)
Dept: FAMILY MEDICINE CLINIC | Age: 67
End: 2022-10-11

## 2022-10-11 ENCOUNTER — HOSPITAL ENCOUNTER (OUTPATIENT)
Dept: ULTRASOUND IMAGING | Age: 67
Discharge: HOME | End: 2022-10-11
Payer: MEDICARE

## 2022-10-11 DIAGNOSIS — R18.8 INGUINAL FLUID COLLECTION: ICD-10-CM

## 2022-10-11 DIAGNOSIS — R18.8 INGUINAL FLUID COLLECTION: Primary | ICD-10-CM

## 2022-10-11 PROCEDURE — 76882 US LMTD JT/FCL EVL NVASC XTR: CPT

## 2022-10-11 NOTE — TELEPHONE ENCOUNTER
IR Judit called back and the radiologist reviewed her results.  He recommends that you order a \"Contrast Enhanced CT of Pelvis\"

## 2022-10-11 NOTE — TELEPHONE ENCOUNTER
Sofia Isidro called back and is in agreement with further testing. We will wait to hear from IR scheduling at [facility] to see what they recommend. Sofia Isidro said it is ok to leave a voice mail.

## 2022-10-11 NOTE — TELEPHONE ENCOUNTER
Left VM to return call regarding results. I also called IR scheduling at [facility] to see if this is something they do and what exactly to order.

## 2022-10-11 NOTE — TELEPHONE ENCOUNTER
----- Message from NATALY Noonan CNP sent at 10/11/2022  8:12 AM EDT -----  Please notify patient of finding of irregularity of uncertain etiology in the groin, and place a referral for interventional radiology to biopsy.

## 2022-10-18 ENCOUNTER — TELEPHONE (OUTPATIENT)
Dept: FAMILY MEDICINE CLINIC | Age: 67
End: 2022-10-18

## 2022-10-18 NOTE — TELEPHONE ENCOUNTER
She still has small lump in the groin area, but it is better. It does not hurt. She cannot afford to get the CT scan right now. She will keep an eye on it.

## 2022-10-18 NOTE — TELEPHONE ENCOUNTER
----- Message from El Weiner sent at 10/18/2022 10:34 AM EDT -----  Subject: Message to Provider    QUESTIONS  Information for Provider? Pt states that she was suppose to get an CAT   scan and at this time she is unable to. Pt states that she does not have   the money. Please call pt back for more specifics  ---------------------------------------------------------------------------  --------------  Tameka CHU  [phone]; OK to leave message on voicemail  ---------------------------------------------------------------------------  --------------  SCRIPT ANSWERS  Relationship to Patient?  Self

## 2023-03-09 ENCOUNTER — OFFICE VISIT (OUTPATIENT)
Dept: FAMILY MEDICINE CLINIC | Age: 68
End: 2023-03-09
Payer: MEDICARE

## 2023-03-09 VITALS
BODY MASS INDEX: 18.71 KG/M2 | OXYGEN SATURATION: 95 % | HEART RATE: 90 BPM | DIASTOLIC BLOOD PRESSURE: 74 MMHG | SYSTOLIC BLOOD PRESSURE: 120 MMHG | TEMPERATURE: 97.7 F | RESPIRATION RATE: 20 BRPM | WEIGHT: 109 LBS

## 2023-03-09 DIAGNOSIS — J43.9 PULMONARY EMPHYSEMA, UNSPECIFIED EMPHYSEMA TYPE (HCC): ICD-10-CM

## 2023-03-09 DIAGNOSIS — J01.00 ACUTE NON-RECURRENT MAXILLARY SINUSITIS: Primary | ICD-10-CM

## 2023-03-09 DIAGNOSIS — Z87.891 HISTORY OF SMOKING 30 OR MORE PACK YEARS: ICD-10-CM

## 2023-03-09 PROCEDURE — 1090F PRES/ABSN URINE INCON ASSESS: CPT | Performed by: NURSE PRACTITIONER

## 2023-03-09 PROCEDURE — 3017F COLORECTAL CA SCREEN DOC REV: CPT | Performed by: NURSE PRACTITIONER

## 2023-03-09 PROCEDURE — 1036F TOBACCO NON-USER: CPT | Performed by: NURSE PRACTITIONER

## 2023-03-09 PROCEDURE — G8420 CALC BMI NORM PARAMETERS: HCPCS | Performed by: NURSE PRACTITIONER

## 2023-03-09 PROCEDURE — G8399 PT W/DXA RESULTS DOCUMENT: HCPCS | Performed by: NURSE PRACTITIONER

## 2023-03-09 PROCEDURE — G8484 FLU IMMUNIZE NO ADMIN: HCPCS | Performed by: NURSE PRACTITIONER

## 2023-03-09 PROCEDURE — 3023F SPIROM DOC REV: CPT | Performed by: NURSE PRACTITIONER

## 2023-03-09 PROCEDURE — 99214 OFFICE O/P EST MOD 30 MIN: CPT | Performed by: NURSE PRACTITIONER

## 2023-03-09 PROCEDURE — 1123F ACP DISCUSS/DSCN MKR DOCD: CPT | Performed by: NURSE PRACTITIONER

## 2023-03-09 PROCEDURE — G8427 DOCREV CUR MEDS BY ELIG CLIN: HCPCS | Performed by: NURSE PRACTITIONER

## 2023-03-09 RX ORDER — PREDNISONE 20 MG/1
20 TABLET ORAL 2 TIMES DAILY
Qty: 10 TABLET | Refills: 0 | Status: SHIPPED | OUTPATIENT
Start: 2023-03-09 | End: 2023-03-14

## 2023-03-09 RX ORDER — CEFDINIR 300 MG/1
300 CAPSULE ORAL 2 TIMES DAILY
Qty: 20 CAPSULE | Refills: 0 | Status: SHIPPED | OUTPATIENT
Start: 2023-03-09 | End: 2023-03-19

## 2023-03-09 SDOH — ECONOMIC STABILITY: FOOD INSECURITY: WITHIN THE PAST 12 MONTHS, THE FOOD YOU BOUGHT JUST DIDN'T LAST AND YOU DIDN'T HAVE MONEY TO GET MORE.: NEVER TRUE

## 2023-03-09 SDOH — ECONOMIC STABILITY: INCOME INSECURITY: HOW HARD IS IT FOR YOU TO PAY FOR THE VERY BASICS LIKE FOOD, HOUSING, MEDICAL CARE, AND HEATING?: NOT HARD AT ALL

## 2023-03-09 SDOH — ECONOMIC STABILITY: FOOD INSECURITY: WITHIN THE PAST 12 MONTHS, YOU WORRIED THAT YOUR FOOD WOULD RUN OUT BEFORE YOU GOT MONEY TO BUY MORE.: NEVER TRUE

## 2023-03-09 SDOH — ECONOMIC STABILITY: HOUSING INSECURITY
IN THE LAST 12 MONTHS, WAS THERE A TIME WHEN YOU DID NOT HAVE A STEADY PLACE TO SLEEP OR SLEPT IN A SHELTER (INCLUDING NOW)?: NO

## 2023-03-09 ASSESSMENT — ENCOUNTER SYMPTOMS
DIARRHEA: 0
ALLERGIC/IMMUNOLOGIC NEGATIVE: 1
SINUS PRESSURE: 1
RHINORRHEA: 0
NAUSEA: 0
ABDOMINAL PAIN: 0
SORE THROAT: 1
WHEEZING: 0
BACK PAIN: 0
COUGH: 1
VOMITING: 0
TROUBLE SWALLOWING: 0
CONSTIPATION: 0
EYE DISCHARGE: 0
SHORTNESS OF BREATH: 1
EYE PAIN: 0
EYE REDNESS: 0

## 2023-03-09 ASSESSMENT — PATIENT HEALTH QUESTIONNAIRE - PHQ9
SUM OF ALL RESPONSES TO PHQ QUESTIONS 1-9: 0
1. LITTLE INTEREST OR PLEASURE IN DOING THINGS: 0
SUM OF ALL RESPONSES TO PHQ9 QUESTIONS 1 & 2: 0
2. FEELING DOWN, DEPRESSED OR HOPELESS: 0
SUM OF ALL RESPONSES TO PHQ QUESTIONS 1-9: 0

## 2023-03-09 NOTE — PROGRESS NOTES
[address] [facility] [address]  Dept: [phone]  Dept Fax: [phone]  Loc: [phone]     Visit Date:  3/9/2023      Patient:  Mi Castillo  YOB: 1955    HPI:     Chief Complaint   Patient presents with    Nasal Congestion     Since 2/14/23 went to  dx sinus congestion was on Augmentin made her more sick  COVID & Flu negative at that time    Cough    Fatigue    Shortness of Breath       Presents with about 3 weeks of illness that is ongoing including cough, fatigue, shortness of breath, severe nasal congestion and postnasal drip with frontal headache and sore throat. Patient was seen in urgent care about 9 days ago and placed on amoxicillin. She states she does not tolerate this and it makes her feel quite weak and ill. Negative flu and COVID testing at that time. Patient left work this afternoon due to her symptoms. Cough  Associated symptoms include chills, postnasal drip, a sore throat and shortness of breath. Pertinent negatives include no ear pain, eye redness, fever, headaches, myalgias, rash, rhinorrhea or wheezing. Fatigue  Associated symptoms include chills, congestion, coughing, fatigue and a sore throat. Pertinent negatives include no abdominal pain, arthralgias, fever, headaches, myalgias, nausea, rash, vomiting or weakness. Shortness of Breath  Associated symptoms include a sore throat. Pertinent negatives include no abdominal pain, ear pain, fever, headaches, rash, rhinorrhea, vomiting or wheezing.      Medications    Current Outpatient Medications:     cefdinir (OMNICEF) 300 MG capsule, Take 1 capsule by mouth 2 times daily for 10 days, Disp: 20 capsule, Rfl: 0    predniSONE (DELTASONE) 20 MG tablet, Take 1 tablet by mouth 2 times daily for 5 days, Disp: 10 tablet, Rfl: 0    metoprolol tartrate (LOPRESSOR) 25 MG tablet, Take 1 tablet by mouth 2 times daily, Disp: 60 tablet, Rfl: 11    aspirin 81 MG EC tablet, Take 81 mg by mouth daily, Disp: , Rfl:     calcium carbonate (OSCAL) 500 MG TABS tablet, Take 500 mg by mouth daily, Disp: , Rfl:     ferrous sulfate (IRON 325) 325 (65 Fe) MG tablet, Take 325 mg by mouth Daily with lunch , Disp: , Rfl:     Multiple Vitamins-Minerals (PRESERVISION AREDS PO), Take 2 tablets by mouth daily, Disp: , Rfl:     Multiple Vitamin TABS, Take by mouth daily, Disp: , Rfl:     Biotin 1000 MCG TABS, Take by mouth daily, Disp: , Rfl:     The patient is allergic to augmentin [amoxicillin-pot clavulanate].    Past Medical History  Gisela  has a past medical history of Anemia, COPD (chronic obstructive pulmonary disease) ([facility]), and Valvular heart disease.    Subjective:      Review of Systems   Constitutional:  Positive for activity change, appetite change, chills and fatigue. Negative for fever.   HENT:  Positive for congestion, postnasal drip, sinus pressure and sore throat. Negative for ear pain, rhinorrhea and trouble swallowing.    Eyes:  Negative for pain, discharge and redness.   Respiratory:  Positive for cough and shortness of breath. Negative for wheezing.    Cardiovascular: Negative.    Gastrointestinal:  Negative for abdominal pain, constipation, diarrhea, nausea and vomiting.   Endocrine: Negative.    Genitourinary:  Negative for dysuria, frequency and urgency.   Musculoskeletal:  Negative for arthralgias, back pain and myalgias.   Skin:  Negative for rash.   Allergic/Immunologic: Negative.    Neurological:  Negative for dizziness, tremors, weakness and headaches.   Hematological: Negative.    Psychiatric/Behavioral:  Negative for dysphoric mood and sleep disturbance. The patient is not nervous/anxious.      Objective:     /74   Pulse 90   Temp 97.7 °F (36.5 °C)   Resp 20   Wt 109 lb (49.4 kg)   SpO2 95%   BMI 18.71 kg/m²     Physical Exam  Vitals and nursing note reviewed.   Constitutional:       General: She is not in acute distress.      Appearance: Normal appearance. She is well-developed. She is ill-appearing. She is not diaphoretic. HENT:      Right Ear: Hearing, ear canal and external ear normal. Tympanic membrane is bulging. Left Ear: Hearing, ear canal and external ear normal. Tympanic membrane is bulging. Nose: Mucosal edema and rhinorrhea present. Right Sinus: Maxillary sinus tenderness present. Left Sinus: Maxillary sinus tenderness present. Mouth/Throat:      Dentition: Normal dentition. Pharynx: Uvula midline. Posterior oropharyngeal erythema present. Tonsils: No tonsillar exudate. 0 on the right. 0 on the left. Eyes:      General: Lids are normal.         Right eye: No discharge. Left eye: No discharge. Conjunctiva/sclera: Conjunctivae normal.      Right eye: Right conjunctiva is not injected. No chemosis. Left eye: No chemosis. Pupils: Pupils are equal, round, and reactive to light. Neck:      Vascular: No JVD. Trachea: Trachea normal.   Cardiovascular:      Rate and Rhythm: Regular rhythm. Heart sounds: Normal heart sounds. No murmur heard. Pulmonary:      Effort: Pulmonary effort is normal. No respiratory distress. Breath sounds: Normal breath sounds. No stridor. No wheezing. Abdominal:      General: Bowel sounds are normal.      Palpations: Abdomen is soft. Tenderness: There is no abdominal tenderness. Musculoskeletal:      Cervical back: Full passive range of motion without pain and normal range of motion. Lymphadenopathy:      Cervical: No cervical adenopathy. Skin:     General: Skin is warm. Capillary Refill: Capillary refill takes less than 2 seconds. Findings: No rash. Neurological:      Mental Status: She is alert and oriented to person, place, and time. GCS: GCS eye subscore is 4. GCS verbal subscore is 5. GCS motor subscore is 6. Cranial Nerves: No cranial nerve deficit.       Coordination: Coordination normal. Gait: Gait normal.   Psychiatric:         Mood and Affect: Mood is not anxious or depressed. Speech: Speech normal.         Behavior: Behavior normal. Behavior is not withdrawn or hyperactive. Behavior is cooperative. Thought Content: Thought content normal.         Judgment: Judgment normal.       Assessment/Plan:      Enriqueta Helton was seen today for nasal congestion, cough, fatigue and shortness of breath. Diagnoses and all orders for this visit:    Acute non-recurrent maxillary sinusitis  -     cefdinir (OMNICEF) 300 MG capsule; Take 1 capsule by mouth 2 times daily for 10 days  -     predniSONE (DELTASONE) 20 MG tablet; Take 1 tablet by mouth 2 times daily for 5 days    Pulmonary emphysema, unspecified emphysema type ([facility] [address]    History of smoking 30 or more pack years      Return if symptoms worsen or fail to improve. Patient instructions given mackenzie.         Electronically signed by NATALY Bartholomew CNP on 3/9/2023 at 2:39 PM

## 2023-03-26 DIAGNOSIS — Z95.3 FOLLOW-UP VISIT FOR AORTIC VALVE REPLACEMENT WITH BIOPROSTHETIC VALVE: ICD-10-CM

## 2023-03-26 DIAGNOSIS — Z09 FOLLOW-UP VISIT FOR AORTIC VALVE REPLACEMENT WITH BIOPROSTHETIC VALVE: ICD-10-CM

## 2023-06-22 ENCOUNTER — TELEPHONE (OUTPATIENT)
Dept: FAMILY MEDICINE CLINIC | Age: 68
End: 2023-06-22

## 2023-06-22 DIAGNOSIS — Z12.11 COLON CANCER SCREENING: Primary | ICD-10-CM

## 2023-06-22 NOTE — TELEPHONE ENCOUNTER
----- Message from Franceen Ormond sent at 6/22/2023  8:15 AM EDT -----  Subject: Referral Request    Reason for referral request? Priyank  Provider patient wants to be referred to(if known):     Provider Phone Number(if known): Additional Information for Provider?  Pt states she received a letter from   [facility] stating she needs to have an order to get one done   ---------------------------------------------------------------------------  --------------  Saranya CHU    [phone]; OK to leave message on voicemail  ---------------------------------------------------------------------------  --------------

## 2023-07-24 LAB — NONINV COLON CA DNA+OCC BLD SCRN STL QL: POSITIVE

## 2023-07-25 ENCOUNTER — TELEPHONE (OUTPATIENT)
Dept: FAMILY MEDICINE CLINIC | Age: 68
End: 2023-07-25

## 2023-07-25 DIAGNOSIS — R19.5 POSITIVE COLORECTAL CANCER SCREENING USING COLOGUARD TEST: Primary | ICD-10-CM

## 2023-07-25 NOTE — TELEPHONE ENCOUNTER
Pt was informed the Cologuard was positive and Kush Pittman CNP is recommending a referral to GI for diagnostic colonoscopy. Referral was placed and faxed with cologuard results to [facility]. They will call the pt to schedule. Confirmation received.

## 2023-07-25 NOTE — TELEPHONE ENCOUNTER
----- Message from NATALY Han CNP sent at 7/25/2023  7:36 AM EDT -----  Cologuard is positive. Please notify the patient and get her a referral to GI for diagnostic colonoscopy for follow-up. Please reassure the patient this could be a false positive.

## 2023-07-26 ENCOUNTER — TELEPHONE (OUTPATIENT)
Dept: FAMILY MEDICINE CLINIC | Age: 68
End: 2023-07-26

## 2023-07-26 NOTE — TELEPHONE ENCOUNTER
LM informing patient that it was ordered as diagnostic and cannot be changed because she had a cologaurd test that was positive. However there has been a newer development where the insurance should cover more of this test since she did do the cologuard preventative screening.

## 2023-07-26 NOTE — TELEPHONE ENCOUNTER
----- Message from Charlee Stallings sent at 7/26/2023  8:07 AM EDT -----  Subject: Message to Provider    QUESTIONS  Information for Provider? pt says when making the order for her to get a   colonoscopy, make sure that it is under preventative care due to insurance     ---------------------------------------------------------------------------  --------------  [address]  [phone]; OK to leave message on voicemail  ---------------------------------------------------------------------------  --------------  SCRIPT ANSWERS  Relationship to Patient?  Self

## 2023-08-07 ENCOUNTER — HOSPITAL ENCOUNTER (OUTPATIENT)
Age: 68
Setting detail: OUTPATIENT SURGERY
Discharge: HOME | End: 2023-08-07
Attending: INTERNAL MEDICINE | Admitting: INTERNAL MEDICINE
Payer: MEDICARE

## 2023-08-07 ENCOUNTER — ANESTHESIA EVENT (OUTPATIENT)
Dept: ENDOSCOPY | Age: 68
End: 2023-08-07
Payer: MEDICARE

## 2023-08-07 ENCOUNTER — ANESTHESIA (OUTPATIENT)
Dept: ENDOSCOPY | Age: 68
End: 2023-08-07
Payer: MEDICARE

## 2023-08-07 VITALS
WEIGHT: 103.6 LBS | RESPIRATION RATE: 16 BRPM | SYSTOLIC BLOOD PRESSURE: 106 MMHG | DIASTOLIC BLOOD PRESSURE: 51 MMHG | BODY MASS INDEX: 18.36 KG/M2 | OXYGEN SATURATION: 98 % | HEART RATE: 68 BPM | TEMPERATURE: 97.3 F | HEIGHT: 63 IN

## 2023-08-07 PROCEDURE — 2580000003 HC RX 258: Performed by: INTERNAL MEDICINE

## 2023-08-07 PROCEDURE — 3700000001 HC ADD 15 MINUTES (ANESTHESIA): Performed by: INTERNAL MEDICINE

## 2023-08-07 PROCEDURE — 3609027000 HC COLONOSCOPY: Performed by: INTERNAL MEDICINE

## 2023-08-07 PROCEDURE — 7100000011 HC PHASE II RECOVERY - ADDTL 15 MIN: Performed by: INTERNAL MEDICINE

## 2023-08-07 PROCEDURE — 7100000010 HC PHASE II RECOVERY - FIRST 15 MIN: Performed by: INTERNAL MEDICINE

## 2023-08-07 PROCEDURE — 6360000002 HC RX W HCPCS: Performed by: NURSE ANESTHETIST, CERTIFIED REGISTERED

## 2023-08-07 PROCEDURE — 3700000000 HC ANESTHESIA ATTENDED CARE: Performed by: INTERNAL MEDICINE

## 2023-08-07 PROCEDURE — 2709999900 HC NON-CHARGEABLE SUPPLY: Performed by: INTERNAL MEDICINE

## 2023-08-07 RX ORDER — SODIUM CHLORIDE 9 MG/ML
25 INJECTION, SOLUTION INTRAVENOUS PRN
Status: CANCELLED | OUTPATIENT
Start: 2023-08-07

## 2023-08-07 RX ORDER — SODIUM CHLORIDE 0.9 % (FLUSH) 0.9 %
5-40 SYRINGE (ML) INJECTION PRN
Status: CANCELLED | OUTPATIENT
Start: 2023-08-07

## 2023-08-07 RX ORDER — SODIUM CHLORIDE 9 MG/ML
INJECTION, SOLUTION INTRAVENOUS CONTINUOUS
Status: DISCONTINUED | OUTPATIENT
Start: 2023-08-07 | End: 2023-08-07 | Stop reason: HOSPADM

## 2023-08-07 RX ORDER — PROPOFOL 10 MG/ML
INJECTION, EMULSION INTRAVENOUS PRN
Status: DISCONTINUED | OUTPATIENT
Start: 2023-08-07 | End: 2023-08-07 | Stop reason: SDUPTHER

## 2023-08-07 RX ORDER — SODIUM CHLORIDE 0.9 % (FLUSH) 0.9 %
5-40 SYRINGE (ML) INJECTION EVERY 12 HOURS SCHEDULED
Status: CANCELLED | OUTPATIENT
Start: 2023-08-07

## 2023-08-07 RX ADMIN — PROPOFOL 50 MG: 10 INJECTION, EMULSION INTRAVENOUS at 08:55

## 2023-08-07 RX ADMIN — PROPOFOL 50 MG: 10 INJECTION, EMULSION INTRAVENOUS at 08:44

## 2023-08-07 RX ADMIN — PROPOFOL 50 MG: 10 INJECTION, EMULSION INTRAVENOUS at 09:02

## 2023-08-07 RX ADMIN — PROPOFOL 50 MG: 10 INJECTION, EMULSION INTRAVENOUS at 08:40

## 2023-08-07 RX ADMIN — PROPOFOL 50 MG: 10 INJECTION, EMULSION INTRAVENOUS at 08:50

## 2023-08-07 RX ADMIN — SODIUM CHLORIDE: 9 INJECTION, SOLUTION INTRAVENOUS at 07:51

## 2023-08-07 ASSESSMENT — PAIN - FUNCTIONAL ASSESSMENT: PAIN_FUNCTIONAL_ASSESSMENT: NONE - DENIES PAIN

## 2023-08-07 ASSESSMENT — COPD QUESTIONNAIRES: CAT_SEVERITY: MILD

## 2023-08-07 NOTE — H&P
[place] [address]                              HISTORY AND PHYSICAL    PATIENT NAME: Ashlie July                    :        1955  MED REC NO:   [id number]                           ROOM:  ACCOUNT NO:   [de-identified]                           ADMIT DATE: 2023  PROVIDER:     Sonia Ryo. Karen Savage M.D. INDICATION:  Blood per rectum. She had a positive Cologuard test.    HISTORY OF PRESENT ILLNESS:  The patient is a 76-year-old female who has  a past medical history of COPD, tobacco use, family history of breast  cancer as well as anemia. She was seen in the office with the positive  Cologuard test.  We are proceeding with upper endoscopy. PAST MEDICAL HISTORY:  As previously listed and unchanged. MEDICATIONS:  As previously listed and unchanged. ALLERGIES:  AS PREVIOUSLY LISTED AND UNCHANGED. SOCIAL HISTORY:  As previously listed and unchanged. FAMILY HISTORY:  As previously listed and unchanged. REVIEW OF SYSTEMS:  As previously listed and unchanged. PHYSICAL EXAMINATION:  GENERAL:  Awake, alert, and oriented x3. VITAL SIGNS:  The patient is afebrile. Heart rate 70, respiratory rate  18, blood pressure 128/72, oxygen saturation 100%. HEENT:  Normocephalic and atraumatic. NECK:  Supple. No JVD. LUNGS:  Clear. HEART:  Regular rate. ABDOMEN:  Soft, nontender. RECTAL:  Will be done with colonoscopy. EXTREMITIES:  Without edema. NEUROLOGIC:  Awake, alert, oriented x3. IMPRESSION:  1. Positive Cologuard, blood in the stool. 2.  History of anemia. 3.  Tobacco use. PLAN:  Colonoscopy today. CHANDAN Savage M.D.    D: 2023 8:38:51       T: 2023 9:03:38     /[email or web address]  Job#: [id number]     Doc#: [id number]    CC:

## 2023-08-07 NOTE — PROGRESS NOTES
Recovery mode. Patient denies discomfort. Passing gas, taking fluids. Dr. Eddi Reddy discussed findings with patient and . Discharge instructions provided and understanding verbalized.

## 2023-08-07 NOTE — ANESTHESIA POSTPROCEDURE EVALUATION
Department of Anesthesiology  Postprocedure Note    Patient: Ele Draper  MRN: [id number]  YOB: 1955  Date of evaluation: 8/7/2023      Procedure Summary     Date: 08/07/23 Room / Location: [address] [place], [place] [address] / [address] [place] [address]    Anesthesia Start: 2370 Anesthesia Stop: 8525    Procedure: COLONOSCOPY Diagnosis:       Positive colorectal cancer screening using Cologuard test      (Positive colorectal cancer screening using Cologuard test [R19.5])    Surgeons: Anais Corral MD Responsible Provider: Lara Reinoso MD    Anesthesia Type: MAC ASA Status: 2          Anesthesia Type: No value filed.     Elgin Phase I: Elgin Score: 10    Elgin Phase II:        Anesthesia Post Evaluation    Patient location during evaluation: bedside  Patient participation: complete - patient participated  Level of consciousness: awake and alert  Airway patency: patent  Nausea & Vomiting: no nausea and no vomiting  Complications: no  Cardiovascular status: hemodynamically stable  Respiratory status: acceptable, room air and spontaneous ventilation  Hydration status: euvolemic  Pain management: adequate

## 2023-08-07 NOTE — OP NOTE
[place] [address]                                OPERATIVE REPORT    PATIENT NAME: Piero Gamboa                    :        1955  MED REC NO:   [id number]                           ROOM:  ACCOUNT NO:   [de-identified]                           ADMIT DATE: 2023  PROVIDER:     Betty Carbajal M.D.    Geovanni Woodallus:  2023    PROCEDURE PERFORMED:  Colonoscopy. INDICATION:  Blood in the stool and positive Cologuard. SURGEON:  Betty Taylor. Birdie Carbajal MD    ANESTHESIA:  IV Diprivan per Anesthesia. DESCRIPTION OF PROCEDURE:  Prior to procedure, risks, benefits,  complications (including but not limited to the following; bleeding,  infection, perforation, emergency surgery, stroke, heart attack, death,  possible anesthetic risks, and the fact that the procedure is not 100%  accurate or successful), indications, and alternatives of colonoscopy  were explained to the patient. The patient understood and agreed to the  procedure. All questions were answered. With the patient in the left lateral decubitus position, digital rectal  exam was done, which was normal.  Colonoscope was introduced into the  rectum. Mucosa appeared normal.  Colonoscope was advanced to the  rectosigmoid colon, descending colon, transverse colon, ascending colon,  and cecum. There was liquid stool throughout the colon limiting  visibility of the mucosa. The cecum was confirmed by visualization of  the ileocecal valve, visualization of appendiceal orifice, and  visualization of the entire terminal ileum. Colonoscope was carefully traced back to the colon. Colon was  decompressed along the way. Colonic wall was carefully inspected along  the way. Colonoscope was retraced back to the ascending colon and  hepatic flexure, reintroduced into the cecum for \"second look. \"   Nicholas Jean-Baptiste was traced back to the ascending colon, transverse

## 2023-08-07 NOTE — BRIEF OP NOTE
Brief Postoperative Note      Patient: Ashlie July  YOB: 1955  MRN: [id number]    Date of Procedure: 8/7/2023    Pre-Op Diagnosis Codes:     * Positive colorectal cancer screening using Cologuard test [R19.5]    Post-Op Diagnosis: Same       Procedure(s):  COLONOSCOPY    Surgeon(s):   Jesus Barnes MD    Assistant:  * No surgical staff found *    Anesthesia: Monitor Anesthesia Care    Estimated Blood Loss (mL): Minimal    Complications: None    Specimens:   * No specimens in log *    Implants:  * No implants in log *      Drains: * No LDAs found *    Findings: above      Electronically signed by Jesus Barnes MD on 8/7/2023 at 9:08 AM

## 2023-08-07 NOTE — OP NOTE
Operative Note      Patient: Leyda Howell  YOB: 1955  MRN: [id number]    Date of Procedure: 8/7/2023    Pre-Op Diagnosis Codes:     * Positive colorectal cancer screening using Cologuard test [R19.5]    Post-Op Diagnosis: Same       Procedure(s):  COLONOSCOPY    Surgeon(s):   Radha Amaya MD    Assistant:   * No surgical staff found *    Anesthesia: Monitor Anesthesia Care    Estimated Blood Loss (mL): Minimal    Complications: None    Specimens:   * No specimens in log *    Implants:  * No implants in log *      Drains: * No LDAs found *    Findings: above        Detailed Description of Procedure:   dictated    Electronically signed by Radha Amaya MD on 8/7/2023 at 9:08 AM

## 2023-09-20 ENCOUNTER — OFFICE VISIT (OUTPATIENT)
Dept: FAMILY MEDICINE CLINIC | Age: 68
End: 2023-09-20
Payer: MEDICARE

## 2023-09-20 VITALS
DIASTOLIC BLOOD PRESSURE: 66 MMHG | SYSTOLIC BLOOD PRESSURE: 118 MMHG | WEIGHT: 110 LBS | HEART RATE: 79 BPM | OXYGEN SATURATION: 100 % | RESPIRATION RATE: 16 BRPM | HEIGHT: 64 IN | BODY MASS INDEX: 18.78 KG/M2

## 2023-09-20 DIAGNOSIS — J43.9 PULMONARY EMPHYSEMA, UNSPECIFIED EMPHYSEMA TYPE (HCC): Primary | ICD-10-CM

## 2023-09-20 DIAGNOSIS — Z87.891 HISTORY OF SMOKING 30 OR MORE PACK YEARS: ICD-10-CM

## 2023-09-20 PROCEDURE — 99213 OFFICE O/P EST LOW 20 MIN: CPT | Performed by: NURSE PRACTITIONER

## 2023-09-20 PROCEDURE — 1123F ACP DISCUSS/DSCN MKR DOCD: CPT | Performed by: NURSE PRACTITIONER

## 2023-09-20 ASSESSMENT — ENCOUNTER SYMPTOMS
SHORTNESS OF BREATH: 0
ABDOMINAL PAIN: 0
NAUSEA: 0
VOMITING: 0
SORE THROAT: 0
TROUBLE SWALLOWING: 0
BACK PAIN: 0
WHEEZING: 0
DIARRHEA: 0
EYE REDNESS: 0
COUGH: 0
RHINORRHEA: 0
CONSTIPATION: 0
ALLERGIC/IMMUNOLOGIC NEGATIVE: 1
EYE PAIN: 0
EYE DISCHARGE: 0

## 2023-09-20 NOTE — PROGRESS NOTES
[address] MEDICINE  [address] [facility] [address]  [address]  [facility] [address]  Dept: [phone]  Dept Fax: [phone]  Loc: [phone]     Visit Date:  9/20/2023      Patient:  Sylvia Oliveros  YOB: 1955    HPI:     Chief Complaint   Patient presents with    6 Month Follow-Up     Already had flu shot       Patient for 6-month follow-up, no acute complaints. Medications    Current Outpatient Medications:     metoprolol tartrate (LOPRESSOR) 25 MG tablet, Take 1 tablet by mouth twice daily, Disp: 180 tablet, Rfl: 2    aspirin 81 MG EC tablet, Take 1 tablet by mouth daily, Disp: , Rfl:     calcium carbonate (OSCAL) 500 MG TABS tablet, Take 1 tablet by mouth daily, Disp: , Rfl:     ferrous sulfate (IRON 325) 325 (65 Fe) MG tablet, Take 1 tablet by mouth Daily with lunch, Disp: , Rfl:     Multiple Vitamins-Minerals (PRESERVISION AREDS PO), Take 2 tablets by mouth daily, Disp: , Rfl:     Multiple Vitamin TABS, Take by mouth daily, Disp: , Rfl:     Biotin 1000 MCG TABS, Take by mouth daily, Disp: , Rfl:     The patient is allergic to augmentin [amoxicillin-pot clavulanate]. Past Medical History  Jamie Payne  has a past medical history of Anemia, COPD (chronic obstructive pulmonary disease) ([address]), Family history of breast cancer in sister, and Valvular heart disease. Subjective:      Review of Systems   Constitutional:  Negative for activity change, fatigue and fever. HENT:  Negative for congestion, ear pain, rhinorrhea, sore throat and trouble swallowing. Eyes:  Negative for pain, discharge and redness. Respiratory:  Negative for cough, shortness of breath and wheezing. Cardiovascular: Negative. Gastrointestinal:  Negative for abdominal pain, constipation, diarrhea, nausea and vomiting. Endocrine: Negative. Genitourinary:  Negative for dysuria, frequency and urgency. Musculoskeletal:  Negative for arthralgias, back pain and myalgias.

## 2023-09-28 ENCOUNTER — OFFICE VISIT (OUTPATIENT)
Dept: CARDIOLOGY CLINIC | Age: 68
End: 2023-09-28

## 2023-09-28 VITALS
WEIGHT: 109.6 LBS | DIASTOLIC BLOOD PRESSURE: 70 MMHG | BODY MASS INDEX: 18.71 KG/M2 | SYSTOLIC BLOOD PRESSURE: 110 MMHG | HEIGHT: 64 IN | HEART RATE: 66 BPM

## 2023-09-28 DIAGNOSIS — Z95.3 FOLLOW-UP VISIT FOR AORTIC VALVE REPLACEMENT WITH BIOPROSTHETIC VALVE: Primary | ICD-10-CM

## 2023-09-28 DIAGNOSIS — Z09 FOLLOW-UP VISIT FOR AORTIC VALVE REPLACEMENT WITH BIOPROSTHETIC VALVE: Primary | ICD-10-CM

## 2023-09-28 NOTE — PROGRESS NOTES
regular rhythm, normal heart sounds and intact distal pulses. No murmur heard. Pulmonary/Chest: Effort normal and breath sounds normal. No respiratory distress. No wheezes. No rales. Abdominal: Soft. Bowel sounds are normal. No distension. There is no tenderness. Musculoskeletal: Normal range of motion. No edema. Neurological: Alert and oriented to person, place, and time. No cranial nerve deficit. Coordination normal.   Skin: Skin is warm and dry. Psychiatric: Normal mood and affect.        No results found for: \"CKTOTAL\", \"CKMB\", \"CKMBINDEX\"    Lab Results   Component Value Date/Time    WBC 3.9 01/11/2022 10:39 AM    RBC 3.28 01/11/2022 10:39 AM    HGB 10.4 01/11/2022 10:39 AM    HCT 33.9 01/11/2022 10:39 AM    .4 01/11/2022 10:39 AM    MCH 31.7 01/11/2022 10:39 AM    MCHC 30.7 01/11/2022 10:39 AM    RDW 12.5 06/13/2012 05:30 AM     01/11/2022 10:39 AM    MPV 10.6 01/11/2022 10:39 AM       Lab Results   Component Value Date/Time     12/07/2021 04:11 PM    K 4.6 12/07/2021 04:11 PM    CL 98 12/07/2021 04:11 PM    CO2 26 12/07/2021 04:11 PM    BUN 33 12/07/2021 04:11 PM    LABALBU 4.1 06/23/2020 08:47 AM    CREATININE 1.1 12/07/2021 04:11 PM    CALCIUM 9.2 12/07/2021 04:11 PM    LABGLOM 49 12/07/2021 04:11 PM    GLUCOSE 104 12/07/2021 04:11 PM       Lab Results   Component Value Date/Time    ALKPHOS 71 06/23/2020 08:47 AM    ALT 18 06/23/2020 08:47 AM    AST 22 06/23/2020 08:47 AM    PROT 6.3 06/23/2020 08:47 AM    BILITOT 0.4 06/23/2020 08:47 AM    BILIDIR <0.2 06/23/2020 08:47 AM    LABALBU 4.1 06/23/2020 08:47 AM       Lab Results   Component Value Date/Time    MG 1.9 11/23/2021 05:31 AM       Lab Results   Component Value Date    INR 1.07 11/15/2021    INR 1.07 10/20/2021    INR 0.97 06/11/2012         Lab Results   Component Value Date/Time    LABA1C 4.6 11/15/2021 08:15 AM       Lab Results   Component Value Date/Time    TRIG 30 10/20/2021 09:37 AM    HDL 61 10/20/2021 09:37 AM

## 2023-12-29 DIAGNOSIS — Z09 FOLLOW-UP VISIT FOR AORTIC VALVE REPLACEMENT WITH BIOPROSTHETIC VALVE: ICD-10-CM

## 2023-12-29 DIAGNOSIS — Z95.3 FOLLOW-UP VISIT FOR AORTIC VALVE REPLACEMENT WITH BIOPROSTHETIC VALVE: ICD-10-CM

## 2023-12-29 NOTE — TELEPHONE ENCOUNTER
Gisela M Carlson called requesting a refill on the following medications:  Requested Prescriptions     Pending Prescriptions Disp Refills    metoprolol tartrate (LOPRESSOR) 25 MG tablet 180 tablet 2     Sig: Take 1 tablet by mouth 2 times daily     Pharmacy verified:[facility] [address]  .pv      Date of last visit: 9-28-23  Date of next visit (if applicable): 9-16-24

## 2023-12-31 DIAGNOSIS — Z95.3 FOLLOW-UP VISIT FOR AORTIC VALVE REPLACEMENT WITH BIOPROSTHETIC VALVE: ICD-10-CM

## 2023-12-31 DIAGNOSIS — Z09 FOLLOW-UP VISIT FOR AORTIC VALVE REPLACEMENT WITH BIOPROSTHETIC VALVE: ICD-10-CM

## 2024-03-18 ENCOUNTER — OFFICE VISIT (OUTPATIENT)
Dept: FAMILY MEDICINE CLINIC | Age: 69
End: 2024-03-18
Payer: MEDICARE

## 2024-03-18 VITALS
RESPIRATION RATE: 16 BRPM | SYSTOLIC BLOOD PRESSURE: 100 MMHG | OXYGEN SATURATION: 98 % | TEMPERATURE: 97.4 F | WEIGHT: 110 LBS | HEART RATE: 78 BPM | BODY MASS INDEX: 18.88 KG/M2 | DIASTOLIC BLOOD PRESSURE: 52 MMHG

## 2024-03-18 DIAGNOSIS — I24.81 ACUTE CORONARY MICROVASCULAR DYSFUNCTION: ICD-10-CM

## 2024-03-18 DIAGNOSIS — D64.9 NORMOCYTIC ANEMIA: ICD-10-CM

## 2024-03-18 DIAGNOSIS — J41.0 SIMPLE CHRONIC BRONCHITIS (HCC): Primary | ICD-10-CM

## 2024-03-18 DIAGNOSIS — I42.8 OTHER CARDIOMYOPATHIES (HCC): ICD-10-CM

## 2024-03-18 PROCEDURE — 1123F ACP DISCUSS/DSCN MKR DOCD: CPT | Performed by: NURSE PRACTITIONER

## 2024-03-18 PROCEDURE — 99213 OFFICE O/P EST LOW 20 MIN: CPT | Performed by: NURSE PRACTITIONER

## 2024-03-18 SDOH — ECONOMIC STABILITY: FOOD INSECURITY: WITHIN THE PAST 12 MONTHS, YOU WORRIED THAT YOUR FOOD WOULD RUN OUT BEFORE YOU GOT MONEY TO BUY MORE.: NEVER TRUE

## 2024-03-18 SDOH — ECONOMIC STABILITY: FOOD INSECURITY: WITHIN THE PAST 12 MONTHS, THE FOOD YOU BOUGHT JUST DIDN'T LAST AND YOU DIDN'T HAVE MONEY TO GET MORE.: NEVER TRUE

## 2024-03-18 SDOH — ECONOMIC STABILITY: INCOME INSECURITY: HOW HARD IS IT FOR YOU TO PAY FOR THE VERY BASICS LIKE FOOD, HOUSING, MEDICAL CARE, AND HEATING?: NOT HARD AT ALL

## 2024-03-18 ASSESSMENT — ENCOUNTER SYMPTOMS
VOMITING: 0
SHORTNESS OF BREATH: 0
ABDOMINAL PAIN: 0
DIARRHEA: 0
CONSTIPATION: 0
BACK PAIN: 0
ALLERGIC/IMMUNOLOGIC NEGATIVE: 1
EYE DISCHARGE: 0
WHEEZING: 0
SORE THROAT: 0
EYE PAIN: 0
TROUBLE SWALLOWING: 0
NAUSEA: 0
COUGH: 0
RHINORRHEA: 0

## 2024-03-18 ASSESSMENT — PATIENT HEALTH QUESTIONNAIRE - PHQ9
1. LITTLE INTEREST OR PLEASURE IN DOING THINGS: NOT AT ALL
2. FEELING DOWN, DEPRESSED OR HOPELESS: NOT AT ALL
SUM OF ALL RESPONSES TO PHQ9 QUESTIONS 1 & 2: 0
SUM OF ALL RESPONSES TO PHQ QUESTIONS 1-9: 0

## 2024-03-18 NOTE — PROGRESS NOTES
[facility]  [address] [address]  Dept: [phone]  Dept Fax: [phone]  Loc: [phone]     Visit Date:  3/18/2024      Patient:  Gisela [place]  YOB: 1955    HPI:     Chief Complaint   Patient presents with    6 Month Follow-Up       Patient here for routine 6-month follow-up.  States she has put on a little weight and she is happy about that.         Medications    Current Outpatient Medications:     metoprolol tartrate (LOPRESSOR) 25 MG tablet, Take 1 tablet by mouth twice daily, Disp: 180 tablet, Rfl: 2    aspirin 81 MG EC tablet, Take 1 tablet by mouth daily, Disp: , Rfl:     calcium carbonate (OSCAL) 500 MG TABS tablet, Take 1 tablet by mouth daily, Disp: , Rfl:     ferrous sulfate (IRON 325) 325 (65 Fe) MG tablet, Take 1 tablet by mouth Daily with lunch, Disp: , Rfl:     Multiple Vitamins-Minerals (PRESERVISION AREDS PO), Take 2 tablets by mouth daily, Disp: , Rfl:     Multiple Vitamin TABS, Take by mouth daily, Disp: , Rfl:     Biotin 1000 MCG TABS, Take by mouth daily, Disp: , Rfl:     The patient is allergic to augmentin [amoxicillin-pot clavulanate].    Past Medical History  Gisela  has a past medical history of Anemia, COPD (chronic obstructive pulmonary disease) ([facility]), Family history of breast cancer in sister, and Valvular heart disease.    Subjective:      Review of Systems   Constitutional:  Negative for activity change, fatigue and fever.   HENT:  Negative for congestion, ear pain, rhinorrhea, sore throat and trouble swallowing.    Eyes:  Negative for pain, discharge and redness.   Respiratory:  Negative for cough, shortness of breath and wheezing.    Cardiovascular: Negative.    Gastrointestinal:  Negative for abdominal pain, constipation, diarrhea, nausea and vomiting.   Endocrine: Negative.    Genitourinary:  Negative for dysuria, frequency and urgency.   Musculoskeletal:  Negative for

## 2024-03-25 LAB
CHOLESTEROL, TOTAL: 109 MG/DL
CHOLESTEROL/HDL RATIO: NORMAL
HDLC SERPL-MCNC: 53 MG/DL (ref 35–70)
LDL CHOLESTEROL CALCULATED: 41 MG/DL (ref 0–160)
NONHDLC SERPL-MCNC: NORMAL MG/DL
TRIGL SERPL-MCNC: 70 MG/DL
VLDLC SERPL CALC-MCNC: 15 MG/DL

## 2024-03-26 ENCOUNTER — TELEPHONE (OUTPATIENT)
Dept: FAMILY MEDICINE CLINIC | Age: 69
End: 2024-03-26

## 2024-03-26 DIAGNOSIS — D69.6 PLATELETS DECREASED (HCC): Primary | ICD-10-CM

## 2024-03-26 NOTE — TELEPHONE ENCOUNTER
Left VM to call back regarding results. Referral has already been placed to [facility] and patient needs notified.

## 2024-03-26 NOTE — TELEPHONE ENCOUNTER
----- Message from NATALY Vázquez CNP sent at 3/26/2024  2:42 PM EDT -----  Based on concerning findings and the patient's CBC, I am referring her to hematology for evaluation and an order has already been placed.  Please contact the patient.

## 2024-03-26 NOTE — TELEPHONE ENCOUNTER
Patient informed. She says she has other appointments and can't take this much time off of work. She wants to hold on the referral at this time.

## 2024-04-12 ENCOUNTER — HOSPITAL ENCOUNTER (OUTPATIENT)
Dept: WOMENS IMAGING | Age: 69
Discharge: HOME | End: 2024-04-12
Payer: MEDICARE

## 2024-04-12 VITALS — WEIGHT: 110 LBS | HEIGHT: 64 IN | BODY MASS INDEX: 18.78 KG/M2

## 2024-04-12 DIAGNOSIS — Z12.31 ENCOUNTER FOR SCREENING MAMMOGRAM FOR MALIGNANT NEOPLASM OF BREAST: ICD-10-CM

## 2024-04-12 PROCEDURE — 77067 SCR MAMMO BI INCL CAD: CPT

## 2024-04-15 ENCOUNTER — OFFICE VISIT (OUTPATIENT)
Dept: ONCOLOGY | Age: 69
End: 2024-04-15

## 2024-04-15 ENCOUNTER — HOSPITAL ENCOUNTER (OUTPATIENT)
Dept: INFUSION THERAPY | Age: 69
Discharge: HOME | End: 2024-04-15
Payer: MEDICARE

## 2024-04-15 VITALS
HEIGHT: 64 IN | WEIGHT: 109 LBS | HEART RATE: 81 BPM | BODY MASS INDEX: 18.61 KG/M2 | SYSTOLIC BLOOD PRESSURE: 126 MMHG | RESPIRATION RATE: 16 BRPM | TEMPERATURE: 97.8 F | OXYGEN SATURATION: 98 % | DIASTOLIC BLOOD PRESSURE: 55 MMHG

## 2024-04-15 VITALS
DIASTOLIC BLOOD PRESSURE: 55 MMHG | SYSTOLIC BLOOD PRESSURE: 126 MMHG | TEMPERATURE: 97.8 F | RESPIRATION RATE: 16 BRPM | HEART RATE: 81 BPM | OXYGEN SATURATION: 98 %

## 2024-04-15 DIAGNOSIS — D64.9 NORMOCYTIC ANEMIA: ICD-10-CM

## 2024-04-15 DIAGNOSIS — D61.818 PANCYTOPENIA (HCC): ICD-10-CM

## 2024-04-15 DIAGNOSIS — D61.818 PANCYTOPENIA (HCC): Primary | ICD-10-CM

## 2024-04-15 LAB
BASOPHILS ABSOLUTE: 0 THOU/MM3 (ref 0–0.1)
BASOPHILS NFR BLD AUTO: 1 % (ref 0–3)
EOSINOPHIL NFR BLD AUTO: 1 % (ref 0–4)
EOSINOPHILS ABSOLUTE: 0 THOU/MM3 (ref 0–0.4)
ERYTHROCYTE [DISTWIDTH] IN BLOOD BY AUTOMATED COUNT: 12.6 % (ref 11.5–14.5)
FERRITIN SERPL IA-MCNC: 98 NG/ML (ref 10–291)
FOLATE SERPL-MCNC: > 20 NG/ML (ref 4.8–24.2)
HCT VFR BLD AUTO: 33.2 % (ref 37–47)
HGB BLD-MCNC: 10.4 GM/DL (ref 12–16)
HGB RETIC QN AUTO: 34.7 PG (ref 28.2–35.7)
IMM RETICS NFR: 10.3 % (ref 3–15.9)
IMMATURE GRANULOCYTES %: 0 %
IMMATURE GRANULOCYTES ABSOLUTE: 0 THOU/MM3 (ref 0–0.07)
IRON SERPL-MCNC: 78 UG/DL (ref 50–170)
LYMPHOCYTES ABSOLUTE: 0.9 THOU/MM3 (ref 1–4.8)
LYMPHOCYTES NFR BLD AUTO: 32 % (ref 15–47)
MCH RBC QN AUTO: 32.2 PG (ref 26–33)
MCHC RBC AUTO-ENTMCNC: 31.3 GM/DL (ref 32.2–35.5)
MCV RBC AUTO: 103 FL (ref 81–99)
MONOCYTES ABSOLUTE: 1.1 THOU/MM3 (ref 0.4–1.3)
MONOCYTES NFR BLD AUTO: 38 % (ref 0–12)
NEUTROPHILS NFR BLD AUTO: 29 % (ref 43–75)
PLATELET # BLD AUTO: 78 THOU/MM3 (ref 130–400)
PMV BLD AUTO: 11.4 FL (ref 9.4–12.4)
RBC # BLD AUTO: 3.23 MILL/MM3 (ref 4.2–5.4)
RETICS # AUTO: 84 THOU/MM3 (ref 20–115)
RETICS/RBC NFR AUTO: 2.6 % (ref 0.5–2)
SEGMENTED NEUTROPHILS ABSOLUTE COUNT: 0.9 THOU/MM3 (ref 1.8–7.7)
TIBC SERPL-MCNC: 247 UG/DL (ref 171–450)
TSH SERPL DL<=0.005 MIU/L-ACNC: 1.75 UIU/ML (ref 0.4–4.2)
VIT B12 SERPL-MCNC: 1327 PG/ML (ref 211–911)
WBC # BLD AUTO: 3 THOU/MM3 (ref 4.8–10.8)

## 2024-04-15 PROCEDURE — 99211 OFF/OP EST MAY X REQ PHY/QHP: CPT

## 2024-04-15 PROCEDURE — 82746 ASSAY OF FOLIC ACID SERUM: CPT

## 2024-04-15 PROCEDURE — 85025 COMPLETE CBC W/AUTO DIFF WBC: CPT

## 2024-04-15 PROCEDURE — 36415 COLL VENOUS BLD VENIPUNCTURE: CPT

## 2024-04-15 PROCEDURE — 83540 ASSAY OF IRON: CPT

## 2024-04-15 PROCEDURE — 82607 VITAMIN B-12: CPT

## 2024-04-15 PROCEDURE — 82728 ASSAY OF FERRITIN: CPT

## 2024-04-15 PROCEDURE — 85046 RETICYTE/HGB CONCENTRATE: CPT

## 2024-04-15 PROCEDURE — 83550 IRON BINDING TEST: CPT

## 2024-04-15 PROCEDURE — 88184 FLOWCYTOMETRY/ TC 1 MARKER: CPT

## 2024-04-15 PROCEDURE — 88185 FLOWCYTOMETRY/TC ADD-ON: CPT

## 2024-04-15 PROCEDURE — 84443 ASSAY THYROID STIM HORMONE: CPT

## 2024-04-15 NOTE — PROGRESS NOTES
[facility] [address], [address]  [facility] [address]  Dept: [phone]  Dept Fax: [phone] Loc: [phone]      Visit Date:4/15/2024     Gisela Richmond is a 69 y.o. female who presents today for:   Chief Complaint   Patient presents with    Follow-up     Normocytic anemia        HPI:   Gisela Richmond is a 69 y.o. female referred to Hematology/Oncology clinic for evaluation of thrombocytopenia per her PCP, GABRIELLE Vázquez. She recently had routine lab work completed on 3/25/2024. CBC showed WBC count 2.8, Hgb 11.0, Hct 33.7, MCV 99, platelet count 76,000. She was referred for further evaluation. BMP same date showed Cr 0.78, calcium 9.1 within normal limits. Total protein 6.5 and LFT within normal limits.    The patient states she has been feeling well overall.  She affirms recent GI viral illness.  She states this occurred several weeks before having recent labs.  She denies unintentional weight loss, poor appetite, early satiety, persistent fever, recurrent infections, night sweats, or lymphadenopathy.  She denies history of autoimmune disease or liver disease. She states \"I don't eat as good now\". Denies chest pain, SOB, abdominal pain, nausea, vomiting, bowel changes. Denies any abnormal bleeding. Per review of EMR, she had colonoscopy on 8/7/23 per Dr. Piña showing no polyps seen, some residual liquid stool limiting visibility, hemorrhiods. She was recommended 10 year follow up.   PMH includes history of AVR, COPD. She affirms drinking approximately 3 beers per day. She is a not a current smoker; quit at age 66 smoked 6 years 1 ppd.       Past Medical History:   Diagnosis Date    Anemia     COPD (chronic obstructive pulmonary disease) (HCC)     Family history of breast cancer in sister 6/19/2020    Valvular heart disease 09/2021    Aortic Valve      Past Surgical History:   Procedure Laterality Date    AORTIC VALVE REPLACEMENT N/A 11/19/2021    Aortic valve

## 2024-04-15 NOTE — PATIENT INSTRUCTIONS
Will obtain CBC, ferritin, iron, TIBC, folate, vitamin B12, reticulocyte count, TSH, flow cytometry   Return to clinic in 3 weeks to review labs  Please call for questions or concerns.

## 2024-04-18 LAB — LEUK/LYMPH PHENOTYPING WB: NORMAL

## 2024-05-06 ENCOUNTER — HOSPITAL ENCOUNTER (OUTPATIENT)
Dept: INFUSION THERAPY | Age: 69
Discharge: HOME | End: 2024-05-06
Payer: MEDICARE

## 2024-05-06 ENCOUNTER — OFFICE VISIT (OUTPATIENT)
Dept: ONCOLOGY | Age: 69
End: 2024-05-06
Payer: MEDICARE

## 2024-05-06 VITALS
RESPIRATION RATE: 18 BRPM | BODY MASS INDEX: 18.61 KG/M2 | OXYGEN SATURATION: 97 % | SYSTOLIC BLOOD PRESSURE: 118 MMHG | HEART RATE: 69 BPM | DIASTOLIC BLOOD PRESSURE: 55 MMHG | WEIGHT: 109 LBS | TEMPERATURE: 98.2 F | HEIGHT: 64 IN

## 2024-05-06 VITALS
HEART RATE: 69 BPM | DIASTOLIC BLOOD PRESSURE: 55 MMHG | TEMPERATURE: 98.2 F | RESPIRATION RATE: 18 BRPM | SYSTOLIC BLOOD PRESSURE: 118 MMHG

## 2024-05-06 DIAGNOSIS — D61.818 PANCYTOPENIA (HCC): Primary | ICD-10-CM

## 2024-05-06 DIAGNOSIS — D72.819 LEUKOPENIA, UNSPECIFIED TYPE: ICD-10-CM

## 2024-05-06 DIAGNOSIS — D69.6 THROMBOCYTOPENIA (HCC): ICD-10-CM

## 2024-05-06 DIAGNOSIS — D64.9 NORMOCYTIC ANEMIA: ICD-10-CM

## 2024-05-06 PROCEDURE — 1123F ACP DISCUSS/DSCN MKR DOCD: CPT | Performed by: PHYSICIAN ASSISTANT

## 2024-05-06 PROCEDURE — 99211 OFF/OP EST MAY X REQ PHY/QHP: CPT

## 2024-05-06 PROCEDURE — 99214 OFFICE O/P EST MOD 30 MIN: CPT | Performed by: PHYSICIAN ASSISTANT

## 2024-05-06 NOTE — PATIENT INSTRUCTIONS
Return to clinic in 4 weeks  Labs on RTC  Please have Kinsey call patient to see if qualifies for financial assistance  Please call if you decide to proceed with bone marrow biopsy  Please call for questions or concerns.

## 2024-05-06 NOTE — PROGRESS NOTES
Hgb 10.4, Hct 33.2, , platelet count 78,000.  - Ferritin 98, iron 78, TIBC 247 within normal limits  - Vitamin B12 & Folate - folate >20, vitamin B12 1327. Not deficient.   - TSH with Reflex - within normal limits  - Reticulocytes - ab 2.6%.   - Leukemia / Lymphoma Phenotype - relative increase in mature monocytes without immunophenotypic aberrancy accounting for 47% of viable leukocytes and decreased granulocytes identified.  These findings are nonspecific and may be observed in reactive setting as well as myeloid neoplasms.    Reviewed with patient recommendation for bone marrow biopsy.  Discussed due to persistent pancytopenia with abnormal flow cytometry bone marrow biopsy is recommended to evaluate potential causes.  Reviewed procedure of bone marrow biopsy.  The patient states she is hesitant to proceed at this time due to financial constraints.  Will contact our financial navigator for assistance.   -Will continue to monitor blood count   -Discussed with patient to call office if she decides to proceed with bone marrow biopsy prior to follow-up visit   -Return to clinic in 4 weeks   -Labs on RTC      All patient questions answered. Pt voiced understanding. Patient agreed with treatment plan. Follow up as directed. Patient instructed to call for questions or concerns.          Electronically signed by   Ashlee Moreau PA-C

## 2024-06-03 ENCOUNTER — HOSPITAL ENCOUNTER (OUTPATIENT)
Dept: INFUSION THERAPY | Age: 69
Discharge: HOME | End: 2024-06-03
Payer: MEDICARE

## 2024-06-03 ENCOUNTER — OFFICE VISIT (OUTPATIENT)
Dept: ONCOLOGY | Age: 69
End: 2024-06-03
Payer: MEDICARE

## 2024-06-03 ENCOUNTER — SOCIAL WORK (OUTPATIENT)
Dept: INFUSION THERAPY | Age: 69
End: 2024-06-03

## 2024-06-03 VITALS
HEART RATE: 75 BPM | WEIGHT: 109.4 LBS | DIASTOLIC BLOOD PRESSURE: 75 MMHG | SYSTOLIC BLOOD PRESSURE: 140 MMHG | BODY MASS INDEX: 18.78 KG/M2 | RESPIRATION RATE: 16 BRPM | TEMPERATURE: 97.5 F | OXYGEN SATURATION: 98 %

## 2024-06-03 VITALS
SYSTOLIC BLOOD PRESSURE: 140 MMHG | DIASTOLIC BLOOD PRESSURE: 75 MMHG | RESPIRATION RATE: 16 BRPM | HEART RATE: 75 BPM | OXYGEN SATURATION: 98 % | TEMPERATURE: 97.5 F

## 2024-06-03 DIAGNOSIS — D69.6 THROMBOCYTOPENIA (HCC): ICD-10-CM

## 2024-06-03 DIAGNOSIS — D72.819 LEUKOPENIA, UNSPECIFIED TYPE: ICD-10-CM

## 2024-06-03 DIAGNOSIS — D61.818 PANCYTOPENIA (HCC): Primary | ICD-10-CM

## 2024-06-03 DIAGNOSIS — D64.9 NORMOCYTIC ANEMIA: ICD-10-CM

## 2024-06-03 DIAGNOSIS — D61.818 PANCYTOPENIA (HCC): ICD-10-CM

## 2024-06-03 LAB
BASOPHILS ABSOLUTE: 0 THOU/MM3 (ref 0–0.1)
BASOPHILS NFR BLD AUTO: 1 % (ref 0–3)
EOSINOPHIL NFR BLD AUTO: 1 % (ref 0–4)
EOSINOPHILS ABSOLUTE: 0 THOU/MM3 (ref 0–0.4)
ERYTHROCYTE [DISTWIDTH] IN BLOOD BY AUTOMATED COUNT: 12.3 % (ref 11.5–14.5)
HCT VFR BLD AUTO: 34.9 % (ref 37–47)
HGB BLD-MCNC: 11.1 GM/DL (ref 12–16)
IMMATURE GRANULOCYTES %: 0 %
IMMATURE GRANULOCYTES ABSOLUTE: 0 THOU/MM3 (ref 0–0.07)
LYMPHOCYTES ABSOLUTE: 0.8 THOU/MM3 (ref 1–4.8)
LYMPHOCYTES NFR BLD AUTO: 27 % (ref 15–47)
MCH RBC QN AUTO: 32.5 PG (ref 26–33)
MCHC RBC AUTO-ENTMCNC: 31.8 GM/DL (ref 32.2–35.5)
MCV RBC AUTO: 102 FL (ref 81–99)
MONOCYTES ABSOLUTE: 1.1 THOU/MM3 (ref 0.4–1.3)
MONOCYTES NFR BLD AUTO: 39 % (ref 0–12)
NEUTROPHILS ABSOLUTE: 0.9 THOU/MM3 (ref 1.8–7.7)
NEUTROPHILS NFR BLD AUTO: 31 % (ref 43–75)
PLATELET # BLD AUTO: 84 THOU/MM3 (ref 130–400)
PMV BLD AUTO: 11.5 FL (ref 9.4–12.4)
RBC # BLD AUTO: 3.42 MILL/MM3 (ref 4.2–5.4)
WBC # BLD AUTO: 2.8 THOU/MM3 (ref 4.8–10.8)

## 2024-06-03 PROCEDURE — 99214 OFFICE O/P EST MOD 30 MIN: CPT | Performed by: PHYSICIAN ASSISTANT

## 2024-06-03 PROCEDURE — 1123F ACP DISCUSS/DSCN MKR DOCD: CPT | Performed by: PHYSICIAN ASSISTANT

## 2024-06-03 PROCEDURE — 85025 COMPLETE CBC W/AUTO DIFF WBC: CPT

## 2024-06-03 PROCEDURE — 36415 COLL VENOUS BLD VENIPUNCTURE: CPT

## 2024-06-03 PROCEDURE — 99211 OFF/OP EST MAY X REQ PHY/QHP: CPT

## 2024-06-03 NOTE — PROGRESS NOTES
- This staff was referred to Gisela through the Financial Navigator to assist her in any available resources  - Gisela has been referred to obtain a bone marrow biopsy from her provider.   - Gisela expressed concerns of the financial burden this could place on her therefore questioned her ability to obtain the recommended testing. This staff called Gisela as a follow-up to review the financial assistance service through the hospital. Unfortunately the conversation ended up on a voicemail left for Gisela, explaining the assistance application process.   - Gisela was provided my direct phone number to reach out for further assistance.

## 2024-06-03 NOTE — PATIENT INSTRUCTIONS
Return to clinic in 6 weeks   CBC on RTC  Please have Kinsey contact patient regarding financial assistance. Patient considering bone marrow biopsy but would like to discuss financial assistance   Please call for questions or concerns.

## 2024-06-03 NOTE — PROGRESS NOTES
[facility] [address], [address]  [facility] [address]  Dept: [phone]  Dept Fax: [phone] Loc: [phone]      Visit Date:6/3/2024     Gisela Richmond is a 69 y.o. female who presents today for:   Chief Complaint   Patient presents with    Follow-up     Iron deficiency anemia, unspecified iron deficiency anemia type        HPI:   Gisela Richmond is a 69 y.o. female referred to Hematology/Oncology clinic for evaluation of thrombocytopenia per her PCP, GABRIELLE Vázquez. She was seen as a new patient on 4/15/24. She recently had routine lab work completed on 3/25/2024. CBC showed WBC count 2.8, Hgb 11.0, Hct 33.7, MCV 99, platelet count 76,000. She was referred for further evaluation. BMP same date showed Cr 0.78, calcium 9.1 within normal limits. Total protein 6.5 and LFT within normal limits.    The patient states she has been feeling well overall.  She affirms recent GI viral illness.  She states this occurred several weeks before having recent labs.  She denies unintentional weight loss, poor appetite, early satiety, persistent fever, recurrent infections, night sweats, or lymphadenopathy.  She denies history of autoimmune disease or liver disease. She states \"I don't eat as good now\". Denies chest pain, SOB, abdominal pain, nausea, vomiting, bowel changes. Denies any abnormal bleeding. Per review of EMR, she had colonoscopy on 8/7/23 per Dr. Piña showing no polyps seen, some residual liquid stool limiting visibility, hemorrhiods. She was recommended 10 year follow up.   PMH includes history of AVR, COPD. She affirms drinking approximately 3 beers per day. She is a not a current smoker; quit at age 66 smoked 6 years 1 ppd.        Interval History 6/3/2024:   The patient is here for follow up evaluation. She was seen as a new patient on 4/15/24 and had follow up on 5/6/24. She was recommend bone marrow biopsy at that time and patient declined scheduling at that

## 2024-06-04 ENCOUNTER — SOCIAL WORK (OUTPATIENT)
Dept: INFUSION THERAPY | Age: 69
End: 2024-06-04

## 2024-06-04 NOTE — PROGRESS NOTES
- Gisela called this morning requesting the Financial Navigator (Kinsey) follow up with her. Apparently she was referred to Kinsey by Ashlee ZELAYA during her previous encounter.   - Gisela didn't go into a lot of detail regarding the need to reach out to Kinsey therefore an email with her information was sent to Kinsey this morning. Not provided the patient name,  and phone number, requesting she follow up with her after 3:30p per patient. The patient works in the day and is not able to call otherwise.   - This staff will remain available for support.     24 - 4:03p - Spoke with Gisela and explained the critical information that is needed for her Provider (Ashlee ZELAYA) to continue offering her optimal treatment. It was recommended by Ashlee that Gisela have a bone marrow biopsy. Gisela was reluctant due to the financial strain this could place on her.  - This staff walked her through the Financial Services and how determination and payments plans are organized to assist in patient care.   - She is willing to have the test now and would like Ashlee to contact her (after 3:30p) to coordinate how to arrange the test scheduling.   - This staff will remain available for support as needed.

## 2024-06-10 ENCOUNTER — TELEPHONE (OUTPATIENT)
Dept: ONCOLOGY | Age: 69
End: 2024-06-10

## 2024-06-10 NOTE — TELEPHONE ENCOUNTER
Notified by KENDRICK Mcallister that patient was interested in discussing bone marrow biopsy. Attempted to call patient, no answer. Left voicemail to call back.

## 2024-07-12 DIAGNOSIS — D72.819 LEUKOPENIA, UNSPECIFIED TYPE: ICD-10-CM

## 2024-07-12 DIAGNOSIS — D64.9 NORMOCYTIC ANEMIA: ICD-10-CM

## 2024-07-12 DIAGNOSIS — D69.6 THROMBOCYTOPENIA (HCC): ICD-10-CM

## 2024-07-12 DIAGNOSIS — D61.818 PANCYTOPENIA (HCC): Primary | ICD-10-CM

## 2024-07-15 ENCOUNTER — OFFICE VISIT (OUTPATIENT)
Dept: ONCOLOGY | Age: 69
End: 2024-07-15
Payer: MEDICARE

## 2024-07-15 ENCOUNTER — HOSPITAL ENCOUNTER (OUTPATIENT)
Dept: INFUSION THERAPY | Age: 69
Discharge: HOME | End: 2024-07-15
Payer: MEDICARE

## 2024-07-15 VITALS
TEMPERATURE: 97.8 F | DIASTOLIC BLOOD PRESSURE: 69 MMHG | HEIGHT: 64 IN | OXYGEN SATURATION: 94 % | HEART RATE: 69 BPM | WEIGHT: 111 LBS | RESPIRATION RATE: 16 BRPM | BODY MASS INDEX: 18.95 KG/M2 | SYSTOLIC BLOOD PRESSURE: 141 MMHG

## 2024-07-15 VITALS
TEMPERATURE: 97.8 F | OXYGEN SATURATION: 94 % | SYSTOLIC BLOOD PRESSURE: 141 MMHG | HEART RATE: 69 BPM | DIASTOLIC BLOOD PRESSURE: 69 MMHG | RESPIRATION RATE: 16 BRPM

## 2024-07-15 DIAGNOSIS — D61.818 PANCYTOPENIA (HCC): Primary | ICD-10-CM

## 2024-07-15 DIAGNOSIS — D61.818 PANCYTOPENIA (HCC): ICD-10-CM

## 2024-07-15 DIAGNOSIS — D72.819 LEUKOPENIA, UNSPECIFIED TYPE: ICD-10-CM

## 2024-07-15 DIAGNOSIS — D64.9 NORMOCYTIC ANEMIA: ICD-10-CM

## 2024-07-15 DIAGNOSIS — D69.6 THROMBOCYTOPENIA (HCC): ICD-10-CM

## 2024-07-15 LAB
BASOPHILS ABSOLUTE: 0 THOU/MM3 (ref 0–0.1)
BASOPHILS NFR BLD AUTO: 1 % (ref 0–3)
EOSINOPHIL NFR BLD AUTO: 1 % (ref 0–4)
EOSINOPHILS ABSOLUTE: 0 THOU/MM3 (ref 0–0.4)
ERYTHROCYTE [DISTWIDTH] IN BLOOD BY AUTOMATED COUNT: 12.8 % (ref 11.5–14.5)
HCT VFR BLD AUTO: 32.8 % (ref 37–47)
HGB BLD-MCNC: 10.4 GM/DL (ref 12–16)
IMMATURE GRANULOCYTES %: 0 %
IMMATURE GRANULOCYTES ABSOLUTE: 0.01 THOU/MM3 (ref 0–0.07)
LYMPHOCYTES ABSOLUTE: 0.6 THOU/MM3 (ref 1–4.8)
LYMPHOCYTES NFR BLD AUTO: 26 % (ref 15–47)
MCH RBC QN AUTO: 32.7 PG (ref 26–33)
MCHC RBC AUTO-ENTMCNC: 31.7 GM/DL (ref 32.2–35.5)
MCV RBC AUTO: 103 FL (ref 81–99)
MONOCYTES ABSOLUTE: 0.9 THOU/MM3 (ref 0.4–1.3)
MONOCYTES NFR BLD AUTO: 38 % (ref 0–12)
NEUTROPHILS ABSOLUTE: 0.8 THOU/MM3 (ref 1.8–7.7)
NEUTROPHILS NFR BLD AUTO: 34 % (ref 43–75)
PLATELET # BLD AUTO: 81 THOU/MM3 (ref 130–400)
PMV BLD AUTO: 11.6 FL (ref 9.4–12.4)
RBC # BLD AUTO: 3.18 MILL/MM3 (ref 4.2–5.4)
WBC # BLD AUTO: 2.4 THOU/MM3 (ref 4.8–10.8)

## 2024-07-15 PROCEDURE — 99211 OFF/OP EST MAY X REQ PHY/QHP: CPT

## 2024-07-15 PROCEDURE — 1123F ACP DISCUSS/DSCN MKR DOCD: CPT | Performed by: PHYSICIAN ASSISTANT

## 2024-07-15 PROCEDURE — 36415 COLL VENOUS BLD VENIPUNCTURE: CPT

## 2024-07-15 PROCEDURE — 99214 OFFICE O/P EST MOD 30 MIN: CPT | Performed by: PHYSICIAN ASSISTANT

## 2024-07-15 PROCEDURE — 85025 COMPLETE CBC W/AUTO DIFF WBC: CPT

## 2024-07-15 NOTE — PROGRESS NOTES
EMR she has had chronic leukopenia since 2021 with WBC count ranging 2-3's. Chronic anemia since 2012 per EMR with baseline Hgb 9-10's. She required blood transfusion following AVR in 2021. Chronic thrombocytopenia since 2021 with platelet count ranging 52,000 to 230,000. The following was obtained:  - CBC on 4/15/24: WBC count 3.0, Hgb 10.4, Hct 33.2, , platelet count 78,000.  - Ferritin 98, iron 78, TIBC 247 within normal limits  - Vitamin B12 & Folate - folate >20, vitamin B12 1327. Not deficient. On MVI.  - TSH with Reflex - within normal limits  - Reticulocytes - ab 2.6%.   - Leukemia / Lymphoma Phenotype - relative increase in mature monocytes without immunophenotypic aberrancy accounting for 47% of viable leukocytes and decreased granulocytes identified.  These findings are nonspecific and may be observed in reactive setting as well as myeloid neoplasms.    Reviewed with patient recommendation for bone marrow biopsy.  This was discussed at prior visits and today. Discussed due to persistent pancytopenia with abnormal flow cytometry bone marrow biopsy is recommended to evaluate potential causes.  Reviewed procedure of bone marrow biopsy.  The patient states she is hesitant to proceed at this time due to financial constraints.  contacted patient at last visit and discussed financial recommendations with the patient. Reviewed recommendation for bone marrow biopsy today. Patient is agreeable to schedule today.     Today on 7/15/24: WBC count 2.4 , Hgb 10.4, Hct 32.8, , platelet count 81,000.      -Will continue to monitor blood counts   -IR bone marrow biopsy ordered   -Return to clinic 2 weeks after completion of bone marrow biopsy   -Labs on RTC      All patient questions answered. Pt voiced understanding. Patient agreed with treatment plan. Follow up as directed. Patient instructed to call for questions or concerns.        Electronically signed by   Ashlee Moreau PA-C

## 2024-07-15 NOTE — PATIENT INSTRUCTIONS
Will obtain IR bone marrow biopsy   Return to clinic with available MD after bone marrow biopsy completed  Labs on RTC

## 2024-07-30 ENCOUNTER — HOSPITAL ENCOUNTER (OUTPATIENT)
Dept: CT IMAGING | Age: 69
Discharge: HOME | End: 2024-07-30
Payer: MEDICARE

## 2024-07-30 VITALS
SYSTOLIC BLOOD PRESSURE: 98 MMHG | OXYGEN SATURATION: 98 % | BODY MASS INDEX: 18.88 KG/M2 | RESPIRATION RATE: 18 BRPM | TEMPERATURE: 96.7 F | DIASTOLIC BLOOD PRESSURE: 55 MMHG | WEIGHT: 110 LBS | HEART RATE: 87 BPM

## 2024-07-30 DIAGNOSIS — D61.818 PANCYTOPENIA (HCC): ICD-10-CM

## 2024-07-30 DIAGNOSIS — D69.6 THROMBOCYTOPENIA (HCC): ICD-10-CM

## 2024-07-30 DIAGNOSIS — D72.819 LEUKOPENIA, UNSPECIFIED TYPE: ICD-10-CM

## 2024-07-30 DIAGNOSIS — D64.9 NORMOCYTIC ANEMIA: ICD-10-CM

## 2024-07-30 LAB
BASOPHILS ABSOLUTE: 0 THOU/MM3 (ref 0–0.1)
BASOPHILS NFR BLD AUTO: 0.7 %
DEPRECATED RDW RBC AUTO: 49.2 FL (ref 35–45)
EOSINOPHIL NFR BLD AUTO: 1.5 %
EOSINOPHILS ABSOLUTE: 0 THOU/MM3 (ref 0–0.4)
ERYTHROCYTE [DISTWIDTH] IN BLOOD BY AUTOMATED COUNT: 13 % (ref 11.5–14.5)
HCT VFR BLD AUTO: 33.7 % (ref 37–47)
HGB BLD-MCNC: 10.6 GM/DL (ref 12–16)
IMM GRANULOCYTES # BLD AUTO: 0 THOU/MM3 (ref 0–0.07)
IMM GRANULOCYTES NFR BLD AUTO: 0 %
LYMPHOCYTES ABSOLUTE: 0.8 THOU/MM3 (ref 1–4.8)
LYMPHOCYTES NFR BLD AUTO: 30.1 %
MCH RBC QN AUTO: 32.8 PG (ref 26–33)
MCHC RBC AUTO-ENTMCNC: 31.5 GM/DL (ref 32.2–35.5)
MCV RBC AUTO: 104.3 FL (ref 81–99)
MONOCYTES ABSOLUTE: 1.1 THOU/MM3 (ref 0.4–1.3)
MONOCYTES NFR BLD AUTO: 39.3 %
NEUTROPHILS ABSOLUTE: 0.8 THOU/MM3 (ref 1.8–7.7)
NEUTROPHILS NFR BLD AUTO: 28.4 %
NRBC BLD AUTO-RTO: 0 /100 WBC
PLATELET # BLD AUTO: 81 THOU/MM3 (ref 130–400)
PLATELET BLD QL SMEAR: ABNORMAL
PMV BLD AUTO: 12 FL (ref 9.4–12.4)
RBC # BLD AUTO: 3.23 MILL/MM3 (ref 4.2–5.4)
SCAN OF BLOOD SMEAR: NORMAL
VARIANT LYMPHS BLD QL SMEAR: ABNORMAL %
WBC # BLD AUTO: 2.7 THOU/MM3 (ref 4.8–10.8)

## 2024-07-30 PROCEDURE — 2580000003 HC RX 258: Performed by: RADIOLOGY

## 2024-07-30 PROCEDURE — 6360000002 HC RX W HCPCS: Performed by: RADIOLOGY

## 2024-07-30 PROCEDURE — 77012 CT SCAN FOR NEEDLE BIOPSY: CPT

## 2024-07-30 RX ORDER — SODIUM CHLORIDE 450 MG/100ML
INJECTION, SOLUTION INTRAVENOUS CONTINUOUS
Status: DISCONTINUED | OUTPATIENT
Start: 2024-07-30 | End: 2024-07-31 | Stop reason: HOSPADM

## 2024-07-30 RX ORDER — FENTANYL CITRATE 50 UG/ML
50 INJECTION, SOLUTION INTRAMUSCULAR; INTRAVENOUS ONCE
Status: COMPLETED | OUTPATIENT
Start: 2024-07-30 | End: 2024-07-30

## 2024-07-30 RX ORDER — MIDAZOLAM HYDROCHLORIDE 1 MG/ML
1 INJECTION INTRAMUSCULAR; INTRAVENOUS ONCE
Status: COMPLETED | OUTPATIENT
Start: 2024-07-30 | End: 2024-07-30

## 2024-07-30 RX ADMIN — FENTANYL CITRATE 50 MCG: 50 INJECTION INTRAMUSCULAR; INTRAVENOUS at 08:49

## 2024-07-30 RX ADMIN — SODIUM CHLORIDE: 4.5 INJECTION, SOLUTION INTRAVENOUS at 07:12

## 2024-07-30 RX ADMIN — MIDAZOLAM 1 MG: 1 INJECTION INTRAMUSCULAR; INTRAVENOUS at 08:49

## 2024-07-30 NOTE — POST SEDATION
[facility]  Sedation/Analgesia Post Sedation Record    Pt Name: Gisela Richmond  MRN: [id number]  Date of Birth: @birthdate@  Procedure Performed By: Yvrose Sawyer MD, MD  Primary Care Physician: Cecil Roues APRN - TEET[place]    POST-PROCEDURE    Physicians/Assistants: Yvrose Sawyer MD, MD  Procedure Performed: bone marrow bx, L PSIS    Sedation/Anesthesia: Conscious Sedation with Fentanyl & Versed   Estimated Blood Loss:          3 ml  Specimens Removed:  []None [x]Other: 12 ml aspirate, 1 core sample     Disposition of Specimen:  [x]Pathology []Other      Complications:   [x]None Immediate []Other:       Post-procedure Diagnosis/Findings:      No bleeding at site.         Recommendations:        Follow post-procedure orders.       Electronically signed by Yvrose Sawyer MD on 7/30/2024 at 8:59 AM

## 2024-07-30 NOTE — PROGRESS NOTES
0830 Histology arrived to CT. Dr. Sawyer here; spoke to patient. This procedure has been fully reviewed with the patient and written informed consent has been obtained.   0840 Patient assisted to CT table and pre scan started.   0847 Procedure started with Dr. Sawyer.  0853 Samples collected and given to histologist for further review.   0855 Procedure completed; patient tolerated well. Post scan obtained.   0857 Bacitracin oint, band aid to site; no bleeding noted.  0903 Patient on cart; comfort ensured.  0905 Report called to OPN and patient taken to OPN via cart. Pt alert and oriented x3; follows commands. Skin pink, warm, and dry. Respirations easy, regular, and nonlabored.

## 2024-07-30 NOTE — PROGRESS NOTES
0812 Patient received in CT scanning for bone marrow biopsy pre-procedure assessment with family at bedside. Monitor applied.   2922 Report given to Antony SORIA.

## 2024-07-30 NOTE — PRE SEDATION
[facility]  Sedation/Analgesia History & Physical    Pt Name: Gisela Richmond  MRN: [id number]  YOB: 1955  Provider Performing Procedure: Yvrose Sawyer MD, MD  Primary Care Physician: Cecil Rouse APRN - CNP    Formulation and discussion of sedation / procedure plans, risks, benefits, side effects and alternatives with patient and/or responsible adult completed.    PRE-PROCEDURE   DNR-CCA/DNR-CC []Yes [x]No  Brief History/Pre-Procedure Diagnosis: low platelets, 81 today          MEDICAL HISTORY  []CAD/Valve  []Liver Disease  [x]Lung Disease []Diabetes  []Hypertension []Renal Disease  []Additional information:       has a past medical history of Anemia, COPD (chronic obstructive pulmonary disease) (HCC), Family history of breast cancer in sister, and Valvular heart disease.    SURGICAL HISTORY   has a past surgical history that includes Total hip arthroplasty (2012); hip surgery (Right, 2012); fracture surgery (Left, 2020); Aortic valve replacement (N/A, 11/19/2021); and Colonoscopy (N/A, 8/7/2023).  Additional information:       ALLERGIES   Allergies as of 07/30/2024 - Fully Reviewed 07/30/2024   Allergen Reaction Noted    Augmentin [amoxicillin-pot clavulanate] Other (See Comments) 03/09/2023     Additional information:       MEDICATIONS   Coumadin Use Last 5 Days [x]No []Yes  Antiplatelet drug therapy use last 5 days  [x]No []Yes  Other anticoagulant use last 5 days  [x]No []Yes    Current Outpatient Medications:     metoprolol tartrate (LOPRESSOR) 25 MG tablet, Take 1 tablet by mouth twice daily, Disp: 180 tablet, Rfl: 2    aspirin 81 MG EC tablet, Take 1 tablet by mouth daily, Disp: , Rfl:     calcium carbonate (OSCAL) 500 MG TABS tablet, Take 1 tablet by mouth daily, Disp: , Rfl:     ferrous sulfate (IRON 325) 325 (65 Fe) MG tablet, Take 1 tablet by mouth Daily with lunch, Disp: , Rfl:     Multiple Vitamins-Minerals (PRESERVISION AREDS PO), Take 2 tablets by mouth daily,

## 2024-07-30 NOTE — DISCHARGE INSTRUCTIONS
POST BIOPSY DISCHARGE INSTRUCTION SHEET    DIET:  As tolerated    ACTIVITY:  Rest at home on sofa, bed or recliner today.  Bathroom privileges only today.  Limit any exertion (pushing or pulling) today.  No lifting for 3 days.  No driving today.  Check biopsy site frequently today.  Resume any blood thinners in 24 hours.  Keep band aid clean & dry - replace as needed, may remove in 48 hours.    RETURN TO NEAREST EMERGENCY ROOM IF YOU HAVE ANY OF THE FOLLOWING:  Sign of bleeding, swelling, drainage from biopsy site or severe pain (slight discomfort to be expected) around biopsy site.  Repeated nausea/vomiting/abdominal pain.  Elevated temperature above 101 degrees.  Shortness of breath.  Chest pain.    Keep scheduled appointment with your physician.  If sedation given, follow post sedation instruction sheet.      _      SEDATION/ANALGESIA INFORMATION HOME GOING ADVICE    Review the following information with the patient prior to the procedure.  Sedation/agalgesia is used during short medical procedures under controlled supervision.  The medication will produce a strong relaxation.  You will be able to hear, speak and follow instructions, but you memory and alertness will be decreased.  You will be able to swallow and breathe on your own.  During sedation/analgesia you blood pressure, hear and breathing will be watched closely.  After the procedure, you may not remember what was said or done.    Procedure:      Date:  You may have the following effects from the medication.  Drowsiness, dizziness, sleepiness or confusion.  Difficulty remembering or delayed reaction times.  Loss of fine muscle control or difficulty with your balance especially while walking.  Difficulty focusing or blurred vision.  You may not be aware of slight changes in your behavior and/or your reaction time because of the medication used during the procedure.  Therefore you should follow these instructions.  Have someone responsible help you with

## 2024-07-30 NOTE — PROGRESS NOTES
0700 Patient ambulatory to [facility] for Bone Marrow biopsy. Patient confirms NPO,  for discharge. Patient states she did take her 81mg Aspirin this am. She states she was told by  she is allowed to take it. PT RIGHTS AND RESPONSIBILITIES OFFERED TO PT.    0754 Patient to Radiology for procedure.     0913 Patient back to room post procedure. Denies any new pain. Band aid to lower back dry and intact.     0930 Patient resting in bed. Denies any pain. Band aid to lower back dry and intact.     0945 Patient resting in bed. Denies any pain. Band aid to lower back dry and intact.       1030 Patient resting in bed. Denies any complaints. Band aid to lower back dry and intact.     1100 Patient denies any issues. Band aid to lower back dry and intact. AVS reviewed with patient and sister. Verbalizes understanding. Patient discharged in wheelchair to lobby.         _M___ Safety:       (Environmental)  [place] to environment  Ensure ID band is correct and in place/ allergy band as needed  Assess for fall risk  Initiate fall precautions as applicable (fall band, side rails, etc.)  Call light within reach  Bed in low position/ wheels locked    _M___ Pain:       Assess pain level and characteristics  Administer analgesics as ordered  Assess effectiveness of pain management and report to MD as needed    _M___ Knowledge Deficit:  Assess baseline knowledge  Provide teaching at level of understanding  Provide teaching via preferred learning method  Evaluate teaching effectiveness    _M___ Hemodynamic/Respiratory Status:       (Pre and Post Procedure Monitoring)  Assess/Monitor vital signs and LOC  Assess Baseline SpO2 prior to any sedation  Obtain weight/height  Assess vital signs/ LOC until patient meets discharge criteria  Monitor procedure site and notify MD of any issues

## 2024-07-31 ENCOUNTER — TELEPHONE (OUTPATIENT)
Dept: ONCOLOGY | Age: 69
End: 2024-07-31

## 2024-07-31 NOTE — TELEPHONE ENCOUNTER
----- Message from Ashlee Moreau PA-C sent at 7/30/2024 10:55 AM EDT -----  Good morning,    This patient had bone marrow biopsy completed today. She is scheduled to be seen on 8/13/24 with Dr. Momin. Can we move this appointment up to end of next week. It looks like there are multiple openings on 8/9/24 with Dr. Awada or Dr. Garg. Do you know if this patient requested Dr. Momin?   Also, her appointment note says \"6 week follow up\". Please add the appointment is to review bone marrow biopsy.     Please let me know if you have any questions.   Thanks! Ashlee  ----- Message -----  From: Angy Gomez Incoming Orders Results To [facility]  Sent: 7/30/2024  10:52 AM EDT  To: Ashlee Moreau PA-C

## 2024-07-31 NOTE — TELEPHONE ENCOUNTER
I called and left voicemail for patient to return call to office. Advised her appointment could be moved up and she could get results sooner if she would like.

## 2024-08-02 ENCOUNTER — TELEPHONE (OUTPATIENT)
Dept: ONCOLOGY | Age: 69
End: 2024-08-02

## 2024-08-02 LAB — LEUKEMIA/LYMPHOMA PHENO BONE MARROW: NORMAL

## 2024-08-02 NOTE — TELEPHONE ENCOUNTER
I CALLED PATIENT TO NOTIFY HER WE ARE GOING TO BE MOVING HER APPT UP D/T NEEDING TO RV HER BONE MARROW BIOPSY. SHE DIDN'T ANSWER SO I LEFT A MESSAGE ON HER PHONE ABOUT APPT DETAILS AND REASONING.

## 2024-08-08 LAB — CHROMOSOME, BONE MARROW: NORMAL

## 2024-08-09 ENCOUNTER — OFFICE VISIT (OUTPATIENT)
Dept: ONCOLOGY | Age: 69
End: 2024-08-09
Payer: MEDICARE

## 2024-08-09 ENCOUNTER — HOSPITAL ENCOUNTER (OUTPATIENT)
Dept: INFUSION THERAPY | Age: 69
Discharge: HOME | End: 2024-08-09
Payer: MEDICARE

## 2024-08-09 VITALS
DIASTOLIC BLOOD PRESSURE: 76 MMHG | SYSTOLIC BLOOD PRESSURE: 124 MMHG | RESPIRATION RATE: 16 BRPM | OXYGEN SATURATION: 99 % | HEART RATE: 76 BPM

## 2024-08-09 VITALS
RESPIRATION RATE: 16 BRPM | BODY MASS INDEX: 18.78 KG/M2 | SYSTOLIC BLOOD PRESSURE: 124 MMHG | DIASTOLIC BLOOD PRESSURE: 76 MMHG | WEIGHT: 110 LBS | OXYGEN SATURATION: 99 % | HEART RATE: 76 BPM | HEIGHT: 64 IN

## 2024-08-09 DIAGNOSIS — D61.818 PANCYTOPENIA (HCC): Primary | ICD-10-CM

## 2024-08-09 DIAGNOSIS — D69.6 THROMBOCYTOPENIA (HCC): ICD-10-CM

## 2024-08-09 DIAGNOSIS — D72.819 LEUKOPENIA, UNSPECIFIED TYPE: ICD-10-CM

## 2024-08-09 DIAGNOSIS — D61.818 PANCYTOPENIA (HCC): ICD-10-CM

## 2024-08-09 DIAGNOSIS — D64.9 NORMOCYTIC ANEMIA: ICD-10-CM

## 2024-08-09 LAB
BASOPHILS ABSOLUTE: 0 THOU/MM3 (ref 0–0.1)
BASOPHILS NFR BLD AUTO: 1 % (ref 0–3)
EOSINOPHIL NFR BLD AUTO: 1 % (ref 0–4)
EOSINOPHILS ABSOLUTE: 0 THOU/MM3 (ref 0–0.4)
ERYTHROCYTE [DISTWIDTH] IN BLOOD BY AUTOMATED COUNT: 12.6 % (ref 11.5–14.5)
HCT VFR BLD AUTO: 33 % (ref 37–47)
HGB BLD-MCNC: 10.5 GM/DL (ref 12–16)
IMMATURE GRANULOCYTES %: 0 %
IMMATURE GRANULOCYTES ABSOLUTE: 0 THOU/MM3 (ref 0–0.07)
LYMPHOCYTES ABSOLUTE: 0.8 THOU/MM3 (ref 1–4.8)
LYMPHOCYTES NFR BLD AUTO: 30 % (ref 15–47)
MCH RBC QN AUTO: 32.7 PG (ref 26–33)
MCHC RBC AUTO-ENTMCNC: 31.8 GM/DL (ref 32.2–35.5)
MCV RBC AUTO: 103 FL (ref 81–99)
MISC. #1 REFERENCE GROUP TEST: NORMAL
MONOCYTES ABSOLUTE: 1 THOU/MM3 (ref 0.4–1.3)
MONOCYTES NFR BLD AUTO: 34 % (ref 0–12)
NEUTROPHILS ABSOLUTE: 1 THOU/MM3 (ref 1.8–7.7)
NEUTROPHILS NFR BLD AUTO: 35 % (ref 43–75)
PLATELET # BLD AUTO: 78 THOU/MM3 (ref 130–400)
PMV BLD AUTO: 12 FL (ref 9.4–12.4)
RBC # BLD AUTO: 3.21 MILL/MM3 (ref 4.2–5.4)
RHEUMATOID FACT SERPL-ACNC: 10 IU/ML (ref 0–13)
WBC # BLD AUTO: 2.8 THOU/MM3 (ref 4.8–10.8)

## 2024-08-09 PROCEDURE — 85025 COMPLETE CBC W/AUTO DIFF WBC: CPT

## 2024-08-09 PROCEDURE — 36415 COLL VENOUS BLD VENIPUNCTURE: CPT

## 2024-08-09 PROCEDURE — 81206 BCR/ABL1 GENE MAJOR BP: CPT

## 2024-08-09 PROCEDURE — 99214 OFFICE O/P EST MOD 30 MIN: CPT | Performed by: INTERNAL MEDICINE

## 2024-08-09 PROCEDURE — 86430 RHEUMATOID FACTOR TEST QUAL: CPT

## 2024-08-09 PROCEDURE — 81207 BCR/ABL1 GENE MINOR BP: CPT

## 2024-08-09 PROCEDURE — 1123F ACP DISCUSS/DSCN MKR DOCD: CPT | Performed by: INTERNAL MEDICINE

## 2024-08-09 PROCEDURE — 81208 BCR/ABL1 GENE OTHER BP: CPT

## 2024-08-09 PROCEDURE — 99211 OFF/OP EST MAY X REQ PHY/QHP: CPT

## 2024-08-09 PROCEDURE — 82525 ASSAY OF COPPER: CPT

## 2024-08-09 PROCEDURE — 86038 ANTINUCLEAR ANTIBODIES: CPT

## 2024-08-09 PROCEDURE — 84630 ASSAY OF ZINC: CPT

## 2024-08-09 NOTE — PROGRESS NOTES
BMP same date showed Cr 0.78, calcium 9.1 within normal limits. Total protein 6.5 and LFT within normal limits.      -Per review of EMR she has had chronic leukopenia since 2021 with WBC count ranging 2-3's. Chronic anemia since 2012 per EMR with baseline Hgb 9-10's.   -She required blood transfusion following AVR in 2021.   -Chronic thrombocytopenia since 2021 with platelet count ranging 52,000 to 230,000. The following was obtained:  - CBC on 4/15/24: WBC count 3.0, Hgb 10.4, Hct 33.2, , platelet count 78,000.  - Ferritin 98, iron 78, TIBC 247 within normal limits  - Vitamin B12 & Folate - folate >20, vitamin B12 1327. Not deficient. On MVI.  - TSH with Reflex - within normal limits  - Reticulocytes - ab 2.6%.   - Leukemia / Lymphoma Phenotype - relative increase in mature monocytes without immunophenotypic aberrancy accounting for 47% of viable leukocytes and decreased granulocytes identified.  These findings are nonspecific and may be observed in reactive setting as well as myeloid neoplasms.    7/15/24:   -CBC showed WBC count 2.4 , Hgb 10.4, Hct 32.8, , platelet count 81,000.     -Reviewed with patient recommendation for bone marrow biopsy. Discussed due to persistent pancytopenia with abnormal flow cytometry bone marrow biopsy is recommended to evaluate potential causes.  Reviewed procedure of bone marrow biopsy.  The patient states she is hesitant to proceed at this time due to financial constraints.  contacted patient at last visit and discussed financial recommendations with the patient. Reviewed recommendation for bone marrow biopsy today. Patient is agreeable to schedule today.     Interim results (7/30/24):   -Bone marrow biopsy showed:   #Mildly hypercellular bone marrow (50%) with a left shifted granulocytic hyperplasia and orderly maturation.   #Lymphoid aggregates are present, favor reactive.   #Peripheral blood: Mild pancytopenia with macrocytic anemia and

## 2024-08-11 LAB — NUCLEAR IGG SER QL IA: NORMAL

## 2024-08-12 LAB
COPPER SERPL-MCNC: 108.1 UG/DL (ref 80–155)
ZINC SERPL-MCNC: 79.8 UG/DL (ref 60–120)

## 2024-08-16 LAB
SPECIMEN SOURCE: NORMAL
T(9;22)(ABL1,BCR) BLD/T QL: NOT DETECTED

## 2024-08-23 ENCOUNTER — HOSPITAL ENCOUNTER (OUTPATIENT)
Dept: INFUSION THERAPY | Age: 69
Discharge: HOME | End: 2024-08-23

## 2024-08-23 ENCOUNTER — SCHEDULED TELEPHONE ENCOUNTER (OUTPATIENT)
Dept: ONCOLOGY | Age: 69
End: 2024-08-23

## 2024-08-23 DIAGNOSIS — D75.9 CLONAL CYTOPENIA OF UNDETERMINED SIGNIFICANCE (CCUS): Primary | ICD-10-CM

## 2024-08-23 NOTE — PATIENT INSTRUCTIONS
-Referral to [facility] was placed.  -Schedule follow-up in 4 weeks (after [facility] evaluation) for evaluation of blood work.  
FAMILY HISTORY:  No pertinent family history in first degree relatives

## 2024-08-23 NOTE — PROGRESS NOTES
[facility] [address], [address]  [facility] [address]  Dept: [phone]  Dept Fax: [phone] Loc: [phone]      Visit Date:8/23/2024     Gisela Richmond is a 69 y.o. female who presents today for:   No chief complaint on file.       HPI:   Gisela Richmond is a 69 y.o. female referred to Hematology/Oncology clinic for evaluation of thrombocytopenia per her PCP, GABRIELLE Vázquez.     She had routine lab work completed on 3/25/2024. CBC showed WBC count 2.8, Hgb 11.0, Hct 33.7, MCV 99, platelet count 76,000. BMP same date showed Cr 0.78, calcium 9.1 within normal limits. Total protein 6.5 and LFT within normal limits. She was referred for further evaluation.     She was seen as a new patient on 4/15/24.     The patient states she has been feeling well overall.  She affirms recent GI viral illness.  She stated this occurred several weeks before having recent labs.  She deniedunintentional weight loss, poor appetite, early satiety, persistent fever, recurrent infections, night sweats, or lymphadenopathy.  She denied history of autoimmune disease or liver disease. She stated \"I don't eat as good now\". Denied chest pain, SOB, abdominal pain, nausea, vomiting, bowel changes. Denied any abnormal bleeding.     Per review of EMR, she had colonoscopy on 8/7/23 per Dr. Piña showing no polyps seen, some residual liquid stool limiting visibility, hemorrhiods. She was recommended 10 year follow up.     PMH includes history of AVR, COPD. She affirms drinking approximately 3 beers per day. She is a not a current smoker; quit at age 66 smoked 6 years 1 ppd.     Interval History   -6/3/24 - She was seen as a new patient on 4/15/24 and had follow up on 5/6/24. She was recommend bone marrow biopsy at that time and patient declined scheduling at that time with 4 week follow up. She states over the last 4 weeks she has been feeling well.    -7/15/24: The patient is here for follow up

## 2024-08-30 ENCOUNTER — SOCIAL WORK (OUTPATIENT)
Dept: INFUSION THERAPY | Age: 69
End: 2024-08-30

## 2024-08-30 NOTE — PROGRESS NOTES
Oncology Social Work    Date: 8/30/2024  Time: 10:16 AM  Name: Gisela Richmond  MRN: [id number]     Contact Type: Transportation Request    Note:   Situation: Gisela M Carlson called the Oncology Social Worker to assist with transportation to and from her [facility] appts.     Background:  Gisela Richmond is not able to provide transportation for herself and has asked the  for assistance.     Assessment: - Gisela Richmond requested transportation for her upcoming appointment located at [facility] [phone]  - Patient will be picked up by [employer] ( [employer]) at 12:00p and taken to/from her 2:40p appt (Arrival time is 2:10p).   - Patient has been notified of the arrangements provided.    Recommendation: Appt changes will be initiated by Gisela Richmond based on need.  provided Gisela Richmond with my contact information and will remain available for support.      KALPESH Alcaraz, KENDRICK, JULIANO  Oncology Social Worker      Electronically signed by KALPESH Alcaraz LSW, ACHP-SW on 8/30/2024 at 10:16 AM

## 2024-09-10 DIAGNOSIS — Z95.3 FOLLOW-UP VISIT FOR AORTIC VALVE REPLACEMENT WITH BIOPROSTHETIC VALVE: ICD-10-CM

## 2024-09-10 DIAGNOSIS — Z09 FOLLOW-UP VISIT FOR AORTIC VALVE REPLACEMENT WITH BIOPROSTHETIC VALVE: ICD-10-CM

## 2024-09-10 RX ORDER — METOPROLOL TARTRATE 25 MG/1
TABLET, FILM COATED ORAL
Qty: 180 TABLET | Refills: 0 | Status: SHIPPED | OUTPATIENT
Start: 2024-09-10

## 2024-09-16 ENCOUNTER — OFFICE VISIT (OUTPATIENT)
Dept: CARDIOLOGY CLINIC | Age: 69
End: 2024-09-16
Payer: MEDICARE

## 2024-09-16 VITALS
BODY MASS INDEX: 17.04 KG/M2 | DIASTOLIC BLOOD PRESSURE: 62 MMHG | HEIGHT: 66 IN | HEART RATE: 70 BPM | SYSTOLIC BLOOD PRESSURE: 117 MMHG | WEIGHT: 106 LBS

## 2024-09-16 DIAGNOSIS — Z95.2 S/P AVR: Primary | ICD-10-CM

## 2024-09-16 PROCEDURE — 99213 OFFICE O/P EST LOW 20 MIN: CPT | Performed by: INTERNAL MEDICINE

## 2024-09-16 PROCEDURE — 93000 ELECTROCARDIOGRAM COMPLETE: CPT | Performed by: INTERNAL MEDICINE

## 2024-09-16 PROCEDURE — 1123F ACP DISCUSS/DSCN MKR DOCD: CPT | Performed by: INTERNAL MEDICINE

## 2024-09-23 ENCOUNTER — OFFICE VISIT (OUTPATIENT)
Dept: FAMILY MEDICINE CLINIC | Age: 69
End: 2024-09-23
Payer: MEDICARE

## 2024-09-23 VITALS
OXYGEN SATURATION: 91 % | SYSTOLIC BLOOD PRESSURE: 102 MMHG | RESPIRATION RATE: 16 BRPM | BODY MASS INDEX: 17.75 KG/M2 | TEMPERATURE: 97.9 F | HEART RATE: 78 BPM | DIASTOLIC BLOOD PRESSURE: 52 MMHG | HEIGHT: 64 IN | WEIGHT: 104 LBS

## 2024-09-23 DIAGNOSIS — Z00.00 MEDICARE ANNUAL WELLNESS VISIT, SUBSEQUENT: Primary | ICD-10-CM

## 2024-09-23 PROCEDURE — G0439 PPPS, SUBSEQ VISIT: HCPCS | Performed by: NURSE PRACTITIONER

## 2024-09-23 PROCEDURE — 1123F ACP DISCUSS/DSCN MKR DOCD: CPT | Performed by: NURSE PRACTITIONER

## 2024-09-23 ASSESSMENT — PATIENT HEALTH QUESTIONNAIRE - PHQ9
SUM OF ALL RESPONSES TO PHQ QUESTIONS 1-9: 0
SUM OF ALL RESPONSES TO PHQ QUESTIONS 1-9: 0
1. LITTLE INTEREST OR PLEASURE IN DOING THINGS: NOT AT ALL
SUM OF ALL RESPONSES TO PHQ QUESTIONS 1-9: 0
2. FEELING DOWN, DEPRESSED OR HOPELESS: NOT AT ALL
SUM OF ALL RESPONSES TO PHQ9 QUESTIONS 1 & 2: 0
SUM OF ALL RESPONSES TO PHQ QUESTIONS 1-9: 0

## 2024-09-23 ASSESSMENT — LIFESTYLE VARIABLES
HOW OFTEN DO YOU HAVE A DRINK CONTAINING ALCOHOL: 2-3 TIMES A WEEK
HOW MANY STANDARD DRINKS CONTAINING ALCOHOL DO YOU HAVE ON A TYPICAL DAY: 3 OR 4

## 2024-10-17 ENCOUNTER — OFFICE VISIT (OUTPATIENT)
Dept: ONCOLOGY | Age: 69
End: 2024-10-17
Payer: MEDICARE

## 2024-10-17 ENCOUNTER — HOSPITAL ENCOUNTER (OUTPATIENT)
Dept: INFUSION THERAPY | Age: 69
Discharge: HOME | End: 2024-10-17
Payer: MEDICARE

## 2024-10-17 VITALS
RESPIRATION RATE: 16 BRPM | BODY MASS INDEX: 18.59 KG/M2 | WEIGHT: 106.6 LBS | HEART RATE: 77 BPM | OXYGEN SATURATION: 98 % | SYSTOLIC BLOOD PRESSURE: 155 MMHG | DIASTOLIC BLOOD PRESSURE: 67 MMHG | TEMPERATURE: 98 F

## 2024-10-17 VITALS
DIASTOLIC BLOOD PRESSURE: 67 MMHG | HEART RATE: 77 BPM | SYSTOLIC BLOOD PRESSURE: 155 MMHG | TEMPERATURE: 98 F | RESPIRATION RATE: 16 BRPM

## 2024-10-17 DIAGNOSIS — D75.9 CLONAL CYTOPENIA OF UNDETERMINED SIGNIFICANCE (CCUS): Primary | ICD-10-CM

## 2024-10-17 PROCEDURE — 99211 OFF/OP EST MAY X REQ PHY/QHP: CPT

## 2024-10-17 PROCEDURE — 1123F ACP DISCUSS/DSCN MKR DOCD: CPT | Performed by: INTERNAL MEDICINE

## 2024-10-17 PROCEDURE — 99214 OFFICE O/P EST MOD 30 MIN: CPT | Performed by: INTERNAL MEDICINE

## 2024-10-17 NOTE — PROGRESS NOTES
[facility] [address], [address]  [facility] [address]  Dept: [phone]  Dept Fax: [phone] Loc: [phone]      Visit Date:10/17/2024     Gisela Richmond is a 69 y.o. female who presents today for:   Chief Complaint   Patient presents with    Follow-up     Pancytopenia (HCC)        HPI:   Gisela Richmond is a 69 y.o. female referred to Hematology/Oncology clinic for evaluation of thrombocytopenia per her PCP, NATALY Vázquez-CNP.     She had routine lab work completed on 3/25/2024. CBC showed WBC count 2.8, Hgb 11.0, Hct 33.7, MCV 99, platelet count 76,000. BMP same date showed Cr 0.78, calcium 9.1 within normal limits. Total protein 6.5 and LFT within normal limits. She was referred for further evaluation.     She was seen as a new patient on 4/15/24.     The patient states she has been feeling well overall.  She affirms recent GI viral illness.  She stated this occurred several weeks before having recent labs.  She deniedunintentional weight loss, poor appetite, early satiety, persistent fever, recurrent infections, night sweats, or lymphadenopathy.  She denied history of autoimmune disease or liver disease. She stated \"I don't eat as good now\". Denied chest pain, SOB, abdominal pain, nausea, vomiting, bowel changes. Denied any abnormal bleeding.     Per review of EMR, she had colonoscopy on 8/7/23 per Dr. Piña showing no polyps seen, some residual liquid stool limiting visibility, hemorrhiods. She was recommended 10 year follow up.     PMH includes history of AVR, COPD. She affirms drinking approximately 3 beers per day. She is a not a current smoker; quit at age 66 smoked 6 years 1 ppd.     Interval History   -6/3/24 - She was seen as a new patient on 4/15/24 and had follow up on 5/6/24. She was recommend bone marrow biopsy at that time and patient declined scheduling at that time with 4 week follow up. She states over the last 4 weeks she has been feeling

## 2024-11-29 ENCOUNTER — OFFICE VISIT (OUTPATIENT)
Dept: FAMILY MEDICINE CLINIC | Age: 69
End: 2024-11-29
Payer: MEDICARE

## 2024-11-29 VITALS
WEIGHT: 105 LBS | DIASTOLIC BLOOD PRESSURE: 84 MMHG | SYSTOLIC BLOOD PRESSURE: 138 MMHG | RESPIRATION RATE: 14 BRPM | HEIGHT: 64 IN | BODY MASS INDEX: 17.93 KG/M2

## 2024-11-29 DIAGNOSIS — Z09 FOLLOW-UP VISIT FOR AORTIC VALVE REPLACEMENT WITH BIOPROSTHETIC VALVE: ICD-10-CM

## 2024-11-29 DIAGNOSIS — M25.551 CHRONIC RIGHT HIP PAIN: Primary | ICD-10-CM

## 2024-11-29 DIAGNOSIS — Z95.3 FOLLOW-UP VISIT FOR AORTIC VALVE REPLACEMENT WITH BIOPROSTHETIC VALVE: ICD-10-CM

## 2024-11-29 DIAGNOSIS — G89.29 CHRONIC RIGHT HIP PAIN: Primary | ICD-10-CM

## 2024-11-29 PROCEDURE — 99214 OFFICE O/P EST MOD 30 MIN: CPT | Performed by: NURSE PRACTITIONER

## 2024-11-29 PROCEDURE — 1159F MED LIST DOCD IN RCRD: CPT | Performed by: NURSE PRACTITIONER

## 2024-11-29 PROCEDURE — 1123F ACP DISCUSS/DSCN MKR DOCD: CPT | Performed by: NURSE PRACTITIONER

## 2024-11-29 RX ORDER — TRAMADOL HYDROCHLORIDE 50 MG/1
50 TABLET ORAL EVERY 6 HOURS PRN
Qty: 28 TABLET | Refills: 0 | Status: SHIPPED | OUTPATIENT
Start: 2024-11-29 | End: 2024-12-06

## 2024-11-29 RX ORDER — METOPROLOL TARTRATE 25 MG/1
25 TABLET, FILM COATED ORAL 2 TIMES DAILY
Qty: 180 TABLET | Refills: 2 | Status: SHIPPED | OUTPATIENT
Start: 2024-11-29

## 2024-11-29 ASSESSMENT — ENCOUNTER SYMPTOMS
EYE PAIN: 0
DIARRHEA: 0
TROUBLE SWALLOWING: 0
EYE REDNESS: 0
COUGH: 0
CONSTIPATION: 0
EYE DISCHARGE: 0
RHINORRHEA: 0
WHEEZING: 0
ALLERGIC/IMMUNOLOGIC NEGATIVE: 1
NAUSEA: 0
BACK PAIN: 0
VOMITING: 0
SORE THROAT: 0
SHORTNESS OF BREATH: 0
ABDOMINAL PAIN: 0

## 2024-11-29 NOTE — PROGRESS NOTES
[facility] PROFESSIONAL SERVS  [facility]  [address] [address]  Dept: [phone]  Dept Fax: [phone]  Loc: [phone]     Visit Date:  11/29/2024      Patient:  Gisela Richmond  YOB: 1955    HPI:     Chief Complaint   Patient presents with    Hip Pain     Right hip pain, she had a hip replacement in 2012 and the arthritis in it really bothers her. Tylenol and ASA do not help.        Hip Pain       History of Present Illness  The patient presents for evaluation of hip pain.    He has been experiencing hip pain for several months, which began after his wellness checkup a few months ago. The pain intensifies when she stands on concrete all day. Initially, the pain subsides, but it intensifies around noon. He is considering the possibility of disability benefits. In 2012, she underwent a hip replacement. Does limp.        Medications    Current Outpatient Medications:     traMADol (ULTRAM) 50 MG tablet, Take 1 tablet by mouth every 6 hours as needed for Pain for up to 7 days. Intended supply: 7 days. Take lowest dose possible to manage pain Max Daily Amount: 200 mg, Disp: 28 tablet, Rfl: 0    metoprolol tartrate (LOPRESSOR) 25 MG tablet, Take 1 tablet by mouth 2 times daily, Disp: 180 tablet, Rfl: 2    aspirin 81 MG EC tablet, Take 1 tablet by mouth daily, Disp: , Rfl:     calcium carbonate (OSCAL) 500 MG TABS tablet, Take 1 tablet by mouth daily, Disp: , Rfl:     ferrous sulfate (IRON 325) 325 (65 Fe) MG tablet, Take 1 tablet by mouth Daily with lunch, Disp: , Rfl:     Multiple Vitamins-Minerals (PRESERVISION AREDS PO), Take 2 tablets by mouth daily, Disp: , Rfl:     Multiple Vitamin TABS, Take by mouth daily, Disp: , Rfl:     The patient is allergic to augmentin [amoxicillin-pot clavulanate].    Past Medical History  Gisela  has a past medical history of Anemia, COPD (chronic obstructive pulmonary disease) (HCC), Family history of breast cancer

## 2024-12-16 ENCOUNTER — OFFICE VISIT (OUTPATIENT)
Dept: FAMILY MEDICINE CLINIC | Age: 69
End: 2024-12-16
Payer: MEDICARE

## 2024-12-16 VITALS
OXYGEN SATURATION: 98 % | HEIGHT: 64 IN | DIASTOLIC BLOOD PRESSURE: 64 MMHG | BODY MASS INDEX: 17.58 KG/M2 | SYSTOLIC BLOOD PRESSURE: 108 MMHG | RESPIRATION RATE: 16 BRPM | WEIGHT: 103 LBS | TEMPERATURE: 97.3 F | HEART RATE: 75 BPM

## 2024-12-16 DIAGNOSIS — J20.9 ACUTE BRONCHITIS, UNSPECIFIED ORGANISM: ICD-10-CM

## 2024-12-16 DIAGNOSIS — J02.9 SORE THROAT: Primary | ICD-10-CM

## 2024-12-16 LAB
Lab: NORMAL
QC PASS/FAIL: NORMAL
SARS-COV-2 RDRP RESP QL NAA+PROBE: NEGATIVE

## 2024-12-16 PROCEDURE — 1123F ACP DISCUSS/DSCN MKR DOCD: CPT | Performed by: NURSE PRACTITIONER

## 2024-12-16 PROCEDURE — 87635 SARS-COV-2 COVID-19 AMP PRB: CPT | Performed by: NURSE PRACTITIONER

## 2024-12-16 PROCEDURE — 99214 OFFICE O/P EST MOD 30 MIN: CPT | Performed by: NURSE PRACTITIONER

## 2024-12-16 PROCEDURE — 1160F RVW MEDS BY RX/DR IN RCRD: CPT | Performed by: NURSE PRACTITIONER

## 2024-12-16 PROCEDURE — 1159F MED LIST DOCD IN RCRD: CPT | Performed by: NURSE PRACTITIONER

## 2024-12-16 RX ORDER — BENZONATATE 200 MG/1
200 CAPSULE ORAL 3 TIMES DAILY PRN
Qty: 21 CAPSULE | Refills: 0 | Status: SHIPPED | OUTPATIENT
Start: 2024-12-16 | End: 2024-12-23

## 2024-12-16 RX ORDER — DOXYCYCLINE HYCLATE 100 MG
100 TABLET ORAL 2 TIMES DAILY
Qty: 20 TABLET | Refills: 0 | Status: SHIPPED | OUTPATIENT
Start: 2024-12-16 | End: 2024-12-26

## 2024-12-16 RX ORDER — PREDNISONE 20 MG/1
20 TABLET ORAL 2 TIMES DAILY
Qty: 10 TABLET | Refills: 0 | Status: SHIPPED | OUTPATIENT
Start: 2024-12-16 | End: 2024-12-21

## 2024-12-16 RX ORDER — CODEINE PHOSPHATE AND GUAIFENESIN 10; 100 MG/5ML; MG/5ML
5 SOLUTION ORAL 3 TIMES DAILY PRN
Qty: 75 ML | Refills: 0 | Status: SHIPPED | OUTPATIENT
Start: 2024-12-16 | End: 2024-12-21

## 2024-12-16 ASSESSMENT — ENCOUNTER SYMPTOMS
SORE THROAT: 1
ALLERGIC/IMMUNOLOGIC NEGATIVE: 1
WHEEZING: 0
BACK PAIN: 0
CONSTIPATION: 0
VOMITING: 0
EYE REDNESS: 0
DIARRHEA: 0
EYE DISCHARGE: 0
TROUBLE SWALLOWING: 0
COUGH: 1
SHORTNESS OF BREATH: 0
EYE PAIN: 0
RHINORRHEA: 1
CHEST TIGHTNESS: 1
ABDOMINAL PAIN: 0
NAUSEA: 0

## 2024-12-27 ENCOUNTER — HOSPITAL ENCOUNTER (OUTPATIENT)
Dept: INFUSION THERAPY | Age: 69
Discharge: HOME | End: 2024-12-27
Payer: MEDICARE

## 2024-12-27 ENCOUNTER — OFFICE VISIT (OUTPATIENT)
Dept: ONCOLOGY | Age: 69
End: 2024-12-27
Payer: MEDICARE

## 2024-12-27 VITALS
TEMPERATURE: 98.4 F | DIASTOLIC BLOOD PRESSURE: 57 MMHG | HEART RATE: 74 BPM | SYSTOLIC BLOOD PRESSURE: 109 MMHG | RESPIRATION RATE: 18 BRPM

## 2024-12-27 VITALS
OXYGEN SATURATION: 100 % | HEART RATE: 74 BPM | WEIGHT: 99.4 LBS | TEMPERATURE: 98.4 F | BODY MASS INDEX: 16.97 KG/M2 | HEIGHT: 64 IN | DIASTOLIC BLOOD PRESSURE: 57 MMHG | RESPIRATION RATE: 18 BRPM | SYSTOLIC BLOOD PRESSURE: 109 MMHG

## 2024-12-27 DIAGNOSIS — D75.9 CLONAL CYTOPENIA OF UNDETERMINED SIGNIFICANCE (CCUS): ICD-10-CM

## 2024-12-27 DIAGNOSIS — D75.9 CLONAL CYTOPENIA OF UNDETERMINED SIGNIFICANCE (CCUS): Primary | ICD-10-CM

## 2024-12-27 DIAGNOSIS — D75.9 CYTOPENIA: ICD-10-CM

## 2024-12-27 LAB
BASOPHILS ABSOLUTE: 0.1 THOU/MM3 (ref 0–0.1)
BASOPHILS NFR BLD AUTO: 1 % (ref 0–3)
EOSINOPHIL NFR BLD AUTO: 0 % (ref 0–4)
EOSINOPHILS ABSOLUTE: 0 THOU/MM3 (ref 0–0.4)
ERYTHROCYTE [DISTWIDTH] IN BLOOD BY AUTOMATED COUNT: 14.2 % (ref 11.5–14.5)
FOLATE SERPL-MCNC: > 20 NG/ML (ref 4.8–24.2)
HCT VFR BLD AUTO: 26.7 % (ref 37–47)
HGB BLD-MCNC: 8.3 GM/DL (ref 12–16)
IMMATURE GRANULOCYTES %: 0 %
IMMATURE GRANULOCYTES ABSOLUTE: 0.02 THOU/MM3 (ref 0–0.07)
IRON SATN MFR SERPL: 30 % (ref 20–50)
IRON SERPL-MCNC: 49 UG/DL (ref 50–170)
LYMPHOCYTES ABSOLUTE: 1 THOU/MM3 (ref 1–4.8)
LYMPHOCYTES NFR BLD AUTO: 17 % (ref 15–47)
MCH RBC QN AUTO: 32 PG (ref 26–33)
MCHC RBC AUTO-ENTMCNC: 31.1 GM/DL (ref 32.2–35.5)
MCV RBC AUTO: 103 FL (ref 81–99)
MONOCYTES ABSOLUTE: 2.8 THOU/MM3 (ref 0.4–1.3)
MONOCYTES NFR BLD AUTO: 49 % (ref 0–12)
NEUTROPHILS ABSOLUTE: 1.8 THOU/MM3 (ref 1.8–7.7)
NEUTROPHILS NFR BLD AUTO: 32 % (ref 43–75)
PATHOLOGIST REVIEW: ABNORMAL
PLATELET # BLD AUTO: 87 THOU/MM3 (ref 130–400)
PMV BLD AUTO: 12.2 FL (ref 9.4–12.4)
RBC # BLD AUTO: 2.59 MILL/MM3 (ref 4.2–5.4)
TIBC SERPL-MCNC: 166 UG/DL (ref 171–450)
VIT B12 SERPL-MCNC: 1723 PG/ML (ref 211–911)
WBC # BLD AUTO: 5.7 THOU/MM3 (ref 4.8–10.8)

## 2024-12-27 PROCEDURE — 85025 COMPLETE CBC W/AUTO DIFF WBC: CPT

## 2024-12-27 PROCEDURE — 1123F ACP DISCUSS/DSCN MKR DOCD: CPT | Performed by: INTERNAL MEDICINE

## 2024-12-27 PROCEDURE — 99214 OFFICE O/P EST MOD 30 MIN: CPT | Performed by: INTERNAL MEDICINE

## 2024-12-27 PROCEDURE — 83540 ASSAY OF IRON: CPT

## 2024-12-27 PROCEDURE — 1126F AMNT PAIN NOTED NONE PRSNT: CPT | Performed by: INTERNAL MEDICINE

## 2024-12-27 PROCEDURE — 1159F MED LIST DOCD IN RCRD: CPT | Performed by: INTERNAL MEDICINE

## 2024-12-27 PROCEDURE — 82746 ASSAY OF FOLIC ACID SERUM: CPT

## 2024-12-27 PROCEDURE — 99211 OFF/OP EST MAY X REQ PHY/QHP: CPT

## 2024-12-27 PROCEDURE — 82607 VITAMIN B-12: CPT

## 2024-12-27 PROCEDURE — 83550 IRON BINDING TEST: CPT

## 2024-12-27 PROCEDURE — 36415 COLL VENOUS BLD VENIPUNCTURE: CPT

## 2024-12-27 NOTE — PROGRESS NOTES
marrow biopsy today. Patient is agreeable to schedule today.     Interim results (7/30/24): Bone marrow biopsy showed:   #Mildly hypercellular bone marrow (50%) with a left shifted granulocytic hyperplasia and orderly maturation.   #Lymphoid aggregates are present, favor reactive.   #Peripheral blood: Mild pancytopenia with macrocytic anemia and increased percentage of monocytes without an absolute monocytosis.   #Flow cytometric analysis demonstrates a mild increase in immature monocytes with no immunophenotypic aberrancy.   #No significant morphologic or flow cytometric evidence of leukemia, lymphoma, myelodysplasia, myeloproliferative neoplasm, or plasma cell neoplasm.   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  She denied any acute symptoms nor complaints.  -CBC showed stable findings (WBC 2.8, ANC 1, Hgb 10.5, Plt 78). GERA and RF as well are BCR/ABL pending.  -I'll check Copper and Zinc.  -I also reviewed the results of bone marrow workup with the patient today.  -She has no morphologic evidence of myeloid malignancy on bone marrow biopsy.    -Although monosomy X is detected, this finding could be related to normal age versus hematologic malignancy related clonal abnormality.  -Indeed, myeloid malignancy mutational panel by NGS (pending) will be critical to further define underlying disease.  -I plan on seeing her virtually (as patient requested) on 8/23/24 to discuss NGS results and further recommendation.    8/23/24: Met with the patient virtually and updated her on the results.  -Rheumatoid factor is normal  -GERA screen with reflex was negative  -Zinc and copper levels were normal.  -BCR-ABL not detected  #Myeloid Malignancies Mutation Panel NGS:  TIER 1: Variants of Known Clinical Significance in Hematologic Malignancies   1. KRAS c.35G>A, p.[id number] ([id number]) VAF:

## 2024-12-27 NOTE — PATIENT INSTRUCTIONS
-Draw the ordered blood work.  - referral.  -Return to clinic in 4 weeks for blood work and follow up.

## 2025-01-22 ENCOUNTER — OFFICE VISIT (OUTPATIENT)
Dept: FAMILY MEDICINE CLINIC | Age: 70
End: 2025-01-22
Payer: MEDICARE

## 2025-01-22 VITALS
OXYGEN SATURATION: 98 % | HEIGHT: 64 IN | RESPIRATION RATE: 16 BRPM | WEIGHT: 105.8 LBS | HEART RATE: 94 BPM | SYSTOLIC BLOOD PRESSURE: 110 MMHG | BODY MASS INDEX: 18.06 KG/M2 | DIASTOLIC BLOOD PRESSURE: 72 MMHG

## 2025-01-22 DIAGNOSIS — J44.1 COPD WITH ACUTE EXACERBATION (HCC): Primary | ICD-10-CM

## 2025-01-22 DIAGNOSIS — I42.8 OTHER CARDIOMYOPATHIES (HCC): ICD-10-CM

## 2025-01-22 DIAGNOSIS — D61.818 PANCYTOPENIA (HCC): ICD-10-CM

## 2025-01-22 PROCEDURE — 1159F MED LIST DOCD IN RCRD: CPT | Performed by: NURSE PRACTITIONER

## 2025-01-22 PROCEDURE — 99214 OFFICE O/P EST MOD 30 MIN: CPT | Performed by: NURSE PRACTITIONER

## 2025-01-22 PROCEDURE — 1160F RVW MEDS BY RX/DR IN RCRD: CPT | Performed by: NURSE PRACTITIONER

## 2025-01-22 PROCEDURE — 1123F ACP DISCUSS/DSCN MKR DOCD: CPT | Performed by: NURSE PRACTITIONER

## 2025-01-22 RX ORDER — DOXYCYCLINE HYCLATE 100 MG
100 TABLET ORAL 2 TIMES DAILY
Qty: 20 TABLET | Refills: 0 | Status: SHIPPED | OUTPATIENT
Start: 2025-01-22 | End: 2025-02-01

## 2025-01-22 RX ORDER — PREDNISONE 20 MG/1
20 TABLET ORAL 2 TIMES DAILY
Qty: 10 TABLET | Refills: 0 | Status: SHIPPED | OUTPATIENT
Start: 2025-01-22 | End: 2025-01-27

## 2025-01-22 RX ORDER — BENZONATATE 200 MG/1
200 CAPSULE ORAL 3 TIMES DAILY PRN
Qty: 21 CAPSULE | Refills: 0 | Status: SHIPPED | OUTPATIENT
Start: 2025-01-22 | End: 2025-01-29

## 2025-01-22 RX ORDER — CODEINE PHOSPHATE AND GUAIFENESIN 10; 100 MG/5ML; MG/5ML
5 SOLUTION ORAL 3 TIMES DAILY PRN
Qty: 75 ML | Refills: 0 | Status: SHIPPED | OUTPATIENT
Start: 2025-01-22 | End: 2025-01-27

## 2025-01-22 SDOH — ECONOMIC STABILITY: FOOD INSECURITY: WITHIN THE PAST 12 MONTHS, THE FOOD YOU BOUGHT JUST DIDN'T LAST AND YOU DIDN'T HAVE MONEY TO GET MORE.: NEVER TRUE

## 2025-01-22 SDOH — ECONOMIC STABILITY: FOOD INSECURITY: WITHIN THE PAST 12 MONTHS, YOU WORRIED THAT YOUR FOOD WOULD RUN OUT BEFORE YOU GOT MONEY TO BUY MORE.: NEVER TRUE

## 2025-01-22 ASSESSMENT — ENCOUNTER SYMPTOMS
WHEEZING: 1
DIARRHEA: 0
BACK PAIN: 0
RHINORRHEA: 0
EYE PAIN: 0
CONSTIPATION: 0
COUGH: 1
CHEST TIGHTNESS: 1
ALLERGIC/IMMUNOLOGIC NEGATIVE: 1
EYE DISCHARGE: 0
VOMITING: 0
EYE REDNESS: 0
SHORTNESS OF BREATH: 1
TROUBLE SWALLOWING: 0
NAUSEA: 0
ABDOMINAL PAIN: 0
SORE THROAT: 0

## 2025-01-22 ASSESSMENT — PATIENT HEALTH QUESTIONNAIRE - PHQ9
SUM OF ALL RESPONSES TO PHQ QUESTIONS 1-9: 0
SUM OF ALL RESPONSES TO PHQ9 QUESTIONS 1 & 2: 0
SUM OF ALL RESPONSES TO PHQ QUESTIONS 1-9: 0
2. FEELING DOWN, DEPRESSED OR HOPELESS: NOT AT ALL
SUM OF ALL RESPONSES TO PHQ QUESTIONS 1-9: 0
1. LITTLE INTEREST OR PLEASURE IN DOING THINGS: NOT AT ALL
SUM OF ALL RESPONSES TO PHQ QUESTIONS 1-9: 0

## 2025-01-22 NOTE — PROGRESS NOTES
[facility] PROFESSIONAL SERVS  [facility]  [address] [address]  Dept: [phone]  Dept Fax: [phone]  Loc: [phone]     Visit Date:  1/22/2025      Patient:  Gisela Richmond  YOB: 1955    HPI:     Chief Complaint   Patient presents with    Cough     Cough. Chest tightness due to coughing. Thick mucous. Nasal congestion. Chest congestion. Has been drinking herbal tea.        Cough  Associated symptoms include shortness of breath and wheezing. Pertinent negatives include no ear pain, eye redness, fever, headaches, myalgias, rash, rhinorrhea or sore throat.     History of Present Illness  The patient is a 69-year-old female who presents for evaluation of a head cold.    She reports experiencing symptoms consistent with a severe head cold, which began on Thursday. She had to leave work after an hour due to feeling unwell and persistent rhinorrhea. She has been managing her symptoms with over-the-counter medications such as Tamiko-Lubbock Cold and Cough, which have provided some relief. Her nasal discharge has transitioned from a runny consistency to a thicker, mucus-like state. She reports no fever or vomiting. However, she has been experiencing frequent coughing episodes, during which she expectorates a significant amount of phlegm. To alleviate her throat discomfort, she has been consuming herbal tea. The severity of her coughing necessitates sitting up on the side of her bed to catch her breath. The frequency and intensity of her coughing have resulted in chest tightness and occasional shortness of breath. She reports no body aches, diarrhea, or sore throat.    MEDICATIONS  Current: Tamiko-L[place]bock cold and cough      Medications    Current Outpatient Medications:     doxycycline hyclate (VIBRA-TABS) 100 MG tablet, Take 1 tablet by mouth 2 times daily for 10 days, Disp: 20 tablet, Rfl: 0    predniSONE (DELTASONE) 20 MG tablet, Take 1 tablet by

## 2025-01-29 ENCOUNTER — OFFICE VISIT (OUTPATIENT)
Dept: ONCOLOGY | Age: 70
End: 2025-01-29
Payer: MEDICARE

## 2025-01-29 ENCOUNTER — HOSPITAL ENCOUNTER (OUTPATIENT)
Dept: INFUSION THERAPY | Age: 70
Discharge: HOME | End: 2025-01-29
Payer: MEDICARE

## 2025-01-29 VITALS
HEIGHT: 64 IN | DIASTOLIC BLOOD PRESSURE: 53 MMHG | TEMPERATURE: 97.9 F | SYSTOLIC BLOOD PRESSURE: 115 MMHG | HEART RATE: 69 BPM | BODY MASS INDEX: 17.75 KG/M2 | RESPIRATION RATE: 18 BRPM | OXYGEN SATURATION: 96 % | WEIGHT: 104 LBS

## 2025-01-29 VITALS
SYSTOLIC BLOOD PRESSURE: 115 MMHG | TEMPERATURE: 97.9 F | DIASTOLIC BLOOD PRESSURE: 53 MMHG | WEIGHT: 104 LBS | HEART RATE: 69 BPM | RESPIRATION RATE: 18 BRPM | HEIGHT: 64 IN | BODY MASS INDEX: 17.75 KG/M2 | OXYGEN SATURATION: 96 %

## 2025-01-29 DIAGNOSIS — D75.9 CYTOPENIA: ICD-10-CM

## 2025-01-29 DIAGNOSIS — D75.9 CLONAL CYTOPENIA OF UNDETERMINED SIGNIFICANCE (CCUS): Primary | ICD-10-CM

## 2025-01-29 DIAGNOSIS — D75.9 CLONAL CYTOPENIA OF UNDETERMINED SIGNIFICANCE (CCUS): ICD-10-CM

## 2025-01-29 LAB
BASOPHILS ABSOLUTE: 0 THOU/MM3 (ref 0–0.1)
BASOPHILS NFR BLD AUTO: 0.4 %
BUN BLDP-MCNC: 33 MG/DL (ref 8–26)
CHLORIDE BLD-SCNC: 103 MEQ/L (ref 98–109)
CREAT BLD-MCNC: 1.3 MG/DL (ref 0.5–1.2)
DEPRECATED RDW RBC AUTO: 53.5 FL (ref 35–45)
EOSINOPHIL NFR BLD AUTO: 1 %
EOSINOPHILS ABSOLUTE: 0.1 THOU/MM3 (ref 0–0.4)
ERYTHROCYTE [DISTWIDTH] IN BLOOD BY AUTOMATED COUNT: 14.9 % (ref 11.5–14.5)
GFR SERPL CREATININE-BSD FRML MDRD: 44 ML/MIN/1.73M2
GLUCOSE BLD-MCNC: 89 MG/DL (ref 70–108)
HCT VFR BLD AUTO: 32.4 % (ref 37–47)
HGB BLD-MCNC: 9.7 GM/DL (ref 12–16)
IMM GRANULOCYTES # BLD AUTO: 0.79 THOU/MM3 (ref 0–0.07)
IMM GRANULOCYTES NFR BLD AUTO: 7.1 %
IONIZED CALCIUM, WHOLE BLOOD: 1.12 MMOL/L (ref 1.12–1.32)
LYMPHOCYTES ABSOLUTE: 2.1 THOU/MM3 (ref 1–4.8)
LYMPHOCYTES NFR BLD AUTO: 18.9 %
MCH RBC QN AUTO: 29.9 PG (ref 26–33)
MCHC RBC AUTO-ENTMCNC: 29.9 GM/DL (ref 32.2–35.5)
MCV RBC AUTO: 100 FL (ref 81–99)
MONOCYTES ABSOLUTE: 3.8 THOU/MM3 (ref 0.4–1.3)
MONOCYTES NFR BLD AUTO: 33.5 %
NEUTROPHILS ABSOLUTE: 4.4 THOU/MM3 (ref 1.8–7.7)
NEUTROPHILS NFR BLD AUTO: 39.1 %
NRBC BLD AUTO-RTO: 0 /100 WBC
PATHOLOGIST REVIEW: ABNORMAL
PLATELET # BLD AUTO: 120 THOU/MM3 (ref 130–400)
PMV BLD AUTO: 12.1 FL (ref 9.4–12.4)
POTASSIUM BLD-SCNC: 4.4 MEQ/L (ref 3.5–4.9)
RBC # BLD AUTO: 3.24 MILL/MM3 (ref 4.2–5.4)
SCAN OF BLOOD SMEAR: NORMAL
SODIUM BLD-SCNC: 138 MEQ/L (ref 138–146)
TOTAL CO2, WHOLE BLOOD: 27 MEQ/L (ref 23–33)
WBC # BLD AUTO: 11.2 THOU/MM3 (ref 4.8–10.8)

## 2025-01-29 PROCEDURE — 1123F ACP DISCUSS/DSCN MKR DOCD: CPT | Performed by: INTERNAL MEDICINE

## 2025-01-29 PROCEDURE — 85025 COMPLETE CBC W/AUTO DIFF WBC: CPT

## 2025-01-29 PROCEDURE — 99211 OFF/OP EST MAY X REQ PHY/QHP: CPT

## 2025-01-29 PROCEDURE — 1126F AMNT PAIN NOTED NONE PRSNT: CPT | Performed by: INTERNAL MEDICINE

## 2025-01-29 PROCEDURE — 80047 BASIC METABLC PNL IONIZED CA: CPT

## 2025-01-29 PROCEDURE — 36415 COLL VENOUS BLD VENIPUNCTURE: CPT

## 2025-01-29 PROCEDURE — 1159F MED LIST DOCD IN RCRD: CPT | Performed by: INTERNAL MEDICINE

## 2025-01-29 PROCEDURE — 99214 OFFICE O/P EST MOD 30 MIN: CPT | Performed by: INTERNAL MEDICINE

## 2025-01-29 NOTE — PROGRESS NOTES
I explained to her the signs and symptoms that could be concerning for worsening cytopenia. Patient voiced full understanding and agreement.  -Moreover, she will continue taking iron supplement with vitamin C.  We will continue to monitor iron panel periodically and assess response to treatment and the need for IV iron infusions if indicated.  -Plan to have the patient return to infusion center on 4/22/2025 for blood work (including iron panel as well).  -She will return to clinic on 4/29/25 for follow up and to discuss results and recommendations.    All patient questions answered. Pt voiced understanding. Patient agreed with treatment plan. Follow up as directed. Patient instructed to call for questions or concerns.      Electronically signed by   Hassan Awada, MD

## 2025-01-29 NOTE — PATIENT INSTRUCTIONS
-Return to infusion center on 4/22/2025 for blood work.  -Return to clinic on 4/29/25 for follow up and to discuss results and recommendations.

## 2025-03-11 DIAGNOSIS — Z95.3 FOLLOW-UP VISIT FOR AORTIC VALVE REPLACEMENT WITH BIOPROSTHETIC VALVE: ICD-10-CM

## 2025-03-11 DIAGNOSIS — Z09 FOLLOW-UP VISIT FOR AORTIC VALVE REPLACEMENT WITH BIOPROSTHETIC VALVE: ICD-10-CM

## 2025-03-11 RX ORDER — METOPROLOL TARTRATE 25 MG/1
25 TABLET, FILM COATED ORAL 2 TIMES DAILY
Qty: 180 TABLET | Refills: 3 | Status: SHIPPED | OUTPATIENT
Start: 2025-03-11

## 2025-04-18 ENCOUNTER — HOSPITAL ENCOUNTER (OUTPATIENT)
Dept: WOMENS IMAGING | Age: 70
Discharge: HOME | End: 2025-04-18
Payer: MEDICARE

## 2025-04-18 VITALS — HEIGHT: 64 IN | WEIGHT: 104 LBS | BODY MASS INDEX: 17.75 KG/M2

## 2025-04-18 DIAGNOSIS — Z12.31 VISIT FOR SCREENING MAMMOGRAM: ICD-10-CM

## 2025-04-18 PROCEDURE — 77063 BREAST TOMOSYNTHESIS BI: CPT

## 2025-04-28 DIAGNOSIS — D75.9 CYTOPENIA: Primary | ICD-10-CM

## 2025-04-28 DIAGNOSIS — D61.818 PANCYTOPENIA (HCC): ICD-10-CM

## 2025-04-28 DIAGNOSIS — D75.9 CLONAL CYTOPENIA OF UNDETERMINED SIGNIFICANCE (CCUS): ICD-10-CM

## 2025-04-29 ENCOUNTER — HOSPITAL ENCOUNTER (OUTPATIENT)
Dept: INFUSION THERAPY | Age: 70
Discharge: HOME | End: 2025-04-29
Payer: MEDICARE

## 2025-04-29 ENCOUNTER — OFFICE VISIT (OUTPATIENT)
Dept: ONCOLOGY | Age: 70
End: 2025-04-29
Payer: MEDICARE

## 2025-04-29 VITALS
HEART RATE: 78 BPM | TEMPERATURE: 97.6 F | RESPIRATION RATE: 18 BRPM | OXYGEN SATURATION: 97 % | DIASTOLIC BLOOD PRESSURE: 60 MMHG | SYSTOLIC BLOOD PRESSURE: 131 MMHG

## 2025-04-29 VITALS
RESPIRATION RATE: 18 BRPM | TEMPERATURE: 97.6 F | DIASTOLIC BLOOD PRESSURE: 60 MMHG | SYSTOLIC BLOOD PRESSURE: 131 MMHG | HEIGHT: 64 IN | BODY MASS INDEX: 18.27 KG/M2 | HEART RATE: 78 BPM | WEIGHT: 107 LBS | OXYGEN SATURATION: 97 %

## 2025-04-29 DIAGNOSIS — D75.9 CYTOPENIA: ICD-10-CM

## 2025-04-29 DIAGNOSIS — D61.818 PANCYTOPENIA (HCC): ICD-10-CM

## 2025-04-29 DIAGNOSIS — D75.9 CLONAL CYTOPENIA OF UNDETERMINED SIGNIFICANCE (CCUS): Primary | ICD-10-CM

## 2025-04-29 DIAGNOSIS — D75.9 CLONAL CYTOPENIA OF UNDETERMINED SIGNIFICANCE (CCUS): ICD-10-CM

## 2025-04-29 LAB
ALBUMIN SERPL BCG-MCNC: 4 G/DL (ref 3.4–4.9)
ALP SERPL-CCNC: 73 U/L (ref 38–126)
ALT SERPL W/O P-5'-P-CCNC: 9 U/L (ref 10–35)
AST SERPL-CCNC: 13 U/L (ref 10–35)
BASOPHILS ABSOLUTE: 0 THOU/MM3 (ref 0–0.1)
BASOPHILS NFR BLD AUTO: 1 % (ref 0–3)
BILIRUB CONJ SERPL-MCNC: 0.2 MG/DL (ref 0–0.2)
BILIRUB SERPL-MCNC: 0.4 MG/DL (ref 0.3–1.2)
BUN BLDP-MCNC: 23 MG/DL (ref 8–26)
CHLORIDE BLD-SCNC: 104 MEQ/L (ref 98–109)
CREAT BLD-MCNC: 1 MG/DL (ref 0.5–1.2)
EOSINOPHIL NFR BLD AUTO: 1 % (ref 0–4)
EOSINOPHILS ABSOLUTE: 0 THOU/MM3 (ref 0–0.4)
ERYTHROCYTE [DISTWIDTH] IN BLOOD BY AUTOMATED COUNT: 13.3 % (ref 11.5–14.5)
GFR SERPL CREATININE-BSD FRML MDRD: 60 ML/MIN/1.73M2
GLUCOSE BLD-MCNC: 98 MG/DL (ref 70–108)
HCT VFR BLD AUTO: 29.2 % (ref 37–47)
HGB BLD-MCNC: 9.3 GM/DL (ref 12–16)
IMMATURE GRANULOCYTES %: 0 %
IMMATURE GRANULOCYTES ABSOLUTE: 0 THOU/MM3 (ref 0–0.07)
IONIZED CALCIUM, WHOLE BLOOD: 1.19 MMOL/L (ref 1.12–1.32)
LYMPHOCYTES ABSOLUTE: 1 THOU/MM3 (ref 1–4.8)
LYMPHOCYTES NFR BLD AUTO: 39 % (ref 15–47)
MCH RBC QN AUTO: 32.5 PG (ref 26–33)
MCHC RBC AUTO-ENTMCNC: 31.8 GM/DL (ref 32.2–35.5)
MCV RBC AUTO: 102 FL (ref 81–99)
MONOCYTES ABSOLUTE: 0.8 THOU/MM3 (ref 0.4–1.3)
MONOCYTES NFR BLD AUTO: 32 % (ref 0–12)
NEUTROPHILS ABSOLUTE: 0.7 THOU/MM3 (ref 1.8–7.7)
NEUTROPHILS NFR BLD AUTO: 27 % (ref 43–75)
PLATELET # BLD AUTO: 62 THOU/MM3 (ref 130–400)
PMV BLD AUTO: 11.2 FL (ref 9.4–12.4)
POTASSIUM BLD-SCNC: 4.8 MEQ/L (ref 3.5–4.9)
PROT SERPL-MCNC: 6 G/DL (ref 6.4–8.3)
RBC # BLD AUTO: 2.86 MILL/MM3 (ref 4.2–5.4)
SODIUM BLD-SCNC: 141 MEQ/L (ref 138–146)
TOTAL CO2, WHOLE BLOOD: 27 MEQ/L (ref 23–33)
WBC # BLD AUTO: 2.5 THOU/MM3 (ref 4.8–10.8)

## 2025-04-29 PROCEDURE — 80047 BASIC METABLC PNL IONIZED CA: CPT

## 2025-04-29 PROCEDURE — 1159F MED LIST DOCD IN RCRD: CPT | Performed by: INTERNAL MEDICINE

## 2025-04-29 PROCEDURE — 85025 COMPLETE CBC W/AUTO DIFF WBC: CPT

## 2025-04-29 PROCEDURE — 80076 HEPATIC FUNCTION PANEL: CPT

## 2025-04-29 PROCEDURE — 99211 OFF/OP EST MAY X REQ PHY/QHP: CPT

## 2025-04-29 PROCEDURE — 1126F AMNT PAIN NOTED NONE PRSNT: CPT | Performed by: INTERNAL MEDICINE

## 2025-04-29 PROCEDURE — 36415 COLL VENOUS BLD VENIPUNCTURE: CPT

## 2025-04-29 PROCEDURE — 99214 OFFICE O/P EST MOD 30 MIN: CPT | Performed by: INTERNAL MEDICINE

## 2025-04-29 PROCEDURE — 1123F ACP DISCUSS/DSCN MKR DOCD: CPT | Performed by: INTERNAL MEDICINE

## 2025-04-29 NOTE — PATIENT INSTRUCTIONS
-Ordered diagnostic bone marrow biopsy.  Please help schedule at ASAP.  -Schedule the patient to return to clinic 3 weeks after completion of bone marrow biopsy procedure.

## 2025-04-29 NOTE — PROGRESS NOTES
[facility] [address], [address]  [facility] [address]  Dept: [phone]  Dept Fax: [phone] Loc: [phone]      Visit Date:4/29/2025     Gisela Richmond is a 70 y.o. female who presents today for:   Chief Complaint   Patient presents with    Follow-up     Clonal cytopenia of undetermined significance (CCUS)        HPI:   Gisela Richmond is a 70 y.o. female referred to Hematology/Oncology clinic for evaluation of thrombocytopenia per her PCP, Cecil Rouse, NATALY-CNP.     She had routine lab work completed on 3/25/2024. CBC showed WBC count 2.8, Hgb 11.0, Hct 33.7, MCV 99, platelet count 76,000. BMP same date showed Cr 0.78, calcium 9.1 within normal limits. Total protein 6.5 and LFT within normal limits. She was referred for further evaluation.     She was seen as a new patient on 4/15/24.     The patient states she has been feeling well overall.  She affirms recent GI viral illness.  She stated this occurred several weeks before having recent labs.  She deniedunintentional weight loss, poor appetite, early satiety, persistent fever, recurrent infections, night sweats, or lymphadenopathy.  She denied history of autoimmune disease or liver disease. She stated \"I don't eat as good now\". Denied chest pain, SOB, abdominal pain, nausea, vomiting, bowel changes. Denied any abnormal bleeding.     Per review of EMR, she had colonoscopy on 8/7/23 per Dr. Piña showing no polyps seen, some residual liquid stool limiting visibility, hemorrhiods. She was recommended 10 year follow up.     PMH includes history of AVR, COPD. She affirms drinking approximately 3 beers per day. She is a not a current smoker; quit at age 66 smoked 6 years 1 ppd.     Interval History   -6/3/24 - She was seen as a new patient on 4/15/24 and had follow up on 5/6/24. She was recommend bone marrow biopsy at that time and patient declined scheduling at that time with 4 week follow up. She states over the last 4

## 2025-05-01 ENCOUNTER — TELEPHONE (OUTPATIENT)
Dept: CARDIOLOGY CLINIC | Age: 70
End: 2025-05-01

## 2025-05-01 NOTE — TELEPHONE ENCOUNTER
Pre op Risk Assessment    Procedure bone marrow biposy   Physician [facility] IR   Date of surgery/procedure 5/16/25    Last OV 9/16/24  Last Stress   Last Echo 4/18/22  Last Cath 10/20/21  SAVR 11/19/21    Last Stent ?  Is patient on blood thinners asa  Hold Meds/how many days  5??    PT WAS MADE PRN??    Fax [phone]

## 2025-05-16 ENCOUNTER — HOSPITAL ENCOUNTER (OUTPATIENT)
Dept: CT IMAGING | Age: 70
Discharge: HOME | End: 2025-05-16
Payer: MEDICARE

## 2025-05-16 VITALS
HEART RATE: 76 BPM | BODY MASS INDEX: 18.36 KG/M2 | DIASTOLIC BLOOD PRESSURE: 51 MMHG | SYSTOLIC BLOOD PRESSURE: 109 MMHG | WEIGHT: 107 LBS | RESPIRATION RATE: 18 BRPM | TEMPERATURE: 97.7 F | OXYGEN SATURATION: 98 %

## 2025-05-16 DIAGNOSIS — D61.818 PANCYTOPENIA (HCC): ICD-10-CM

## 2025-05-16 LAB
BASOPHILS ABSOLUTE: 0 THOU/MM3 (ref 0–0.1)
BASOPHILS NFR BLD AUTO: 0.7 %
DEPRECATED RDW RBC AUTO: 48.3 FL (ref 35–45)
DOHLE BOD BLD QL SMEAR: ABNORMAL
EOSINOPHIL NFR BLD AUTO: 0.3 %
EOSINOPHILS ABSOLUTE: 0 THOU/MM3 (ref 0–0.4)
ERYTHROCYTE [DISTWIDTH] IN BLOOD BY AUTOMATED COUNT: 13.1 % (ref 11.5–14.5)
HCT VFR BLD AUTO: 31.6 % (ref 37–47)
HGB BLD-MCNC: 10 GM/DL (ref 12–16)
IMM GRANULOCYTES # BLD AUTO: 0 THOU/MM3 (ref 0–0.07)
IMM GRANULOCYTES NFR BLD AUTO: 0 %
LYMPHOCYTES ABSOLUTE: 0.8 THOU/MM3 (ref 1–4.8)
LYMPHOCYTES NFR BLD AUTO: 27.8 %
MCH RBC QN AUTO: 31.8 PG (ref 26–33)
MCHC RBC AUTO-ENTMCNC: 31.6 GM/DL (ref 32.2–35.5)
MCV RBC AUTO: 100.6 FL (ref 81–99)
MONOCYTES ABSOLUTE: 0.8 THOU/MM3 (ref 0.4–1.3)
MONOCYTES NFR BLD AUTO: 28.9 %
NEUTROPHILS ABSOLUTE: 1.2 THOU/MM3 (ref 1.8–7.7)
NEUTROPHILS NFR BLD AUTO: 42.3 %
NRBC BLD AUTO-RTO: 0 /100 WBC
PLATELET # BLD AUTO: 89 THOU/MM3 (ref 130–400)
PLATELET BLD QL SMEAR: ABNORMAL
PMV BLD AUTO: 12.8 FL (ref 9.4–12.4)
RBC # BLD AUTO: 3.14 MILL/MM3 (ref 4.2–5.4)
SCAN OF BLOOD SMEAR: NORMAL
SPECIMEN SOURCE: NORMAL
WBC # BLD AUTO: 2.9 THOU/MM3 (ref 4.8–10.8)

## 2025-05-16 PROCEDURE — 6360000002 HC RX W HCPCS: Performed by: RADIOLOGY

## 2025-05-16 PROCEDURE — 2580000003 HC RX 258: Performed by: RADIOLOGY

## 2025-05-16 PROCEDURE — 77012 CT SCAN FOR NEEDLE BIOPSY: CPT

## 2025-05-16 RX ORDER — FENTANYL CITRATE 50 UG/ML
INJECTION, SOLUTION INTRAMUSCULAR; INTRAVENOUS PRN
Status: COMPLETED | OUTPATIENT
Start: 2025-05-16 | End: 2025-05-16

## 2025-05-16 RX ORDER — SODIUM CHLORIDE 450 MG/100ML
INJECTION, SOLUTION INTRAVENOUS CONTINUOUS
Status: DISCONTINUED | OUTPATIENT
Start: 2025-05-16 | End: 2025-05-17 | Stop reason: HOSPADM

## 2025-05-16 RX ORDER — MIDAZOLAM HYDROCHLORIDE 1 MG/ML
INJECTION, SOLUTION INTRAMUSCULAR; INTRAVENOUS PRN
Status: COMPLETED | OUTPATIENT
Start: 2025-05-16 | End: 2025-05-16

## 2025-05-16 RX ORDER — MIDAZOLAM HYDROCHLORIDE 1 MG/ML
1 INJECTION, SOLUTION INTRAMUSCULAR; INTRAVENOUS ONCE
Status: COMPLETED | OUTPATIENT
Start: 2025-05-16 | End: 2025-05-16

## 2025-05-16 RX ADMIN — MIDAZOLAM 0.5 MG: 1 INJECTION INTRAMUSCULAR; INTRAVENOUS at 08:28

## 2025-05-16 RX ADMIN — MIDAZOLAM 1 MG: 1 INJECTION INTRAMUSCULAR; INTRAVENOUS at 08:15

## 2025-05-16 RX ADMIN — FENTANYL CITRATE 25 MCG: 50 INJECTION, SOLUTION INTRAMUSCULAR; INTRAVENOUS at 08:22

## 2025-05-16 RX ADMIN — FENTANYL CITRATE 50 MCG: 50 INJECTION, SOLUTION INTRAMUSCULAR; INTRAVENOUS at 08:15

## 2025-05-16 RX ADMIN — MIDAZOLAM 0.5 MG: 1 INJECTION INTRAMUSCULAR; INTRAVENOUS at 08:22

## 2025-05-16 RX ADMIN — FENTANYL CITRATE 25 MCG: 50 INJECTION, SOLUTION INTRAMUSCULAR; INTRAVENOUS at 08:28

## 2025-05-16 RX ADMIN — SODIUM CHLORIDE: 0.45 INJECTION, SOLUTION INTRAVENOUS at 07:33

## 2025-05-16 ASSESSMENT — PAIN - FUNCTIONAL ASSESSMENT: PAIN_FUNCTIONAL_ASSESSMENT: 0-10

## 2025-05-16 ASSESSMENT — PAIN SCALES - GENERAL: PAINLEVEL_OUTOF10: 0

## 2025-05-16 NOTE — H&P
Formulation and discussion of sedation / procedure plans, risks, benefits, side effects and alternatives with patient and/or responsible adult completed.    History and Physical reviewed and unchanged.    Electronically signed by Michael Jaeger MD on 5/16/25 at 8:15 AM EDT

## 2025-05-16 NOTE — PROGRESS NOTES
0705 PT ADMITTED TO opn per ambulation for a ct bone marrow biopsy PT RIGHTS AND RESPONSIBILITIES OFFERED TO PT. Pt npo since last night except sip of water with meds.  Sister donavon at bedside.  Questions answered . Pt off his asa for  5 days   0730 lab work sent.    0738 report called to jamal huggins.    0800 pt taken per cart to ct scan   0845 pt return to room. Alert. Resp and easy. Denies pain . Denies sob.  Bandaid saturated to right hip. Buttocks  area . New bandaid with gauze applied. No active bleeding,  area soft/ no swelling, denies pain  0900 taking coffee in at present.   0915 pt up to bathroom.  Gait steady.   No bleeding noted to area   0930 discharge instructions reviewed with patient and her sister at bedside.  Verbalize understanding of home going   0940 up in room. Clothes changed. Adry well.  Bandaid remains dry to  right upper buttock no swelling, bleeding.    Pt denies pain. Eager for discharge.     0950 pt discharge per wheelchair to main entrance.  No concerns noted.                   ___m_ Safety:       (Environmental)  [place] to environment  Ensure ID band is correct and in place/ allergy band as needed  Assess for fall risk  Initiate fall precautions as applicable (fall band, side rails, etc.)  Call light within reach  Bed in low position/ wheels locked    __m__ Pain:       Assess pain level and characteristics  Administer analgesics as ordered  Assess effectiveness of pain management and report to MD as needed    ___m_ Knowledge Deficit:  Assess baseline knowledge  Provide teaching at level of understanding  Provide teaching via preferred learning method  Evaluate teaching effectiveness    __m__ Hemodynamic/Respiratory Status:       (Pre and Post Procedure Monitoring)  Assess/Monitor vital signs and LOC  Assess Baseline SpO2 prior to any sedation  Obtain weight/height  Assess vital signs/ LOC until patient meets discharge criteria  Monitor procedure site and notify MD of any issues

## 2025-05-16 NOTE — PROGRESS NOTES
0803 Patient received in CT scanning for bone marrow biopsy pre-procedure assessment. Monitor applied.   0810 Dr. Jaeger here; spoke to patient. This procedure has been fully reviewed with the patient and written informed consent has been obtained. Histology called to CT area for procedure.  0815 Patient assisted to CT table and pre scan started.   0820 Procedure started with Dr. Jaeger.  0822 Histology arrived to CT.   0828 Samples collected and given to histologist for further review.   0830 Procedure completed; patient tolerated well. Post scan obtained.   0832 Bacitracin oint, band aid to site; no bleeding noted.  0834 Patient on cart; comfort ensured.  0835 Report called to OPN and patient taken to OPN via cart. Pt alert and oriented x3; follows commands. Skin pink, warm, and dry. Respirations easy, regular, and nonlabored.

## 2025-05-16 NOTE — DISCHARGE INSTRUCTIONS
POST BIOPSY DISCHARGE INSTRUCTION SHEET    DIET:  As tolerated    ACTIVITY:  Rest at home on sofa, bed or recliner today.  Bathroom privileges only today.  Limit any exertion (pushing or pulling) today.  No lifting for 3 days.  No driving today.  Check biopsy site frequently today.  Resume any blood thinners in 24 hours.  Keep band aid clean & dry - replace as needed, may remove in 48 hours.    RETURN TO NEAREST EMERGENCY ROOM IF YOU HAVE ANY OF THE FOLLOWING:  Sign of bleeding, swelling, drainage from biopsy site or severe pain (slight discomfort to be expected) around biopsy site.  Repeated nausea/vomiting/abdominal pain.  Elevated temperature above 101 degrees.  Shortness of breath.  Chest pain.    Keep scheduled appointment with your physician.  If sedation given, follow post sedation instruction sheet.      _SEDATION/ANALGESIA INFORMATION HOME GOING ADVICE    Review the following information with the patient prior to the procedure.  Sedation/agalgesia is used during short medical procedures under controlled supervision.  The medication will produce a strong relaxation.  You will be able to hear, speak and follow instructions, but you memory and alertness will be decreased.  You will be able to swallow and breathe on your own.  During sedation/analgesia you blood pressure, hear and breathing will be watched closely.  After the procedure, you may not remember what was said or done.    Procedure: Ct bone marrow biopsy      Date:   5/16/ 25   You may have the following effects from the medication.  Drowsiness, dizziness, sleepiness or confusion.  Difficulty remembering or delayed reaction times.  Loss of fine muscle control or difficulty with your balance especially while walking.  Difficulty focusing or blurred vision.  You may not be aware of slight changes in your behavior and/or your reaction time because of the medication used during the procedure.  Therefore you should follow these instructions.  Have someone

## 2025-05-16 NOTE — H&P
[facility]  Sedation/Analgesia History & Physical    Pt Name: Gisela Richmond  MRN: [id number]  YOB: 1955  Provider Performing Procedure: Michael Jaeger MD, MD  Primary Care Physician: Cecil Rouse APRN - CNP    Formulation and discussion of sedation / procedure plans, risks, benefits, side effects and alternatives with patient and/or responsible adult completed.    PRE-PROCEDURE   DNR-CCA/DNR-CC []Yes [x]No  Brief History/Pre-Procedure Diagnosis: leukemia           MEDICAL HISTORY  []CAD/Valve  []Liver Disease  []Lung Disease []Diabetes  []Hypertension []Renal Disease  []Additional information:       has a past medical history of Anemia, COPD (chronic obstructive pulmonary disease) (HCC), Family history of breast cancer in sister, and Valvular heart disease.    SURGICAL HISTORY   has a past surgical history that includes Total hip arthroplasty (2012); hip surgery (Right, 2012); fracture surgery (Left, 2020); Aortic valve replacement (N/A, 11/19/2021); Colonoscopy (N/A, 8/7/2023); and CT BIOPSY BONE MARROW (7/30/2024).  Additional information:       ALLERGIES   Allergies as of 05/16/2025 - Fully Reviewed 05/16/2025   Allergen Reaction Noted    Augmentin [amoxicillin-pot clavulanate] Other (See Comments) 03/09/2023     Additional information:       MEDICATIONS   Coumadin Use Last 5 Days [x]No []Yes  Antiplatelet drug therapy use last 5 days  [x]No []Yes  Other anticoagulant use last 5 days  [x]No []Yes    Current Outpatient Medications:     metoprolol tartrate (LOPRESSOR) 25 MG tablet, Take 1 tablet by mouth 2 times daily, Disp: 180 tablet, Rfl: 3    calcium carbonate (OSCAL) 500 MG TABS tablet, Take 1 tablet by mouth daily, Disp: , Rfl:     ferrous sulfate (IRON 325) 325 (65 Fe) MG tablet, Take 1 tablet by mouth Daily with lunch, Disp: , Rfl:     Multiple Vitamins-Minerals (PRESERVISION AREDS PO), Take 2 tablets by mouth daily, Disp: , Rfl:     Multiple Vitamin TABS, Take by mouth

## 2025-05-16 NOTE — OP NOTE
Department of Radiology  Post Procedure Progress Note      Pre-Procedure Diagnosis:  leukemia     Procedure Performed:  CT guided bone marrow biopsy     Anesthesia: local / versed and fentanyl    Findings: successful    Immediate Complications:  None    Estimated Blood Loss: minimal    SEE DICTATED PROCEDURE NOTE FOR COMPLETE DETAILS.    Michael Jaeger MD   5/16/2025 8:31 AM

## 2025-05-18 LAB — LEUKEMIA/LYMPHOMA PHENO BONE MARROW: NORMAL

## 2025-05-27 ENCOUNTER — OFFICE VISIT (OUTPATIENT)
Dept: FAMILY MEDICINE CLINIC | Age: 70
End: 2025-05-27
Payer: MEDICARE

## 2025-05-27 VITALS
HEART RATE: 78 BPM | WEIGHT: 109 LBS | TEMPERATURE: 97.9 F | BODY MASS INDEX: 19.31 KG/M2 | DIASTOLIC BLOOD PRESSURE: 70 MMHG | HEIGHT: 63 IN | SYSTOLIC BLOOD PRESSURE: 138 MMHG

## 2025-05-27 DIAGNOSIS — Z87.891 PERSONAL HISTORY OF TOBACCO USE: ICD-10-CM

## 2025-05-27 DIAGNOSIS — D75.9 CLONAL CYTOPENIA OF UNDETERMINED SIGNIFICANCE (CCUS): ICD-10-CM

## 2025-05-27 DIAGNOSIS — H61.23 BILATERAL IMPACTED CERUMEN: ICD-10-CM

## 2025-05-27 DIAGNOSIS — Z00.00 MEDICARE ANNUAL WELLNESS VISIT, SUBSEQUENT: Primary | ICD-10-CM

## 2025-05-27 PROCEDURE — 1123F ACP DISCUSS/DSCN MKR DOCD: CPT | Performed by: NURSE PRACTITIONER

## 2025-05-27 PROCEDURE — 1159F MED LIST DOCD IN RCRD: CPT | Performed by: NURSE PRACTITIONER

## 2025-05-27 PROCEDURE — 1160F RVW MEDS BY RX/DR IN RCRD: CPT | Performed by: NURSE PRACTITIONER

## 2025-05-27 PROCEDURE — G0296 VISIT TO DETERM LDCT ELIG: HCPCS | Performed by: NURSE PRACTITIONER

## 2025-05-27 PROCEDURE — G0439 PPPS, SUBSEQ VISIT: HCPCS | Performed by: NURSE PRACTITIONER

## 2025-05-27 RX ORDER — POLYETHYLENE GLYCOL AND PROPYLENE GLYCOL 4; 3 MG/ML; MG/ML
SOLUTION/ DROPS OPHTHALMIC
COMMUNITY

## 2025-05-27 ASSESSMENT — PATIENT HEALTH QUESTIONNAIRE - PHQ9
SUM OF ALL RESPONSES TO PHQ QUESTIONS 1-9: 0
2. FEELING DOWN, DEPRESSED OR HOPELESS: NOT AT ALL
SUM OF ALL RESPONSES TO PHQ QUESTIONS 1-9: 0
1. LITTLE INTEREST OR PLEASURE IN DOING THINGS: NOT AT ALL

## 2025-05-27 NOTE — PATIENT INSTRUCTIONS
Your CMA today was Angie!            Learning About Lung Cancer Screening  What is screening for lung cancer?     Lung cancer screening is a way to find some lung cancers early, before a person has any symptoms of the cancer.  Lung cancer screening may help those who have the highest risk for lung cancer--people age 50 and older who are or were heavy smokers. For most people, who aren't at increased risk, screening for lung cancer probably isn't helpful.  Screening won't prevent cancer. And it may not find all lung cancers. Lung cancer screening may lower the risk of dying from lung cancer in a small number of people.  How is it done?  Lung cancer screening is done with a low-dose CT (computed tomography) scan. A CT scan uses X-rays, or radiation, to make detailed pictures of your body. Experts recommend that screening be done in medical centers that focus on finding and treating lung cancer.  Who is screening recommended for?  Lung cancer screening is recommended for people age 50 and older who are or were heavy smokers. That means people with a smoking history of at least 20 pack years. A pack year is a way to measure how heavy a smoker you are or were.  To figure out your pack years, multiply how many packs a day on average (assuming 20 cigarettes per pack) you have smoked by how many years you have smoked. For example:  If you smoked 1 pack a day for 20 years, that's 1 times 20. So you have a smoking history of 20 pack years.  If you smoked 2 packs a day for 10 years, that's 2 times 10. So you have a smoking history of 20 pack years.  Experts agree that screening is for people who have a high risk of lung cancer. But experts don't agree on what high risk means. Some say people age 50 or older with at least a 20-pack-year smoking history are high risk. Others say it's people age 55 or older with a 30-pack-year history.  To see if you could benefit from screening, first find out if you are at high risk for

## 2025-05-27 NOTE — PROGRESS NOTES
Medicare Annual Wellness Visit    Gisela Richmond is here for Medicare AWV  HPI:  History of Present Illness  The patient presents for a routine checkup.    She reports having undergone a mammogram, which was normal. Additionally, she had a colonoscopy a couple of years ago, which also yielded normal results with no polyps found. She mentions that she has not been participating in annual CT lung screenings despite having a history of smoking for a long time. She has quit smoking and currently reports no related problems.     Recently, she had another bone marrow biopsy and is awaiting the results. She also mentions seeking financial assistance for her medical expenses and is currently trying to get on disability due to her health conditions and inability to work.    SOCIAL HISTORY  The patient quit smoking recently.        Assessment & Plan   Medicare annual wellness visit, subsequent  Personal history of tobacco use  -     FL VISIT TO DISCUSS LUNG CA SCREEN W LDCT  -     CT Lung Screen (Initial/Annual/Baseline); Future  Bilateral impacted cerumen        Return in about 6 months (around 11/27/2025).     Subjective   The following acute and/or chronic problems were also addressed today:      Patient's complete Health Risk Assessment and screening values have been reviewed and are found in Flowsheets. The following problems were reviewed today and where indicated follow up appointments were made and/or referrals ordered.    Positive Risk Factor Screenings with Interventions:       Alcohol Screening:  AUDIT-C Score: 4     Total Score Interpretation: 0-7 suggests low risk alcohol consumption but assess individual risks   Interventions:  Patient declined any further intervention or treatment              Inactivity:  On average, how many days per week do you engage in moderate to strenuous exercise (like a brisk walk)?: 2 days (!) Abnormal  On average, how many minutes do you engage in exercise at this level?: 90 min

## 2025-05-28 LAB — CHROMOSOME, BONE MARROW: NORMAL

## 2025-05-30 LAB
CHROM ANALY PNL FISH: NORMAL
DX PRELIMINARY: NORMAL
EER MDS PANEL FISH: NORMAL
GENE XXX MUT ANL BLD/T: NORMAL
PATHOLOGY STUDY: NORMAL
SPECIMEN SOURCE: NORMAL

## 2025-06-12 ENCOUNTER — OFFICE VISIT (OUTPATIENT)
Dept: ONCOLOGY | Age: 70
End: 2025-06-12

## 2025-06-12 ENCOUNTER — HOSPITAL ENCOUNTER (OUTPATIENT)
Dept: INFUSION THERAPY | Age: 70
Discharge: HOME | End: 2025-06-12
Payer: MEDICARE

## 2025-06-12 VITALS
HEART RATE: 80 BPM | OXYGEN SATURATION: 96 % | SYSTOLIC BLOOD PRESSURE: 137 MMHG | WEIGHT: 109 LBS | BODY MASS INDEX: 19.31 KG/M2 | HEIGHT: 63 IN | DIASTOLIC BLOOD PRESSURE: 65 MMHG | RESPIRATION RATE: 20 BRPM | TEMPERATURE: 98.1 F

## 2025-06-12 VITALS
OXYGEN SATURATION: 96 % | TEMPERATURE: 98.1 F | BODY MASS INDEX: 19.31 KG/M2 | HEIGHT: 63 IN | WEIGHT: 109 LBS | DIASTOLIC BLOOD PRESSURE: 65 MMHG | HEART RATE: 80 BPM | RESPIRATION RATE: 20 BRPM | SYSTOLIC BLOOD PRESSURE: 137 MMHG

## 2025-06-12 DIAGNOSIS — D75.9 CYTOPENIA: ICD-10-CM

## 2025-06-12 DIAGNOSIS — D69.6 THROMBOCYTOPENIA: ICD-10-CM

## 2025-06-12 DIAGNOSIS — D72.819 LEUKOPENIA, UNSPECIFIED TYPE: ICD-10-CM

## 2025-06-12 DIAGNOSIS — D75.9 CLONAL CYTOPENIA OF UNDETERMINED SIGNIFICANCE (CCUS): ICD-10-CM

## 2025-06-12 DIAGNOSIS — D64.9 NORMOCYTIC ANEMIA: ICD-10-CM

## 2025-06-12 DIAGNOSIS — D64.9 NORMOCYTIC ANEMIA: Primary | ICD-10-CM

## 2025-06-12 LAB
BASOPHILS ABSOLUTE: 0 THOU/MM3 (ref 0–0.1)
BASOPHILS NFR BLD AUTO: 1 % (ref 0–3)
BUN BLDP-MCNC: 26 MG/DL (ref 8–26)
CHLORIDE BLD-SCNC: 108 MEQ/L (ref 98–109)
CREAT BLD-MCNC: 1 MG/DL (ref 0.5–1.2)
EOSINOPHIL NFR BLD AUTO: 1 % (ref 0–4)
EOSINOPHILS ABSOLUTE: 0 THOU/MM3 (ref 0–0.4)
ERYTHROCYTE [DISTWIDTH] IN BLOOD BY AUTOMATED COUNT: 11.7 % (ref 11.5–14.5)
FERRITIN SERPL IA-MCNC: 53 NG/ML (ref 13–150)
FOLATE SERPL-MCNC: > 20 NG/ML (ref 4.6–34.8)
GFR SERPL CREATININE-BSD FRML MDRD: 60 ML/MIN/1.73M2
GLUCOSE BLD-MCNC: 92 MG/DL (ref 70–108)
HCT VFR BLD AUTO: 31.7 % (ref 37–47)
HGB BLD-MCNC: 10 GM/DL (ref 12–16)
IMMATURE GRANULOCYTES %: 0 %
IMMATURE GRANULOCYTES ABSOLUTE: 0 THOU/MM3 (ref 0–0.07)
IONIZED CALCIUM, WHOLE BLOOD: 1.23 MMOL/L (ref 1.12–1.32)
IRON SATN MFR SERPL: 13 % (ref 20–50)
IRON SERPL-MCNC: 33 UG/DL (ref 37–145)
LYMPHOCYTES ABSOLUTE: 0.9 THOU/MM3 (ref 1–4.8)
LYMPHOCYTES NFR BLD AUTO: 33 % (ref 15–47)
MCH RBC QN AUTO: 32.5 PG (ref 26–33)
MCHC RBC AUTO-ENTMCNC: 31.5 GM/DL (ref 32.2–35.5)
MCV RBC AUTO: 103 FL (ref 81–99)
MONOCYTES ABSOLUTE: 0.9 THOU/MM3 (ref 0.4–1.3)
MONOCYTES NFR BLD AUTO: 35 % (ref 0–12)
NEUTROPHILS ABSOLUTE: 0.8 THOU/MM3 (ref 1.8–7.7)
NEUTROPHILS NFR BLD AUTO: 31 % (ref 43–75)
PLATELET # BLD AUTO: 87 THOU/MM3 (ref 130–400)
PMV BLD AUTO: 10.8 FL (ref 9.4–12.4)
POTASSIUM BLD-SCNC: 5.4 MEQ/L (ref 3.5–4.9)
RBC # BLD AUTO: 3.08 MILL/MM3 (ref 4.2–5.4)
SODIUM BLD-SCNC: 142 MEQ/L (ref 138–146)
TIBC SERPL-MCNC: 246 UG/DL (ref 171–450)
TOTAL CO2, WHOLE BLOOD: 24 MEQ/L (ref 23–33)
VIT B12 SERPL-MCNC: 825 PG/ML (ref 232–1245)
WBC # BLD AUTO: 2.6 THOU/MM3 (ref 4.8–10.8)

## 2025-06-12 PROCEDURE — 85025 COMPLETE CBC W/AUTO DIFF WBC: CPT

## 2025-06-12 PROCEDURE — 82607 VITAMIN B-12: CPT

## 2025-06-12 PROCEDURE — 81338 MPL GENE COMMON VARIANTS: CPT

## 2025-06-12 PROCEDURE — 80047 BASIC METABLC PNL IONIZED CA: CPT

## 2025-06-12 PROCEDURE — 99211 OFF/OP EST MAY X REQ PHY/QHP: CPT

## 2025-06-12 PROCEDURE — 82746 ASSAY OF FOLIC ACID SERUM: CPT

## 2025-06-12 PROCEDURE — 81270 JAK2 GENE: CPT

## 2025-06-12 PROCEDURE — 82728 ASSAY OF FERRITIN: CPT

## 2025-06-12 PROCEDURE — 83550 IRON BINDING TEST: CPT

## 2025-06-12 PROCEDURE — 81219 CALR GENE COM VARIANTS: CPT

## 2025-06-12 PROCEDURE — 36415 COLL VENOUS BLD VENIPUNCTURE: CPT

## 2025-06-12 PROCEDURE — 83540 ASSAY OF IRON: CPT

## 2025-06-12 NOTE — PROGRESS NOTES
constipation, diarrhea, nausea and vomiting.   Endocrine: Negative for cold intolerance.   Genitourinary:  Negative for dysuria, hematuria and urgency.   Musculoskeletal:  Negative for back pain, myalgias and neck stiffness.   Skin:  Negative for color change and rash.   Neurological:  Negative for dizziness, tremors, seizures, numbness and headaches.   Hematological:  Negative for adenopathy.   Psychiatric/Behavioral:  Negative for behavioral problems, confusion, sleep disturbance and suicidal ideas. The patient is not nervous/anxious.       Pertinent review of systems noted in HPI, all other ROS negative.     Objective:   Physical Exam  Constitutional:       Appearance: Normal appearance. She is not ill-appearing or toxic-appearing.   HENT:      Head: Normocephalic and atraumatic.      Right Ear: External ear normal.      Left Ear: External ear normal.      Nose: Nose normal.      Mouth/Throat:      Mouth: Mucous membranes are moist.      Pharynx: No posterior oropharyngeal erythema.   Eyes:      General: No scleral icterus.     Conjunctiva/sclera: Conjunctivae normal.      Pupils: Pupils are equal, round, and reactive to light.   Cardiovascular:      Rate and Rhythm: Normal rate and regular rhythm.      Pulses: Normal pulses.      Heart sounds: Normal heart sounds.   Pulmonary:      Effort: Pulmonary effort is normal. No respiratory distress.      Breath sounds: Normal breath sounds.   Chest:      Chest wall: No tenderness.   Abdominal:      General: Abdomen is flat. There is no distension.      Palpations: Abdomen is soft. There is no mass.      Tenderness: There is no abdominal tenderness.   Musculoskeletal:         General: No swelling. Normal range of motion.      Right lower leg: No edema.      Left lower leg: No edema.   Skin:     General: Skin is warm.      Findings: No bruising or lesion.   Neurological:      General: No focal deficit present.      Mental Status: She is oriented to person, place, and

## 2025-06-13 LAB — JAK2 QUAL, SOURCE: NORMAL

## 2025-06-19 LAB
JAK2 P.V617F BLD/T QL: NOT DETECTED
SPECIMEN SOURCE: NORMAL

## 2025-06-20 LAB — MPL GENE MUT TESTED BLD/T: NOT DETECTED

## 2025-06-23 LAB — GENE XXX MUT ANL BLD/T: NOT DETECTED

## 2025-06-27 ASSESSMENT — ENCOUNTER SYMPTOMS
SHORTNESS OF BREATH: 0
ABDOMINAL PAIN: 0
COLOR CHANGE: 0
DIARRHEA: 0
SORE THROAT: 0
BACK PAIN: 0
PHOTOPHOBIA: 0
VOMITING: 0
FACIAL SWELLING: 0
CONSTIPATION: 0
CHEST TIGHTNESS: 0
COUGH: 0
NAUSEA: 0
EYE REDNESS: 0

## (undated) DEVICE — SUTURE PROL 7-0 L24IN NONABSORBABLE BLU L9.3MM CC 3/8 CIR M8704

## (undated) DEVICE — SUTURE SOFSILK SZ 2 L30IN NONABSORBABLE WHT V-26 L37MM 1/2 CS746

## (undated) DEVICE — SET PRSS DISPOSABLE LAY L45IN COIL TBNG SIL DIAPH PLAS DOME

## (undated) DEVICE — SUTURE VCRL + SZ 0 L27IN ABSRB VLT L36MM CT-1 1/2 CIR VCPB260H

## (undated) DEVICE — SET AUTOTRNS C175ML BOWL BTM OUTLT RESERVOIRXTRA

## (undated) DEVICE — SUTURE MCRYL SZ 4-0 L27IN ABSRB UD L19MM PS-2 1/2 CIR PRIM Y426H

## (undated) DEVICE — CONNECTOR PERF 3/8X3/8X3/8IN EQL WYE

## (undated) DEVICE — RETRACTOR SURG INSRT SUT HLD OCTOBASE

## (undated) DEVICE — Device

## (undated) DEVICE — SUTURE PROL SZ 3-0 L36IN NONABSORBABLE BLU L26MM SH 1/2 CIR 8522H

## (undated) DEVICE — SUTURE VCRL + SZ 2-0 L27IN ABSRB CLR CT-1 1/2 CIR TAPERCUT VCP259H

## (undated) DEVICE — 3M™ IOBAN™ 2 ANTIMICROBIAL INCISE DRAPE 6651EZ: Brand: IOBAN™ 2

## (undated) DEVICE — GLOVE SURG SZ 7.5 L11.73IN FNGR THK9.8MIL STRW LTX POLYMER

## (undated) DEVICE — PROVE COVER: Brand: UNBRANDED

## (undated) DEVICE — LEAD PACE L475MM CHNL A OR V MYOCARDIAL STEROID ELUT SIL

## (undated) DEVICE — APPLICATOR MEDICATED 26 CC SOLUTION CLR STRL CHLORAPREP

## (undated) DEVICE — SURGICAL PROCEDURE PACK OXGNTR ST MARYS

## (undated) DEVICE — SUTURE PROL 6-0 L24IN NONABSORBABLE BLU L13MM C-1 3/8 CIR M8726

## (undated) DEVICE — SUTURE VCRL + SZ 3-0 L27IN ABSRB WHT CT-1 1/2 CIR VCP258H

## (undated) DEVICE — ADHESIVE SKIN CLSR 0.7ML TOP DERMBND ADV

## (undated) DEVICE — KIT VEN DRNGE VAC ACCSRY PERF VAVD STOCK W/ SPEC TRAP

## (undated) DEVICE — DRAPE SLUSH DISC W44XL66IN ST FOR RND BSIN HUSH SLUSH SYS

## (undated) DEVICE — BASIC SINGLE BASIN BTC-LF: Brand: MEDLINE INDUSTRIES, INC.

## (undated) DEVICE — BLANKET THER AD W24XL60IN FAB COVERING SUP SFT ULT THN LTWT

## (undated) DEVICE — SUTURE PROL SZ 4-0 L36IN NONABSORBABLE BLU L26MM SH 1/2 CIR 8521H

## (undated) DEVICE — OPEN HRT CDS LF

## (undated) DEVICE — DRESSING GRMCDL 6 12FR D1N CNTR HOLE 4MM ANTMCRBL PRTCTVE DI

## (undated) DEVICE — SUTURE SOFSILK SZ 1 L30IN NABSORBABLE BLK C-17 L39MM 3/8 SS646

## (undated) DEVICE — STRIP,CLOSURE,WOUND,MEDI-STRIP,1/2X4: Brand: MEDLINE

## (undated) DEVICE — CANNULA PERF 15FR L12.5IN RG STPCOCK WIREWOUND BODY

## (undated) DEVICE — APPLICATOR MEDICATED 26 CC SOLUTION HI LT ORNG CHLORAPREP

## (undated) DEVICE — DRAIN SURG SGL COLL PT TB FOR ATS BG OASIS

## (undated) DEVICE — SUTURE SILK PERMAHAND PRECUT 6 X 30 IN SZ 1 BLK BRAID A307H

## (undated) DEVICE — SUTURE NONABSORBABLE MONOFILAMENT 4-0 RB-1 36 IN BLU PROLENE 8557H

## (undated) DEVICE — COR-KNOT® QUICK LOAD® SINGLES: Brand: COR-KNOT® QUICK LOAD®

## (undated) DEVICE — COR-KNOT MINI® COMBO KITBASE PACKAGE TYPE - KITEACH STERILE PACKAGE KIT CONTAINS (2) SINGLE PATIENT USE COR-KNOT MINI® DEVICES AND (12) COR-KNOT® QUICK LOADS®.: Brand: COR-KNOT MINI®

## (undated) DEVICE — SUTURE SZ 7 L18IN NONABSORBABLE SIL CCS L48MM 1/2 CIR STRNM M655G

## (undated) DEVICE — GLOVE ORTHO 8   MSG9480